# Patient Record
Sex: FEMALE | Race: WHITE | Employment: PART TIME | URBAN - METROPOLITAN AREA
[De-identification: names, ages, dates, MRNs, and addresses within clinical notes are randomized per-mention and may not be internally consistent; named-entity substitution may affect disease eponyms.]

---

## 2017-02-20 ENCOUNTER — APPOINTMENT (OUTPATIENT)
Dept: GENERAL RADIOLOGY | Age: 62
End: 2017-02-20
Payer: COMMERCIAL

## 2017-02-20 ENCOUNTER — HOSPITAL ENCOUNTER (EMERGENCY)
Age: 62
Discharge: HOME | End: 2017-02-20
Attending: EMERGENCY MEDICINE
Payer: COMMERCIAL

## 2017-02-20 VITALS
OXYGEN SATURATION: 99 % | DIASTOLIC BLOOD PRESSURE: 79 MMHG | HEART RATE: 59 BPM | TEMPERATURE: 97.8 F | BODY MASS INDEX: 29.03 KG/M2 | SYSTOLIC BLOOD PRESSURE: 136 MMHG | RESPIRATION RATE: 16 BRPM | WEIGHT: 185 LBS | HEIGHT: 67 IN

## 2017-02-20 DIAGNOSIS — M79.672 LEFT FOOT PAIN: Primary | ICD-10-CM

## 2017-02-20 PROCEDURE — 73630 X-RAY EXAM OF FOOT: CPT | Performed by: RADIOLOGY

## 2017-02-20 PROCEDURE — 73610 X-RAY EXAM OF ANKLE: CPT

## 2017-02-20 PROCEDURE — 73610 X-RAY EXAM OF ANKLE: CPT | Performed by: RADIOLOGY

## 2017-02-20 PROCEDURE — 99283 EMERGENCY DEPT VISIT LOW MDM: CPT

## 2017-02-20 PROCEDURE — 73630 X-RAY EXAM OF FOOT: CPT

## 2017-02-20 ASSESSMENT — PAIN DESCRIPTION - LOCATION: LOCATION: FOOT

## 2017-02-20 ASSESSMENT — PAIN DESCRIPTION - DESCRIPTORS: DESCRIPTORS: ACHING;SORE

## 2017-02-20 ASSESSMENT — PAIN DESCRIPTION - FREQUENCY: FREQUENCY: CONTINUOUS

## 2017-02-20 ASSESSMENT — PAIN SCALES - GENERAL: PAINLEVEL_OUTOF10: 5

## 2017-02-20 ASSESSMENT — PAIN DESCRIPTION - ORIENTATION: ORIENTATION: LEFT

## 2017-02-20 ASSESSMENT — PAIN DESCRIPTION - PAIN TYPE: TYPE: ACUTE PAIN

## 2017-06-30 ENCOUNTER — APPOINTMENT (OUTPATIENT)
Dept: GENERAL RADIOLOGY | Age: 62
DRG: 201 | End: 2017-06-30
Payer: COMMERCIAL

## 2017-06-30 ENCOUNTER — HOSPITAL ENCOUNTER (INPATIENT)
Age: 62
LOS: 2 days | Discharge: HOME | DRG: 201 | End: 2017-07-02
Attending: EMERGENCY MEDICINE | Admitting: FAMILY MEDICINE
Payer: COMMERCIAL

## 2017-06-30 DIAGNOSIS — J93.9 PNEUMOTHORAX, UNSPECIFIED TYPE: Primary | ICD-10-CM

## 2017-06-30 LAB
ABSOLUTE EOS #: 0.2 K/UL (ref 0–0.4)
ABSOLUTE LYMPH #: 2.2 K/UL (ref 1–4.8)
ABSOLUTE MONO #: 0.7 K/UL (ref 0.1–1.3)
ANION GAP SERPL CALCULATED.3IONS-SCNC: 17 MMOL/L (ref 9–17)
BASOPHILS # BLD: 1 %
BASOPHILS ABSOLUTE: 0.1 K/UL (ref 0–0.2)
BUN BLDV-MCNC: 16 MG/DL (ref 8–23)
BUN/CREAT BLD: NORMAL (ref 9–20)
CALCIUM SERPL-MCNC: 9.8 MG/DL (ref 8.6–10.4)
CHLORIDE BLD-SCNC: 98 MMOL/L (ref 98–107)
CO2: 24 MMOL/L (ref 20–31)
CREAT SERPL-MCNC: 0.75 MG/DL (ref 0.5–0.9)
DIFFERENTIAL TYPE: NORMAL
EOSINOPHILS RELATIVE PERCENT: 2 %
GFR AFRICAN AMERICAN: >60 ML/MIN
GFR NON-AFRICAN AMERICAN: >60 ML/MIN
GFR SERPL CREATININE-BSD FRML MDRD: NORMAL ML/MIN/{1.73_M2}
GFR SERPL CREATININE-BSD FRML MDRD: NORMAL ML/MIN/{1.73_M2}
GLUCOSE BLD-MCNC: 97 MG/DL (ref 70–99)
HCT VFR BLD CALC: 43.7 % (ref 36–46)
HEMOGLOBIN: 14.9 G/DL (ref 12–16)
LYMPHOCYTES # BLD: 27 %
MCH RBC QN AUTO: 31.9 PG (ref 26–34)
MCHC RBC AUTO-ENTMCNC: 34.1 G/DL (ref 31–37)
MCV RBC AUTO: 93.6 FL (ref 80–100)
MONOCYTES # BLD: 8 %
PDW BLD-RTO: 12.9 % (ref 11.5–14.9)
PLATELET # BLD: 264 K/UL (ref 150–450)
PLATELET ESTIMATE: NORMAL
PMV BLD AUTO: 9.1 FL (ref 6–12)
POTASSIUM SERPL-SCNC: 4.6 MMOL/L (ref 3.7–5.3)
RBC # BLD: 4.67 M/UL (ref 4–5.2)
RBC # BLD: NORMAL 10*6/UL
SEG NEUTROPHILS: 62 %
SEGMENTED NEUTROPHILS ABSOLUTE COUNT: 5.2 K/UL (ref 1.3–9.1)
SODIUM BLD-SCNC: 139 MMOL/L (ref 135–144)
TROPONIN INTERP: NORMAL
TROPONIN INTERP: NORMAL
TROPONIN T: <0.03 NG/ML
TROPONIN T: <0.03 NG/ML
WBC # BLD: 8.4 K/UL (ref 3.5–11)
WBC # BLD: NORMAL 10*3/UL

## 2017-06-30 PROCEDURE — 71020 XR CHEST STANDARD TWO VW: CPT

## 2017-06-30 PROCEDURE — 99285 EMERGENCY DEPT VISIT HI MDM: CPT

## 2017-06-30 PROCEDURE — 32551 INSERTION OF CHEST TUBE: CPT

## 2017-06-30 PROCEDURE — 0W9B30Z DRAINAGE OF LEFT PLEURAL CAVITY WITH DRAINAGE DEVICE, PERCUTANEOUS APPROACH: ICD-10-PCS | Performed by: EMERGENCY MEDICINE

## 2017-06-30 PROCEDURE — 6360000002 HC RX W HCPCS

## 2017-06-30 PROCEDURE — 85025 COMPLETE CBC W/AUTO DIFF WBC: CPT

## 2017-06-30 PROCEDURE — 2060000000 HC ICU INTERMEDIATE R&B

## 2017-06-30 PROCEDURE — 93005 ELECTROCARDIOGRAM TRACING: CPT

## 2017-06-30 PROCEDURE — 6360000002 HC RX W HCPCS: Performed by: EMERGENCY MEDICINE

## 2017-06-30 PROCEDURE — 36415 COLL VENOUS BLD VENIPUNCTURE: CPT

## 2017-06-30 PROCEDURE — 96375 TX/PRO/DX INJ NEW DRUG ADDON: CPT

## 2017-06-30 PROCEDURE — 71010 XR CHEST PORTABLE: CPT

## 2017-06-30 PROCEDURE — 2580000003 HC RX 258: Performed by: EMERGENCY MEDICINE

## 2017-06-30 PROCEDURE — 80048 BASIC METABOLIC PNL TOTAL CA: CPT

## 2017-06-30 PROCEDURE — 96374 THER/PROPH/DIAG INJ IV PUSH: CPT

## 2017-06-30 PROCEDURE — 84484 ASSAY OF TROPONIN QUANT: CPT

## 2017-06-30 RX ORDER — ACETAMINOPHEN 325 MG/1
650 TABLET ORAL EVERY 4 HOURS PRN
Status: DISCONTINUED | OUTPATIENT
Start: 2017-06-30 | End: 2017-07-02 | Stop reason: HOSPADM

## 2017-06-30 RX ORDER — LORAZEPAM 2 MG/ML
1 INJECTION INTRAMUSCULAR ONCE
Status: COMPLETED | OUTPATIENT
Start: 2017-06-30 | End: 2017-06-30

## 2017-06-30 RX ORDER — HYDROCHLOROTHIAZIDE 12.5 MG/1
12.5 CAPSULE, GELATIN COATED ORAL DAILY
Status: DISCONTINUED | OUTPATIENT
Start: 2017-07-01 | End: 2017-07-02 | Stop reason: HOSPADM

## 2017-06-30 RX ORDER — HYDROCODONE BITARTRATE AND ACETAMINOPHEN 5; 325 MG/1; MG/1
2 TABLET ORAL EVERY 6 HOURS PRN
Status: DISCONTINUED | OUTPATIENT
Start: 2017-06-30 | End: 2017-07-01

## 2017-06-30 RX ORDER — OMEPRAZOLE 20 MG/1
20 CAPSULE, DELAYED RELEASE ORAL DAILY
Status: DISCONTINUED | OUTPATIENT
Start: 2017-07-01 | End: 2017-07-02 | Stop reason: HOSPADM

## 2017-06-30 RX ORDER — MORPHINE SULFATE 4 MG/ML
4 INJECTION, SOLUTION INTRAMUSCULAR; INTRAVENOUS ONCE
Status: COMPLETED | OUTPATIENT
Start: 2017-06-30 | End: 2017-06-30

## 2017-06-30 RX ORDER — SODIUM CHLORIDE 0.9 % (FLUSH) 0.9 %
10 SYRINGE (ML) INJECTION EVERY 12 HOURS SCHEDULED
Status: DISCONTINUED | OUTPATIENT
Start: 2017-06-30 | End: 2017-07-02 | Stop reason: HOSPADM

## 2017-06-30 RX ORDER — FAMOTIDINE 20 MG/1
20 TABLET, FILM COATED ORAL NIGHTLY PRN
COMMUNITY
End: 2017-09-27 | Stop reason: ALTCHOICE

## 2017-06-30 RX ORDER — ATENOLOL 25 MG/1
25 TABLET ORAL 2 TIMES DAILY
Status: DISCONTINUED | OUTPATIENT
Start: 2017-06-30 | End: 2017-07-02 | Stop reason: HOSPADM

## 2017-06-30 RX ORDER — SODIUM CHLORIDE 0.9 % (FLUSH) 0.9 %
10 SYRINGE (ML) INJECTION PRN
Status: DISCONTINUED | OUTPATIENT
Start: 2017-06-30 | End: 2017-07-02 | Stop reason: HOSPADM

## 2017-06-30 RX ORDER — HYDROCODONE BITARTRATE AND ACETAMINOPHEN 5; 325 MG/1; MG/1
1 TABLET ORAL EVERY 6 HOURS PRN
Status: DISCONTINUED | OUTPATIENT
Start: 2017-06-30 | End: 2017-07-01

## 2017-06-30 RX ORDER — LISINOPRIL 20 MG/1
20 TABLET ORAL DAILY
Status: DISCONTINUED | OUTPATIENT
Start: 2017-07-01 | End: 2017-07-02 | Stop reason: HOSPADM

## 2017-06-30 RX ORDER — FAMOTIDINE 20 MG/1
20 TABLET, FILM COATED ORAL NIGHTLY PRN
Status: DISCONTINUED | OUTPATIENT
Start: 2017-07-01 | End: 2017-07-02 | Stop reason: HOSPADM

## 2017-06-30 RX ORDER — LORAZEPAM 0.5 MG/1
0.5 TABLET ORAL EVERY 12 HOURS PRN
Status: DISCONTINUED | OUTPATIENT
Start: 2017-06-30 | End: 2017-07-02 | Stop reason: HOSPADM

## 2017-06-30 RX ORDER — LEVALBUTEROL TARTRATE 45 UG/1
2 AEROSOL, METERED ORAL EVERY 4 HOURS PRN
Status: DISCONTINUED | OUTPATIENT
Start: 2017-06-30 | End: 2017-07-01 | Stop reason: RX

## 2017-06-30 RX ORDER — LISINOPRIL AND HYDROCHLOROTHIAZIDE 20; 12.5 MG/1; MG/1
1 TABLET ORAL DAILY
Status: DISCONTINUED | OUTPATIENT
Start: 2017-07-01 | End: 2017-06-30 | Stop reason: CLARIF

## 2017-06-30 RX ADMIN — CEFAZOLIN SODIUM 1 G: 1 INJECTION, POWDER, FOR SOLUTION INTRAMUSCULAR; INTRAVENOUS at 19:33

## 2017-06-30 RX ADMIN — HYDROMORPHONE HYDROCHLORIDE 1 MG: 1 INJECTION, SOLUTION INTRAMUSCULAR; INTRAVENOUS; SUBCUTANEOUS at 19:04

## 2017-06-30 RX ADMIN — MORPHINE SULFATE 4 MG: 4 INJECTION, SOLUTION INTRAMUSCULAR; INTRAVENOUS at 18:34

## 2017-06-30 RX ADMIN — LORAZEPAM 1 MG: 2 INJECTION INTRAMUSCULAR; INTRAVENOUS at 18:34

## 2017-06-30 RX ADMIN — Medication 1 MG: at 19:04

## 2017-06-30 RX ADMIN — MORPHINE SULFATE 4 MG: 4 INJECTION, SOLUTION INTRAMUSCULAR; INTRAVENOUS at 21:06

## 2017-06-30 ASSESSMENT — ENCOUNTER SYMPTOMS
BACK PAIN: 1
SHORTNESS OF BREATH: 1
WHEEZING: 0
STRIDOR: 0
COUGH: 0
VOMITING: 0
ABDOMINAL PAIN: 0
NAUSEA: 0

## 2017-06-30 ASSESSMENT — PAIN DESCRIPTION - FREQUENCY: FREQUENCY: CONTINUOUS

## 2017-06-30 ASSESSMENT — PAIN SCALES - GENERAL
PAINLEVEL_OUTOF10: 4
PAINLEVEL_OUTOF10: 10
PAINLEVEL_OUTOF10: 6

## 2017-06-30 ASSESSMENT — PAIN DESCRIPTION - LOCATION
LOCATION: CHEST
LOCATION: CHEST

## 2017-06-30 ASSESSMENT — PAIN DESCRIPTION - ORIENTATION: ORIENTATION: LEFT

## 2017-06-30 ASSESSMENT — PAIN SCALES - WONG BAKER: WONGBAKER_NUMERICALRESPONSE: 6

## 2017-06-30 ASSESSMENT — PAIN DESCRIPTION - DESCRIPTORS: DESCRIPTORS: ACHING

## 2017-06-30 ASSESSMENT — PAIN DESCRIPTION - PAIN TYPE: TYPE: ACUTE PAIN

## 2017-07-01 ENCOUNTER — APPOINTMENT (OUTPATIENT)
Dept: GENERAL RADIOLOGY | Age: 62
DRG: 201 | End: 2017-07-01
Payer: COMMERCIAL

## 2017-07-01 LAB
ABSOLUTE EOS #: 0.2 K/UL (ref 0–0.4)
ABSOLUTE LYMPH #: 2.8 K/UL (ref 1–4.8)
ABSOLUTE MONO #: 0.9 K/UL (ref 0.1–1.3)
ANION GAP SERPL CALCULATED.3IONS-SCNC: 15 MMOL/L (ref 9–17)
BASOPHILS # BLD: 1 %
BASOPHILS ABSOLUTE: 0.1 K/UL (ref 0–0.2)
BUN BLDV-MCNC: 15 MG/DL (ref 8–23)
BUN/CREAT BLD: ABNORMAL (ref 9–20)
CALCIUM SERPL-MCNC: 9.4 MG/DL (ref 8.6–10.4)
CHLORIDE BLD-SCNC: 97 MMOL/L (ref 98–107)
CO2: 26 MMOL/L (ref 20–31)
CREAT SERPL-MCNC: 0.75 MG/DL (ref 0.5–0.9)
DIFFERENTIAL TYPE: NORMAL
EOSINOPHILS RELATIVE PERCENT: 2 %
GFR AFRICAN AMERICAN: >60 ML/MIN
GFR NON-AFRICAN AMERICAN: >60 ML/MIN
GFR SERPL CREATININE-BSD FRML MDRD: ABNORMAL ML/MIN/{1.73_M2}
GFR SERPL CREATININE-BSD FRML MDRD: ABNORMAL ML/MIN/{1.73_M2}
GLUCOSE BLD-MCNC: 113 MG/DL (ref 70–99)
HCT VFR BLD CALC: 43.3 % (ref 36–46)
HEMOGLOBIN: 14.8 G/DL (ref 12–16)
LYMPHOCYTES # BLD: 28 %
MCH RBC QN AUTO: 32.2 PG (ref 26–34)
MCHC RBC AUTO-ENTMCNC: 34.2 G/DL (ref 31–37)
MCV RBC AUTO: 94.1 FL (ref 80–100)
MONOCYTES # BLD: 9 %
PDW BLD-RTO: 13 % (ref 11.5–14.9)
PLATELET # BLD: 229 K/UL (ref 150–450)
PLATELET ESTIMATE: NORMAL
PMV BLD AUTO: 9.1 FL (ref 6–12)
POTASSIUM SERPL-SCNC: 4 MMOL/L (ref 3.7–5.3)
RBC # BLD: 4.6 M/UL (ref 4–5.2)
RBC # BLD: NORMAL 10*6/UL
SEG NEUTROPHILS: 60 %
SEGMENTED NEUTROPHILS ABSOLUTE COUNT: 6.2 K/UL (ref 1.3–9.1)
SODIUM BLD-SCNC: 138 MMOL/L (ref 135–144)
WBC # BLD: 10.2 K/UL (ref 3.5–11)
WBC # BLD: NORMAL 10*3/UL

## 2017-07-01 PROCEDURE — 6370000000 HC RX 637 (ALT 250 FOR IP): Performed by: SURGERY

## 2017-07-01 PROCEDURE — 6370000000 HC RX 637 (ALT 250 FOR IP): Performed by: FAMILY MEDICINE

## 2017-07-01 PROCEDURE — 71010 XR CHEST PORTABLE: CPT

## 2017-07-01 PROCEDURE — 2580000003 HC RX 258: Performed by: FAMILY MEDICINE

## 2017-07-01 PROCEDURE — 6360000002 HC RX W HCPCS: Performed by: FAMILY MEDICINE

## 2017-07-01 PROCEDURE — 36415 COLL VENOUS BLD VENIPUNCTURE: CPT

## 2017-07-01 PROCEDURE — 80048 BASIC METABOLIC PNL TOTAL CA: CPT

## 2017-07-01 PROCEDURE — 85025 COMPLETE CBC W/AUTO DIFF WBC: CPT

## 2017-07-01 PROCEDURE — 2060000000 HC ICU INTERMEDIATE R&B

## 2017-07-01 RX ORDER — HYDROCODONE BITARTRATE AND ACETAMINOPHEN 5; 325 MG/1; MG/1
1 TABLET ORAL EVERY 4 HOURS PRN
Status: DISCONTINUED | OUTPATIENT
Start: 2017-07-01 | End: 2017-07-02 | Stop reason: HOSPADM

## 2017-07-01 RX ADMIN — HYDROCHLOROTHIAZIDE 12.5 MG: 12.5 CAPSULE ORAL at 08:42

## 2017-07-01 RX ADMIN — CEFAZOLIN 1 G: 1 INJECTION, POWDER, FOR SOLUTION INTRAMUSCULAR; INTRAVENOUS at 19:49

## 2017-07-01 RX ADMIN — HYDROCODONE BITARTRATE AND ACETAMINOPHEN 1 TABLET: 5; 325 TABLET ORAL at 08:41

## 2017-07-01 RX ADMIN — HYDROCODONE BITARTRATE AND ACETAMINOPHEN 1 TABLET: 5; 325 TABLET ORAL at 00:45

## 2017-07-01 RX ADMIN — OMEPRAZOLE 20 MG: 20 CAPSULE, DELAYED RELEASE ORAL at 08:40

## 2017-07-01 RX ADMIN — HYDROCODONE BITARTRATE AND ACETAMINOPHEN 1 TABLET: 5; 325 TABLET ORAL at 14:13

## 2017-07-01 RX ADMIN — LISINOPRIL 20 MG: 20 TABLET ORAL at 08:41

## 2017-07-01 RX ADMIN — Medication 10 ML: at 20:23

## 2017-07-01 RX ADMIN — ATENOLOL 25 MG: 25 TABLET ORAL at 08:40

## 2017-07-01 RX ADMIN — HYDROCODONE BITARTRATE AND ACETAMINOPHEN 1 TABLET: 5; 325 TABLET ORAL at 19:56

## 2017-07-01 RX ADMIN — CEFAZOLIN 1 G: 1 INJECTION, POWDER, FOR SOLUTION INTRAMUSCULAR; INTRAVENOUS at 08:43

## 2017-07-01 RX ADMIN — HYDROCODONE BITARTRATE AND ACETAMINOPHEN 1 TABLET: 5; 325 TABLET ORAL at 04:19

## 2017-07-01 ASSESSMENT — PAIN SCALES - GENERAL
PAINLEVEL_OUTOF10: 2
PAINLEVEL_OUTOF10: 5
PAINLEVEL_OUTOF10: 8
PAINLEVEL_OUTOF10: 9
PAINLEVEL_OUTOF10: 7
PAINLEVEL_OUTOF10: 0
PAINLEVEL_OUTOF10: 6
PAINLEVEL_OUTOF10: 3
PAINLEVEL_OUTOF10: 0
PAINLEVEL_OUTOF10: 1

## 2017-07-01 ASSESSMENT — PAIN DESCRIPTION - ORIENTATION
ORIENTATION: LEFT
ORIENTATION: LEFT
ORIENTATION: LEFT;OUTER
ORIENTATION: LEFT

## 2017-07-01 ASSESSMENT — PAIN DESCRIPTION - FREQUENCY: FREQUENCY: CONTINUOUS

## 2017-07-01 ASSESSMENT — PAIN DESCRIPTION - PAIN TYPE
TYPE: SURGICAL PAIN
TYPE: ACUTE PAIN

## 2017-07-01 ASSESSMENT — PAIN DESCRIPTION - LOCATION
LOCATION: CHEST

## 2017-07-01 ASSESSMENT — PAIN DESCRIPTION - DESCRIPTORS: DESCRIPTORS: ACHING;DISCOMFORT

## 2017-07-02 ENCOUNTER — APPOINTMENT (OUTPATIENT)
Dept: GENERAL RADIOLOGY | Age: 62
DRG: 201 | End: 2017-07-02
Payer: COMMERCIAL

## 2017-07-02 VITALS
TEMPERATURE: 98.2 F | HEIGHT: 67 IN | DIASTOLIC BLOOD PRESSURE: 65 MMHG | WEIGHT: 193.78 LBS | OXYGEN SATURATION: 94 % | RESPIRATION RATE: 17 BRPM | HEART RATE: 64 BPM | BODY MASS INDEX: 30.42 KG/M2 | SYSTOLIC BLOOD PRESSURE: 94 MMHG

## 2017-07-02 PROCEDURE — 6370000000 HC RX 637 (ALT 250 FOR IP): Performed by: FAMILY MEDICINE

## 2017-07-02 PROCEDURE — 2580000003 HC RX 258: Performed by: FAMILY MEDICINE

## 2017-07-02 PROCEDURE — 71010 XR CHEST PORTABLE: CPT

## 2017-07-02 PROCEDURE — 6370000000 HC RX 637 (ALT 250 FOR IP): Performed by: SURGERY

## 2017-07-02 PROCEDURE — 71010 XR CHEST LIMITED: CPT

## 2017-07-02 RX ORDER — HYDROCODONE BITARTRATE AND ACETAMINOPHEN 5; 325 MG/1; MG/1
1 TABLET ORAL EVERY 8 HOURS PRN
Qty: 60 TABLET | Refills: 0 | Status: SHIPPED | OUTPATIENT
Start: 2017-07-02 | End: 2017-08-01

## 2017-07-02 RX ADMIN — HYDROCODONE BITARTRATE AND ACETAMINOPHEN 1 TABLET: 5; 325 TABLET ORAL at 05:05

## 2017-07-02 RX ADMIN — HYDROCHLOROTHIAZIDE 12.5 MG: 12.5 CAPSULE ORAL at 08:24

## 2017-07-02 RX ADMIN — Medication 10 ML: at 08:24

## 2017-07-02 RX ADMIN — HYDROCODONE BITARTRATE AND ACETAMINOPHEN 1 TABLET: 5; 325 TABLET ORAL at 00:30

## 2017-07-02 RX ADMIN — OMEPRAZOLE 20 MG: 20 CAPSULE, DELAYED RELEASE ORAL at 08:23

## 2017-07-02 RX ADMIN — HYDROCODONE BITARTRATE AND ACETAMINOPHEN 1 TABLET: 5; 325 TABLET ORAL at 09:08

## 2017-07-02 RX ADMIN — HYDROCODONE BITARTRATE AND ACETAMINOPHEN 1 TABLET: 5; 325 TABLET ORAL at 13:08

## 2017-07-02 RX ADMIN — ATENOLOL 25 MG: 25 TABLET ORAL at 08:23

## 2017-07-02 ASSESSMENT — PAIN DESCRIPTION - PAIN TYPE
TYPE: SURGICAL PAIN

## 2017-07-02 ASSESSMENT — PAIN DESCRIPTION - ORIENTATION
ORIENTATION: LEFT
ORIENTATION: LEFT
ORIENTATION: LEFT;OUTER
ORIENTATION: LEFT

## 2017-07-02 ASSESSMENT — PAIN DESCRIPTION - LOCATION
LOCATION: CHEST

## 2017-07-02 ASSESSMENT — PAIN SCALES - GENERAL
PAINLEVEL_OUTOF10: 2
PAINLEVEL_OUTOF10: 4
PAINLEVEL_OUTOF10: 6
PAINLEVEL_OUTOF10: 1
PAINLEVEL_OUTOF10: 5
PAINLEVEL_OUTOF10: 5
PAINLEVEL_OUTOF10: 6
PAINLEVEL_OUTOF10: 7
PAINLEVEL_OUTOF10: 3
PAINLEVEL_OUTOF10: 6

## 2017-07-02 ASSESSMENT — PAIN DESCRIPTION - FREQUENCY
FREQUENCY: CONTINUOUS
FREQUENCY: CONTINUOUS

## 2017-07-02 ASSESSMENT — PAIN DESCRIPTION - DESCRIPTORS
DESCRIPTORS: ACHING;DISCOMFORT
DESCRIPTORS: ACHING;DISCOMFORT

## 2017-07-05 LAB
EKG ATRIAL RATE: 94 BPM
EKG P AXIS: 71 DEGREES
EKG P-R INTERVAL: 144 MS
EKG Q-T INTERVAL: 368 MS
EKG QRS DURATION: 80 MS
EKG QTC CALCULATION (BAZETT): 460 MS
EKG R AXIS: 66 DEGREES
EKG T AXIS: 85 DEGREES
EKG VENTRICULAR RATE: 94 BPM

## 2017-07-28 ENCOUNTER — HOSPITAL ENCOUNTER (OUTPATIENT)
Dept: GENERAL RADIOLOGY | Age: 62
Discharge: HOME | End: 2017-07-28
Payer: COMMERCIAL

## 2017-07-28 ENCOUNTER — HOSPITAL ENCOUNTER (OUTPATIENT)
Age: 62
Discharge: HOME | End: 2017-07-28
Payer: COMMERCIAL

## 2017-07-28 DIAGNOSIS — J93.9 PNEUMOTHORAX, UNSPECIFIED TYPE: ICD-10-CM

## 2017-07-28 PROCEDURE — 71020 XR CHEST STANDARD TWO VW: CPT

## 2017-09-27 ENCOUNTER — OFFICE VISIT (OUTPATIENT)
Dept: GASTROENTEROLOGY | Age: 62
End: 2017-09-27
Payer: COMMERCIAL

## 2017-09-27 VITALS
SYSTOLIC BLOOD PRESSURE: 129 MMHG | TEMPERATURE: 96.9 F | OXYGEN SATURATION: 95 % | HEIGHT: 67 IN | HEART RATE: 69 BPM | DIASTOLIC BLOOD PRESSURE: 75 MMHG | BODY MASS INDEX: 31.45 KG/M2 | WEIGHT: 200.4 LBS

## 2017-09-27 DIAGNOSIS — K62.5 RECTAL BLEEDING: Primary | ICD-10-CM

## 2017-09-27 DIAGNOSIS — R19.7 DIARRHEA, UNSPECIFIED TYPE: ICD-10-CM

## 2017-09-27 DIAGNOSIS — Z86.010 HISTORY OF COLON POLYPS: ICD-10-CM

## 2017-09-27 PROCEDURE — 99214 OFFICE O/P EST MOD 30 MIN: CPT | Performed by: INTERNAL MEDICINE

## 2017-09-27 RX ORDER — IBUPROFEN 200 MG
200 TABLET ORAL EVERY 6 HOURS PRN
COMMUNITY
End: 2020-04-29

## 2017-09-27 ASSESSMENT — ENCOUNTER SYMPTOMS
RHINORRHEA: 0
ANAL BLEEDING: 0
VOICE CHANGE: 0
SINUS PRESSURE: 1
NAUSEA: 0
BACK PAIN: 1
BLOOD IN STOOL: 1
FACIAL SWELLING: 0
RECTAL PAIN: 0
WHEEZING: 0
SORE THROAT: 0
SHORTNESS OF BREATH: 1
ABDOMINAL DISTENTION: 0
VOMITING: 0
CONSTIPATION: 0
COUGH: 0
ABDOMINAL PAIN: 0
DIARRHEA: 1
TROUBLE SWALLOWING: 0

## 2017-11-18 ENCOUNTER — HOSPITAL ENCOUNTER (OUTPATIENT)
Age: 62
Setting detail: OUTPATIENT SURGERY
Discharge: HOME | End: 2017-11-18
Attending: INTERNAL MEDICINE | Admitting: INTERNAL MEDICINE
Payer: COMMERCIAL

## 2017-11-18 VITALS
BODY MASS INDEX: 29.82 KG/M2 | DIASTOLIC BLOOD PRESSURE: 73 MMHG | HEIGHT: 67 IN | HEART RATE: 60 BPM | RESPIRATION RATE: 18 BRPM | TEMPERATURE: 97.7 F | OXYGEN SATURATION: 94 % | WEIGHT: 190 LBS | SYSTOLIC BLOOD PRESSURE: 117 MMHG

## 2017-11-18 PROCEDURE — 6360000002 HC RX W HCPCS: Performed by: INTERNAL MEDICINE

## 2017-11-18 PROCEDURE — 99152 MOD SED SAME PHYS/QHP 5/>YRS: CPT | Performed by: INTERNAL MEDICINE

## 2017-11-18 PROCEDURE — C1773 RET DEV, INSERTABLE: HCPCS | Performed by: INTERNAL MEDICINE

## 2017-11-18 PROCEDURE — 88305 TISSUE EXAM BY PATHOLOGIST: CPT

## 2017-11-18 PROCEDURE — 7100000010 HC PHASE II RECOVERY - FIRST 15 MIN: Performed by: INTERNAL MEDICINE

## 2017-11-18 PROCEDURE — 2580000003 HC RX 258: Performed by: INTERNAL MEDICINE

## 2017-11-18 PROCEDURE — 3609010400 HC COLONOSCOPY POLYPECTOMY HOT BIOPSY: Performed by: INTERNAL MEDICINE

## 2017-11-18 PROCEDURE — 7100000011 HC PHASE II RECOVERY - ADDTL 15 MIN: Performed by: INTERNAL MEDICINE

## 2017-11-18 PROCEDURE — 99153 MOD SED SAME PHYS/QHP EA: CPT | Performed by: INTERNAL MEDICINE

## 2017-11-18 RX ORDER — MIDAZOLAM HYDROCHLORIDE 1 MG/ML
INJECTION INTRAMUSCULAR; INTRAVENOUS PRN
Status: DISCONTINUED | OUTPATIENT
Start: 2017-11-18 | End: 2017-11-18 | Stop reason: HOSPADM

## 2017-11-18 RX ORDER — FENTANYL CITRATE 50 UG/ML
INJECTION, SOLUTION INTRAMUSCULAR; INTRAVENOUS PRN
Status: DISCONTINUED | OUTPATIENT
Start: 2017-11-18 | End: 2017-11-18 | Stop reason: HOSPADM

## 2017-11-18 RX ORDER — SODIUM CHLORIDE 9 MG/ML
INJECTION, SOLUTION INTRAVENOUS CONTINUOUS
Status: DISCONTINUED | OUTPATIENT
Start: 2017-11-18 | End: 2017-11-18 | Stop reason: HOSPADM

## 2017-11-18 RX ADMIN — SODIUM CHLORIDE: 9 INJECTION, SOLUTION INTRAVENOUS at 07:05

## 2017-11-18 ASSESSMENT — PAIN DESCRIPTION - DESCRIPTORS: DESCRIPTORS: CRAMPING

## 2017-11-18 ASSESSMENT — PAIN - FUNCTIONAL ASSESSMENT: PAIN_FUNCTIONAL_ASSESSMENT: 0-10

## 2017-11-18 ASSESSMENT — PAIN DESCRIPTION - PAIN TYPE: TYPE: ACUTE PAIN

## 2017-11-18 ASSESSMENT — PAIN SCALES - GENERAL: PAINLEVEL_OUTOF10: 2

## 2017-11-18 ASSESSMENT — PAIN DESCRIPTION - LOCATION: LOCATION: ABDOMEN

## 2017-11-18 NOTE — H&P
HISTORY and [address]       NAME:  Beatrice Lopez  MRN: [id number]   YOB: 1955   Date: 11/18/2017   Age: 59 y.o. Gender: female       COMPLAINT AND PRESENT HISTORY:   59 y o female with last colonoscopy about one year ago. Patient had \"flat polyp\" removed. Has IBS with lose BM 5-6 times/day. Tends to have rectal bleeding. Has had hemorrhoids in the past.  No bleeding following MD visit with rectal exam.    Bleeding is bright red and turns commode water red. Patient experiences nausea and cramping. PAST MEDICAL HISTORY     Past Medical History:   Diagnosis Date    Arthritis     COPD [facility]chronic obstructive pulmonary disease) ([facility] [address]     GERD (gastroesophageal reflux disease)     Heart palpitations     History of colon polyps 2002    Hyperlipidemia     Hyperplastic colon polyp 2016    Hypertension     Pneumothorax     spontaneous    Supraventricular tachycardia (HCC)        Pt denies any history of Diabetes mellitus type 2, hypertension, stroke, heart disease,  Asthma, Cancer, Seizures,Thyroid disease, Kidney Disease, Hepatitis, TB, Psychiatric Disorders or Substance abuse.     SURGICAL HISTORY       Past Surgical History:   Procedure Laterality Date    CHOLECYSTECTOMY, LAPAROSCOPIC  2010    COLONOSCOPY  9/2009    small hemorrhoids    COLONOSCOPY  7/2002    hemohhoids, hyperplastic polyp, right colon pathology-chronic inflammation in lumina propria with focal actue cryptitis consistent with active colitis    COLONOSCOPY  02/17/2016    flat sigmoid [id number]    DILATION AND CURETTAGE      AUB    GYNECOLOGIC CRYOSURGERY  30's    SIGMOIDOSCOPY  2003    hemorrhoids, no colitis seen    TONSILLECTOMY AND ADENOIDECTOMY      child        FAMILY HISTORY       Family History   Problem Relation Age of Onset    Cancer Father 28     bone sarcoma    Hypertension Mother    [facility] Other Mother      peripheral neuropathy    Uterine Cancer Maternal (PRINZIDE;ZESTORETIC) 20-12.5 MG per tablet Take 1 tablet by mouth daily 90 tablet 1    atenolol (TENORMIN) 25 MG tablet Take 1 tablet by mouth 2 times daily 180 tablet 1    LORazepam (ATIVAN) 0.5 MG tablet Take 1 tablet by mouth 2 times daily (Patient taking differently: Take 0.5 mg by mouth every 12 hours as needed for Anxiety ) 60 tablet 0    omeprazole (PRILOSEC) 20 MG capsule Take 20 mg by mouth daily.  ibuprofen (ADVIL) 200 MG tablet Take 200 mg by mouth every 6 hours as needed for Pain      aspirin 81 MG tablet Take 81 mg by mouth daily as needed           General health:  Fairly good. No fever or chills. Skin:  No itching, redness or rash. HEENT:  No headache, epistaxis or sore throat. Neck:  No pain, stiffness or masses. Cardiovascular/Respiratory system:  No chest pain, palpitation or shortness of breath. Hx of spontaneous pneumothorax. Last one month ago. Gastrointestinal tract: No abdominal pain, nausea, vomiting, diarrhea or constipation. IBS. Genitourinary:  No burning on micturition. No hesitancy, urgency, frequency or discoloration of urine. Locomotor:  No bone or joint pains. No swelling. Arthritis, back issues. Neuropsychiatric:  No referable complaints. See HPI. GENERAL PHYSICAL EXAM:     Vitals: /77   Pulse 55   Temp 97.3 °F (36.3 °C) (Oral)   Resp 18   Ht 5' 7\" (1.702 m)   Wt 190 lb (86.2 kg)   LMP 06/26/1997   SpO2 98%   BMI 29.76 kg/m²  Body mass index is 29.76 kg/m². GENERAL APPEARANCE:   Joe Solano is 59 y.o.,  female, not obese, nourished, conscious, alert. Does not appear to be distress or pain at this time. SKIN:  Warm, dry, no cyanosis or jaundice. HEAD:  Normocephalic, atraumatic, no swelling or tenderness.                  EYES:  Pupils equal, reactive to light.           EARS:  No discharge, no marked hearing loss. NOSE:  No rhinorrhea, epistaxis or septal deformity. THROAT:  Not congested. No ulceration bleeding or discharge. NECK:  No stiffness, trachea central.  No palpable masses or L.N.                 CHEST:  Symmetrical and equal on expansion. HEART:  RRR S1 > S2. No audible murmurs or gallops. LUNGS:  Equal on expansion, normal breath sounds. No adventitious sounds. ABDOMEN:    Soft on palpation. No localized tenderness. No guarding or rigidity. No palpable organomegaly. LYMPHATICS:  No palpable cervical lymphadenopathy. LOCOMOTOR, BACK AND SPINE:  No tenderness or deformities. EXTREMITIES:  Symmetrical, no pedal edema. Katinas sign negative. No discoloration or ulcerations. NEUROLOGIC:  The patient is conscious, alert, oriented, No apparent focal sensory or motor deficits.                                                                                      PROVISIONAL DIAGNOSES / SURGERY:      Colonoscopy    Patient Active Problem List    Diagnosis Date Noted    Diarrhea 09/27/2017    Hyperplastic colon polyp     Change in bowel function 02/10/2016    Rectal bleeding 02/10/2016    History of colon polyps     COPD (chronic obstructive pulmonary disease) ([facility] [address]     Hyperlipidemia     Heart palpitations            YVETTE BARKER NP on 11/18/2017 at 7:31 AM

## 2017-11-18 NOTE — OP NOTE
SNARE AND CAUTERY TECHNIQUE. aS THERE WAS SOME RESIDUAL TISSUE AT THE MARGINS OF THIS POLYPECTOMY SITE, I DID DESTROYED THIS RESIDUAL POLYP APC. Rectum/Anus: examined in normal and retroflexed positions and was normal    Withdrawal Time was (minutes): 20          The colon was decompressed and the scope was removed. The patient tolerated the procedure well without complications. Recommendations/Plan:   1. F/U Biopsies  2. F/U In Office as instructed  3. Discussed with the family  4. High fiber diet   5. Precautions to avoid constipation     Next colonoscopy: 1 years.   If Colonoscopy is less than 10 years the recommended reason is due:polyps    Electronically signed by Chad Torrez MD  on 11/18/2017 at 12:46 PM

## 2017-11-21 LAB — SURGICAL PATHOLOGY REPORT: NORMAL

## 2017-11-24 ENCOUNTER — HOSPITAL ENCOUNTER (OUTPATIENT)
Dept: MRI IMAGING | Age: 62
Discharge: HOME | End: 2017-11-24
Payer: COMMERCIAL

## 2017-11-24 DIAGNOSIS — G89.4 CHRONIC PAIN SYNDROME: ICD-10-CM

## 2017-11-24 PROCEDURE — 72141 MRI NECK SPINE W/O DYE: CPT

## 2017-12-19 PROBLEM — K63.5 COLON POLYPS: Status: ACTIVE | Noted: 2017-11-18

## 2018-08-31 ENCOUNTER — HOSPITAL ENCOUNTER (EMERGENCY)
Age: 63
Discharge: HOME | End: 2018-08-31
Attending: EMERGENCY MEDICINE
Payer: COMMERCIAL

## 2018-08-31 ENCOUNTER — APPOINTMENT (OUTPATIENT)
Dept: GENERAL RADIOLOGY | Age: 63
End: 2018-08-31
Payer: COMMERCIAL

## 2018-08-31 ENCOUNTER — APPOINTMENT (OUTPATIENT)
Dept: CT IMAGING | Age: 63
End: 2018-08-31
Payer: COMMERCIAL

## 2018-08-31 VITALS
HEIGHT: 67 IN | BODY MASS INDEX: 30.61 KG/M2 | SYSTOLIC BLOOD PRESSURE: 124 MMHG | OXYGEN SATURATION: 96 % | RESPIRATION RATE: 14 BRPM | WEIGHT: 195 LBS | TEMPERATURE: 97.9 F | DIASTOLIC BLOOD PRESSURE: 82 MMHG | HEART RATE: 58 BPM

## 2018-08-31 DIAGNOSIS — R07.9 CHEST PAIN, UNSPECIFIED TYPE: Primary | ICD-10-CM

## 2018-08-31 LAB
ABSOLUTE EOS #: 0.1 K/UL (ref 0–0.4)
ABSOLUTE IMMATURE GRANULOCYTE: ABNORMAL K/UL (ref 0–0.3)
ABSOLUTE LYMPH #: 1.5 K/UL (ref 1–4.8)
ABSOLUTE MONO #: 0.5 K/UL (ref 0.1–1.3)
ALBUMIN SERPL-MCNC: 4 G/DL (ref 3.5–5.2)
ALBUMIN/GLOBULIN RATIO: ABNORMAL (ref 1–2.5)
ALP BLD-CCNC: 69 U/L (ref 35–104)
ALT SERPL-CCNC: 18 U/L (ref 5–33)
ANION GAP SERPL CALCULATED.3IONS-SCNC: 12 MMOL/L (ref 9–17)
AST SERPL-CCNC: 16 U/L
BASOPHILS # BLD: 1 % (ref 0–2)
BASOPHILS ABSOLUTE: 0.1 K/UL (ref 0–0.2)
BILIRUB SERPL-MCNC: 0.29 MG/DL (ref 0.3–1.2)
BUN BLDV-MCNC: 16 MG/DL (ref 8–23)
BUN/CREAT BLD: ABNORMAL (ref 9–20)
CALCIUM SERPL-MCNC: 9 MG/DL (ref 8.6–10.4)
CHLORIDE BLD-SCNC: 103 MMOL/L (ref 98–107)
CO2: 24 MMOL/L (ref 20–31)
CREAT SERPL-MCNC: 0.74 MG/DL (ref 0.5–0.9)
DIFFERENTIAL TYPE: ABNORMAL
EKG ATRIAL RATE: 61 BPM
EKG P AXIS: 81 DEGREES
EKG P-R INTERVAL: 138 MS
EKG Q-T INTERVAL: 396 MS
EKG QRS DURATION: 84 MS
EKG QTC CALCULATION (BAZETT): 398 MS
EKG T AXIS: 49 DEGREES
EKG VENTRICULAR RATE: 61 BPM
EOSINOPHILS RELATIVE PERCENT: 3 % (ref 0–4)
GFR AFRICAN AMERICAN: >60 ML/MIN
GFR NON-AFRICAN AMERICAN: >60 ML/MIN
GFR SERPL CREATININE-BSD FRML MDRD: ABNORMAL ML/MIN/{1.73_M2}
GFR SERPL CREATININE-BSD FRML MDRD: ABNORMAL ML/MIN/{1.73_M2}
GLUCOSE BLD-MCNC: 108 MG/DL (ref 70–99)
HCT VFR BLD CALC: 41.8 % (ref 36–46)
HEMOGLOBIN: 13.7 G/DL (ref 12–16)
IMMATURE GRANULOCYTES: ABNORMAL %
INR BLD: 1
LYMPHOCYTES # BLD: 25 % (ref 24–44)
MCH RBC QN AUTO: 31 PG (ref 26–34)
MCHC RBC AUTO-ENTMCNC: 32.7 G/DL (ref 31–37)
MCV RBC AUTO: 94.8 FL (ref 80–100)
MONOCYTES # BLD: 8 % (ref 1–7)
NRBC AUTOMATED: ABNORMAL PER 100 WBC
PARTIAL THROMBOPLASTIN TIME: 25.7 SEC (ref 23–31)
PDW BLD-RTO: 13.2 % (ref 11.5–14.9)
PLATELET # BLD: 227 K/UL (ref 150–450)
PLATELET ESTIMATE: ABNORMAL
PMV BLD AUTO: 8.9 FL (ref 6–12)
POTASSIUM SERPL-SCNC: 4.6 MMOL/L (ref 3.7–5.3)
PROTHROMBIN TIME: 10 SEC (ref 9.7–12)
RBC # BLD: 4.4 M/UL (ref 4–5.2)
RBC # BLD: ABNORMAL 10*6/UL
SEG NEUTROPHILS: 63 % (ref 36–66)
SEGMENTED NEUTROPHILS ABSOLUTE COUNT: 3.8 K/UL (ref 1.3–9.1)
SODIUM BLD-SCNC: 139 MMOL/L (ref 135–144)
TOTAL PROTEIN: 6.9 G/DL (ref 6.4–8.3)
TROPONIN INTERP: NORMAL
TROPONIN INTERP: NORMAL
TROPONIN T: <0.03 NG/ML
TROPONIN T: <0.03 NG/ML
WBC # BLD: 6 K/UL (ref 3.5–11)
WBC # BLD: ABNORMAL 10*3/UL

## 2018-08-31 PROCEDURE — 99285 EMERGENCY DEPT VISIT HI MDM: CPT

## 2018-08-31 PROCEDURE — 6360000004 HC RX CONTRAST MEDICATION: Performed by: EMERGENCY MEDICINE

## 2018-08-31 PROCEDURE — 85025 COMPLETE CBC W/AUTO DIFF WBC: CPT

## 2018-08-31 PROCEDURE — 2580000003 HC RX 258: Performed by: EMERGENCY MEDICINE

## 2018-08-31 PROCEDURE — 84484 ASSAY OF TROPONIN QUANT: CPT

## 2018-08-31 PROCEDURE — 36415 COLL VENOUS BLD VENIPUNCTURE: CPT

## 2018-08-31 PROCEDURE — 93005 ELECTROCARDIOGRAM TRACING: CPT

## 2018-08-31 PROCEDURE — 85730 THROMBOPLASTIN TIME PARTIAL: CPT

## 2018-08-31 PROCEDURE — 85610 PROTHROMBIN TIME: CPT

## 2018-08-31 PROCEDURE — 80053 COMPREHEN METABOLIC PANEL: CPT

## 2018-08-31 PROCEDURE — 71260 CT THORAX DX C+: CPT

## 2018-08-31 PROCEDURE — 71046 X-RAY EXAM CHEST 2 VIEWS: CPT

## 2018-08-31 RX ORDER — FAMOTIDINE 20 MG/1
20 TABLET, FILM COATED ORAL NIGHTLY PRN
COMMUNITY
End: 2021-07-02

## 2018-08-31 RX ORDER — SODIUM CHLORIDE 0.9 % (FLUSH) 0.9 %
10 SYRINGE (ML) INJECTION PRN
Status: DISCONTINUED | OUTPATIENT
Start: 2018-08-31 | End: 2018-08-31 | Stop reason: HOSPADM

## 2018-08-31 RX ORDER — 0.9 % SODIUM CHLORIDE 0.9 %
80 INTRAVENOUS SOLUTION INTRAVENOUS ONCE
Status: COMPLETED | OUTPATIENT
Start: 2018-08-31 | End: 2018-08-31

## 2018-08-31 RX ADMIN — SODIUM CHLORIDE 80 ML: 9 INJECTION, SOLUTION INTRAVENOUS at 13:10

## 2018-08-31 RX ADMIN — IOPAMIDOL 75 ML: 755 INJECTION, SOLUTION INTRAVENOUS at 13:11

## 2018-08-31 RX ADMIN — Medication 10 ML: at 13:10

## 2018-08-31 ASSESSMENT — PAIN SCALES - GENERAL: PAINLEVEL_OUTOF10: 4

## 2018-08-31 ASSESSMENT — PAIN DESCRIPTION - PAIN TYPE: TYPE: ACUTE PAIN

## 2018-08-31 ASSESSMENT — ENCOUNTER SYMPTOMS
SHORTNESS OF BREATH: 0
COUGH: 0
BACK PAIN: 0
GASTROINTESTINAL NEGATIVE: 1
EYES NEGATIVE: 1
ABDOMINAL PAIN: 0
RESPIRATORY NEGATIVE: 1

## 2018-08-31 ASSESSMENT — PAIN DESCRIPTION - LOCATION: LOCATION: RIB CAGE

## 2018-08-31 ASSESSMENT — PAIN SCALES - WONG BAKER: WONGBAKER_NUMERICALRESPONSE: 0

## 2018-08-31 ASSESSMENT — PAIN DESCRIPTION - ORIENTATION: ORIENTATION: LEFT

## 2018-08-31 NOTE — ED NOTES
Pt arrives today with c/o L chest/rib pain and SOB with exertion. PT states she has had 2 previous spontaneous pneumo's and  States this is how it felt before it happened. Pt is lying cart, eupneic and in no distress.      Glendy Meza RN  08/31/18 5130

## 2018-08-31 NOTE — ED PROVIDER NOTES
 Supraventricular tachycardia ([facility] [address]      SURGICAL HISTORY       Past Surgical History:   Procedure Laterality Date    CHOLECYSTECTOMY, LAPAROSCOPIC  2010    COLONOSCOPY  9/2009    small hemorrhoids    COLONOSCOPY  7/2002    hemohhoids, hyperplastic polyp, right colon pathology-chronic inflammation in lumina propria with focal actue cryptitis consistent with active colitis    COLONOSCOPY  02/17/2016    flat sigmoid [id number]-[id number]-pcusufgzfexh    COLONOSCOPY  11/18/2017    NO RECURRENT POLYPS SEEN AT THE PREVIOUS POLYPECTOMY SITE.  hOWEVER IN THE SIGMOID COLON AT ABOUT 50 CM FROM THE ANUS FLAT POLYP SEEN. , PATHOLOGY-- LARGE INTESTINAL TYPE MUCOSA WITH MIXED FEATURES OF     DILATION AND CURETTAGE      AUB    GYNECOLOGIC CRYOSURGERY  30's     Wollard Trenton FLX W/REMOVAL LESION BY HOT BX FORCEPS N/A 11/18/2017    COLONOSCOPY POLYPECTOMY HOT BIOPSY, COLD BIOPSY, APC SIGMOID, SPOT MARKING SIGMOID performed by Sam Briones MD at [address] [place] [address]  2003    hemorrhoids, no colitis seen    TONSILLECTOMY AND ADENOIDECTOMY      child      CURRENT MEDICATIONS       Discharge Medication List as of 8/31/2018  2:51 PM      CONTINUE these medications which have NOT CHANGED    Details   famotidine (PEPCID) 20 MG tablet Take 20 mg by mouth nightly as neededHistorical Med      Cholecalciferol (VITAMIN D PO) Take 1 tablet by mouth dailyHistorical Med      HYDROcodone-acetaminophen (NORCO) 5-325 MG per tablet Take 1 tablet by mouth 2 times daily as needed for Pain ., Disp-60 tablet, R-0Print      ibuprofen (ADVIL) 200 MG tablet Take 200 mg by mouth every 6 hours as needed for PainHistorical Med      lisinopril-hydrochlorothiazide (PRINZIDE;ZESTORETIC) 20-12.5 MG per tablet Take 1 tablet by mouth daily, Disp-90 tablet, R-1Normal      atenolol (TENORMIN) 25 MG tablet Take 1 tablet by mouth 2 times daily, Disp-180 tablet, R-1Normal      LORazepam (ATIVAN) 0.5 MG tablet Take 1 tablet by mouth 2 times daily, Disp-60 or calf tenderness. Neurological: She is alert and oriented to person, place, and time. Skin: Skin is warm and dry. No rash noted. She is not diaphoretic. Nursing note and vitals reviewed. MEDICAL DECISION MAKING:   Chest pain. Reviewed results. CXR and cta negative. No evidence of pe or pneumothorax. EKG and trop negative x2. Otherwise labs nonacute. On reeval, pt appears well, no distress, nontoxic. Discussed results and disposition, obs vs discharge. Pt expressed appropriate understanding of risks and benefits. She would prefer discharge at this time. Discussed results and plan with the pt. They expressed appropriate understanding. Pt given close follow up, supportive care instructions and strict return instructions at the bedside. CRITICAL CARE:       PROCEDURES:    Procedures    DIAGNOSTIC RESULTS   EKG: All EKG's are interpreted by the Emergency Department Physician who either signs or Co-signs this chart in the absence of a cardiologist.  Ekg, rate of 61, nsr, no st seg changes, normal qrs, no acute ischemic changes. RADIOLOGY:All plain film, CT, MRI, and formal ultrasound images (except ED bedside ultrasound) are read by the radiologist, see reports below, unless otherwise noted in MDM or here. CT Chest Pulmonary Embolism W Contrast   Final Result   No central or segmental pulmonary embolus. No acute pulmonary process. XR CHEST STANDARD (2 VW)   Final Result   No convincing evidence for acute cardiopulmonary pathology. LABS: All lab results were reviewed by myself, and all abnormals are listed below.   Labs Reviewed   CBC WITH AUTO DIFFERENTIAL - Abnormal; Notable for the following:        Result Value    Monocytes 8 (*)     All other components within normal limits   COMPREHENSIVE METABOLIC PANEL - Abnormal; Notable for the following:     Glucose 108 (*)     Total Bilirubin 0.29 (*)     All other components within normal limits   APTT   PROTIME-INR TROPONIN   TROPONIN     EMERGENCY DEPARTMENT COURSE:   Vitals:    Vitals:    08/31/18 1136 08/31/18 1145 08/31/18 1400   BP: (!) 165/89 (!) 147/71 124/82   Pulse: 66 69 58   Resp:  14 14   Temp: 97.9 °F (36.6 °C)     TempSrc: Oral     SpO2: 98% 96% 96%   Weight: 195 lb (88.5 kg)     Height: 5' 7\" (1.702 m)         The patient was given the following medications while in the emergency department:  Orders Placed This Encounter   Medications    0.9 % sodium chloride bolus    DISCONTD: sodium chloride flush 0.9 % injection 10 mL    iopamidol (ISOVUE-370) 76 % injection 75 mL     CONSULTS:  None    FINAL IMPRESSION      1. Chest pain, unspecified type          DISPOSITION/PLAN   DISPOSITION Decision To Discharge 08/31/2018 02:50:21 PM      PATIENT REFERRED TO:  Tramaine Awan MD  [facility] 1999  [phone]    Call in 1 day      DISCHARGE MEDICATIONS:  Discharge Medication List as of 8/31/2018  2:51 PM        Madi Barrow MD  Attending Emergency Physician  [employer] voice recognition software used in portions of this document.                     Jennifer Aguilar MD  08/31/18 1787       Jennifer Aguilar MD  08/31/18 0450

## 2018-10-13 ENCOUNTER — HOSPITAL ENCOUNTER (OUTPATIENT)
Age: 63
Discharge: HOME | End: 2018-10-13
Payer: COMMERCIAL

## 2018-10-13 ENCOUNTER — HOSPITAL ENCOUNTER (OUTPATIENT)
Dept: WOMENS IMAGING | Age: 63
Discharge: HOME | End: 2018-10-15
Payer: COMMERCIAL

## 2018-10-13 DIAGNOSIS — Z12.39 SCREENING FOR BREAST CANCER: ICD-10-CM

## 2018-10-13 DIAGNOSIS — Z11.59 ENCOUNTER FOR HEPATITIS C SCREENING TEST FOR LOW RISK PATIENT: ICD-10-CM

## 2018-10-13 DIAGNOSIS — E78.00 PURE HYPERCHOLESTEROLEMIA: ICD-10-CM

## 2018-10-13 DIAGNOSIS — I10 HYPERTENSION, UNSPECIFIED TYPE: ICD-10-CM

## 2018-10-13 LAB
CHOLESTEROL/HDL RATIO: 4.2
CHOLESTEROL: 283 MG/DL
HDLC SERPL-MCNC: 67 MG/DL
HEPATITIS C ANTIBODY: NONREACTIVE
LDL CHOLESTEROL: 179 MG/DL (ref 0–130)
TRIGL SERPL-MCNC: 185 MG/DL
TSH SERPL DL<=0.05 MIU/L-ACNC: 0.9 MIU/L (ref 0.3–5)
VLDLC SERPL CALC-MCNC: ABNORMAL MG/DL (ref 1–30)

## 2018-10-13 PROCEDURE — 36415 COLL VENOUS BLD VENIPUNCTURE: CPT

## 2018-10-13 PROCEDURE — 80061 LIPID PANEL: CPT

## 2018-10-13 PROCEDURE — 77063 BREAST TOMOSYNTHESIS BI: CPT

## 2018-10-13 PROCEDURE — 84443 ASSAY THYROID STIM HORMONE: CPT

## 2018-10-13 PROCEDURE — 77067 SCR MAMMO BI INCL CAD: CPT

## 2018-10-13 PROCEDURE — 86803 HEPATITIS C AB TEST: CPT

## 2019-02-28 DIAGNOSIS — F43.21 GRIEF: ICD-10-CM

## 2019-02-28 DIAGNOSIS — F41.9 ANXIETY: Primary | ICD-10-CM

## 2019-02-28 RX ORDER — LORAZEPAM 0.5 MG/1
TABLET ORAL
Qty: 60 TABLET | Refills: 0 | Status: SHIPPED | OUTPATIENT
Start: 2019-02-28 | End: 2019-04-28

## 2019-11-04 ENCOUNTER — HOSPITAL ENCOUNTER (OUTPATIENT)
Dept: GENERAL RADIOLOGY | Age: 64
Discharge: HOME | End: 2019-11-06
Payer: MEDICARE

## 2019-11-04 ENCOUNTER — HOSPITAL ENCOUNTER (OUTPATIENT)
Age: 64
Discharge: HOME | End: 2019-11-06
Payer: MEDICARE

## 2019-11-04 ENCOUNTER — HOSPITAL ENCOUNTER (OUTPATIENT)
Dept: MRI IMAGING | Age: 64
Discharge: HOME | End: 2019-11-06
Payer: MEDICARE

## 2019-11-04 DIAGNOSIS — I10 ESSENTIAL HYPERTENSION: ICD-10-CM

## 2019-11-04 DIAGNOSIS — M48.02 CERVICAL STENOSIS OF SPINAL CANAL: ICD-10-CM

## 2019-11-04 PROCEDURE — 72141 MRI NECK SPINE W/O DYE: CPT

## 2019-11-04 PROCEDURE — 71046 X-RAY EXAM CHEST 2 VIEWS: CPT

## 2019-12-30 ENCOUNTER — HOSPITAL ENCOUNTER (OUTPATIENT)
Dept: WOMENS IMAGING | Age: 64
Discharge: HOME | End: 2020-01-01
Payer: MEDICARE

## 2019-12-30 PROCEDURE — 77080 DXA BONE DENSITY AXIAL: CPT

## 2019-12-30 PROCEDURE — 77063 BREAST TOMOSYNTHESIS BI: CPT

## 2020-08-24 ENCOUNTER — HOSPITAL ENCOUNTER (EMERGENCY)
Age: 65
Discharge: HOME | End: 2020-08-24
Attending: STUDENT IN AN ORGANIZED HEALTH CARE EDUCATION/TRAINING PROGRAM
Payer: MEDICARE

## 2020-08-24 ENCOUNTER — APPOINTMENT (OUTPATIENT)
Dept: CT IMAGING | Age: 65
End: 2020-08-24
Payer: MEDICARE

## 2020-08-24 VITALS
TEMPERATURE: 98.5 F | BODY MASS INDEX: 30.61 KG/M2 | OXYGEN SATURATION: 96 % | SYSTOLIC BLOOD PRESSURE: 120 MMHG | HEART RATE: 73 BPM | RESPIRATION RATE: 16 BRPM | HEIGHT: 67 IN | DIASTOLIC BLOOD PRESSURE: 76 MMHG | WEIGHT: 195 LBS

## 2020-08-24 PROCEDURE — 72131 CT LUMBAR SPINE W/O DYE: CPT

## 2020-08-24 PROCEDURE — 93971 EXTREMITY STUDY: CPT

## 2020-08-24 PROCEDURE — 99284 EMERGENCY DEPT VISIT MOD MDM: CPT

## 2020-08-24 RX ORDER — CYCLOBENZAPRINE HCL 10 MG
10 TABLET ORAL NIGHTLY PRN
Qty: 10 TABLET | Refills: 0 | Status: SHIPPED | OUTPATIENT
Start: 2020-08-24 | End: 2020-09-03

## 2020-08-24 ASSESSMENT — ENCOUNTER SYMPTOMS
BACK PAIN: 1
DIARRHEA: 0
NAUSEA: 0
EYE ITCHING: 0
COUGH: 0
ABDOMINAL PAIN: 0
VOMITING: 0
PHOTOPHOBIA: 0
RHINORRHEA: 0
COLOR CHANGE: 0
SHORTNESS OF BREATH: 0
FACIAL SWELLING: 0

## 2020-08-24 ASSESSMENT — PAIN SCALES - GENERAL: PAINLEVEL_OUTOF10: 4

## 2020-08-24 NOTE — ED PROVIDER NOTES
EMERGENCY DEPARTMENT ENCOUNTER    Pt Name: Mary Mahan  MRN: [id number]  [place] 1955  Date of evaluation: 8/24/20  CHIEF COMPLAINT       Chief Complaint   Patient presents with    Leg Pain     left     HISTORY OF PRESENT ILLNESS   HPI  66-year-old female history of arthritis, hyperlipidemia presents with chief complaint of atraumatic left leg pain. Symptoms have been present for about a day. Constant nature. Worse with lying flat and walking. Mild lower extremity swelling associated with this. No fever chills. Does have some lumbar back pain which radiates around her leg. No antecedent trauma. No saddle anesthesias. No IV drug use. No recent weight loss. REVIEW OF SYSTEMS     Review of Systems   Constitutional: Negative for chills and fatigue. HENT: Negative for facial swelling, postnasal drip and rhinorrhea. Eyes: Negative for photophobia and itching. Respiratory: Negative for cough and shortness of breath. Cardiovascular: Negative for chest pain and leg swelling. Gastrointestinal: Negative for abdominal pain, diarrhea, nausea and vomiting. Genitourinary: Negative for dysuria, flank pain and hematuria. Musculoskeletal: Positive for arthralgias and back pain. Negative for joint swelling. Skin: Negative for color change and rash. Neurological: Negative for dizziness, numbness and headaches.      PASTMEDICAL HISTORY     Past Medical History:   Diagnosis Date    Arthritis     Colon polyps 11/18/2017    COPD (chronic obstructive pulmonary disease) (HCC)     GERD (gastroesophageal reflux disease)     Heart palpitations     History of colon polyps 2002    Hyperlipidemia     Hyperplastic colon polyp 2016    Hypertension     Pneumothorax     spontaneous    Supraventricular tachycardia (HCC)      Past Problem List  Patient Active Problem List   Diagnosis Code    Hyperlipidemia E78.5    Heart palpitations R00.2    [facility] J44.9  History of colon polyps Z86.010    Change in bowel function R19.8    Rectal bleeding K62.5    Hyperplastic colon polyp K63.5    Diarrhea R19.7    Colon polyps K63.5     SURGICAL HISTORY       Past Surgical History:   Procedure Laterality Date    CHOLECYSTECTOMY, LAPAROSCOPIC  2010    COLONOSCOPY  9/2009    small hemorrhoids    COLONOSCOPY  7/2002    hemohhoids, hyperplastic polyp, right colon pathology-chronic inflammation in lumina propria with focal actue cryptitis consistent with active colitis    COLONOSCOPY  02/17/2016    flat sigmoid ezccp-uhdokqjci-qlwirpvfkfwz    COLONOSCOPY  11/18/2017    NO RECURRENT POLYPS SEEN AT THE PREVIOUS POLYPECTOMY SITE.  hOWEVER IN THE SIGMOID COLON AT ABOUT 50 CM FROM THE ANUS FLAT POLYP SEEN. , PATHOLOGY-- LARGE INTESTINAL TYPE MUCOSA WITH MIXED FEATURES OF     DILATION AND CURETTAGE      AUB    GYNECOLOGIC CRYOSURGERY  30's     Joseph Jha FLX W/REMOVAL LESION BY HOT BX FORCEPS N/A 11/18/2017    COLONOSCOPY POLYPECTOMY HOT BIOPSY, COLD BIOPSY, APC SIGMOID, SPOT MARKING SIGMOID performed by Lea Wolfe MD at [address]  2003    hemorrhoids, no colitis seen    TONSILLECTOMY AND ADENOIDECTOMY      child      CURRENT MEDICATIONS       Discharge Medication List as of 8/24/2020  5:27 PM      CONTINUE these medications which have NOT CHANGED    Details   atenolol (TENORMIN) 25 MG tablet Take bid, Disp-180 tablet,R-1Normal      ciprofloxacin-dexamethasone (CIPRODEX) 0.3-0.1 % otic suspension Instill 1-2 drops into each ear as directed, Disp-1 Bottle,R-2Normal      LORazepam (ATIVAN) 0.5 MG tablet Take 1 tablet by mouth daily for 30 days. , Disp-30 tablet,R-0Normal      lisinopril-hydroCHLOROthiazide (PRINZIDE;ZESTORETIC) 20-12.5 MG per tablet take 1 tablet by mouth once daily, Disp-90 tablet,R-0Normal      ibuprofen (ADVIL;MOTRIN) 800 MG tablet Take 1 tablet by mouth 2 times daily as needed for Pain, Disp-60 tablet, R-5Normal      famotidine (PEPCID) 20 MG tablet Take 20 mg by mouth nightly as neededHistorical Med      Ascorbic Acid (MAHNAZ-C PO) Take 1 tablet by mouth dailyHistorical Med      Cholecalciferol (VITAMIN D PO) Take 1 tablet by mouth dailyHistorical Med           ALLERGIES     is allergic to avelox [moxifloxacin]; biaxin [clarithromycin]; epinephrine; neosporin [neomycin-polymyxin-gramicidin]; sulfa antibiotics; adhesive tape; and phenylephrine. FAMILY HISTORY     She indicated that her mother is alive. She indicated that her father is . She indicated that her sister is alive. She indicated that her brother is alive. She indicated that the status of her maternal grandmother is unknown. She indicated that the status of her paternal grandfather is unknown. She indicated that both of her maternal aunts are . She indicated that her maternal uncle is . She indicated that her paternal aunt is alive. She indicated that both of her others are alive. She indicated that the status of her neg hx is unknown. SOCIAL HISTORY       Social History     Tobacco Use    Smoking status: Former Smoker     Packs/day: 1.00     Years: 20.00     Pack years: 20.00     Types: Cigarettes     Last attempt to quit: 9/15/1996     Years since quittin.9    Smokeless tobacco: Never Used   Substance Use Topics    Alcohol use: Yes     Alcohol/week: 0.0 standard drinks     Comment: occ    Drug use: No     PHYSICAL EXAM     INITIAL VITALS: /76   Pulse 73   Temp 98.5 °F (36.9 °C) (Oral)   Resp 16   Ht 5' 7\" (1.702 m)   Wt 195 lb (88.5 kg)   LMP 1997   SpO2 96%   BMI 30.54 kg/m²    Physical Exam  Constitutional:       Appearance: She is normal weight. HENT:      Head: Normocephalic and atraumatic. Eyes:      Extraocular Movements: Extraocular movements intact. Pupils: Pupils are equal, round, and reactive to light. Neck:      Musculoskeletal: Normal range of motion and neck supple.    Cardiovascular:      Rate and Rhythm: incompletely evaluated. Focused renal ultrasound may be performed. VL DUP LOWER EXTREMITY VENOUS LEFT    (Results Pending)     LABS: All lab results were reviewed by myself, and all abnormals are listed below. Labs Reviewed - No data to display    EMERGENCY DEPARTMENTCOURSE:         Vitals:    Vitals:    08/24/20 1225 08/24/20 1636   BP: (!) 151/87 120/76   Pulse: 74 73   Resp: 16 16   Temp: 98.5 °F (36.9 °C)    TempSrc: Oral    SpO2: 98% 96%   Weight: 195 lb (88.5 kg)    Height: 5' 7\" (1.702 m)        The patient was given the following medications while in the emergency department:  Orders Placed This Encounter   Medications    cyclobenzaprine (FLEXERIL) 10 MG tablet     Sig: Take 1 tablet by mouth nightly as needed for Muscle spasms     Dispense:  10 tablet     Refill:  0     CONSULTS:  None    FINAL IMPRESSION      1.  Radiculopathy, unspecified spinal region          DISPOSITION/PLAN   DISPOSITION Decision To Discharge 08/24/2020 05:26:44 PM      PATIENT REFERRED TO:  Lela Munoz MD  [facility] 1999  [phone]    Call in 1 day      DISCHARGE MEDICATIONS:  Discharge Medication List as of 8/24/2020  5:27 PM      START taking these medications    Details   cyclobenzaprine (FLEXERIL) 10 MG tablet Take 1 tablet by mouth nightly as needed for Muscle spasms, Disp-10 tablet,R-0Print           Lee Ann Bradshaw MD  Attending Emergency Physician                    Lee Ann Bradshaw MD  08/24/20 2011

## 2020-08-24 NOTE — ED TRIAGE NOTES
Mode of arrival (squad #, walk in, police, etc) : walk in        Chief complaint(s): leg pain        Arrival Note (brief scenario, treatment PTA, etc). : Pt with inner left leg pain since yesterday AM. Pt reports pain with ambulation. No swelling or redness noted. Pt denies any injury or trauma to area. Pt is A&Ox4, in no acute distress, respirations even and unlabored, ambulatory with steady gait. C= \"Have you ever felt that you should Cut down on your drinking? \"  No  A= \"Have people Annoyed you by criticizing your drinking? \"  No  G= \"Have you ever felt bad or Guilty about your drinking? \"  No  E= \"Have you ever had a drink as an Eye-opener first thing in the morning to steady your nerves or to help a hangover? \"  No      Deferred []      Reason for deferring: N/A    *If yes to two or more: probable alcohol abuse. *

## 2020-08-26 ENCOUNTER — HOSPITAL ENCOUNTER (OUTPATIENT)
Dept: MRI IMAGING | Age: 65
Discharge: HOME | End: 2020-08-28
Payer: MEDICARE

## 2020-08-26 PROCEDURE — 72148 MRI LUMBAR SPINE W/O DYE: CPT

## 2020-09-09 ENCOUNTER — HOSPITAL ENCOUNTER (OUTPATIENT)
Dept: MRI IMAGING | Facility: CLINIC | Age: 65
Discharge: HOME | End: 2020-09-11
Payer: MEDICARE

## 2020-09-09 PROCEDURE — 73721 MRI JNT OF LWR EXTRE W/O DYE: CPT

## 2020-09-21 ENCOUNTER — OFFICE VISIT (OUTPATIENT)
Dept: ORTHOPEDIC SURGERY | Age: 65
End: 2020-09-21
Payer: MEDICARE

## 2020-09-21 PROCEDURE — 99203 OFFICE O/P NEW LOW 30 MIN: CPT | Performed by: ORTHOPAEDIC SURGERY

## 2020-09-21 PROCEDURE — 3017F COLORECTAL CA SCREEN DOC REV: CPT | Performed by: ORTHOPAEDIC SURGERY

## 2020-09-21 PROCEDURE — G8417 CALC BMI ABV UP PARAM F/U: HCPCS | Performed by: ORTHOPAEDIC SURGERY

## 2020-09-21 PROCEDURE — 1090F PRES/ABSN URINE INCON ASSESS: CPT | Performed by: ORTHOPAEDIC SURGERY

## 2020-09-21 PROCEDURE — G8399 PT W/DXA RESULTS DOCUMENT: HCPCS | Performed by: ORTHOPAEDIC SURGERY

## 2020-09-21 PROCEDURE — 1036F TOBACCO NON-USER: CPT | Performed by: ORTHOPAEDIC SURGERY

## 2020-09-21 PROCEDURE — G8428 CUR MEDS NOT DOCUMENT: HCPCS | Performed by: ORTHOPAEDIC SURGERY

## 2020-09-21 PROCEDURE — 4040F PNEUMOC VAC/ADMIN/RCVD: CPT | Performed by: ORTHOPAEDIC SURGERY

## 2020-09-21 PROCEDURE — 1123F ACP DISCUSS/DSCN MKR DOCD: CPT | Performed by: ORTHOPAEDIC SURGERY

## 2020-09-21 NOTE — PROGRESS NOTES
Desi Rodriguez M.D.            [address] [place] [address]., [address] [facility],[address], [address] [facility]           Dept Phone: [phone]           Dept Fax:  [phone]           Rj Arredondo          Dept Phone: [phone]           Dept Fax:  [phone]      Chief Compliant:  Chief Complaint   Patient presents with    Pain     Lt knee        History of Present Illness: This is a 75 y.o. female who presents to the clinic today for evaluation / follow up of acute pain to the left knee. Patient states that several weeks ago she had difficulties just ambulating. She says she cannot woke up this way. She states that she did used to have some tightness and achiness in his knee before however it got to the point where now where she walks with a pretty significant antalgic gait and almost a pseudo-locked knee. .       Review of Systems   Constitutional: Negative for fever, chills, sweats. Eyes: Negative for changes in vision, or pain. HENT: Negative for ear ache, epistaxis, or sore throat. Respiratory/Cardio: Negative for Chest pain, palpitations, SOB, or cough. Gastrointestinal: Negative for abdominal pain, N/V/D. Genitourinary: Negative for dysuria, frequency, urgency, or hematuria. Neurological: Negative for headache, numbness, or weakness. Integumentary: Negative for rash, itching, laceration, or abrasion. Musculoskeletal: Positive for Pain (Lt knee)       Physical Exam:  Constitutional: Patient is oriented to person, place, and time. Patient appears well-developed and well nourished. HENT: Negative otherwise noted  Head: Normocephalic and Atraumatic  Nose: Normal  Eyes: Conjunctivae and EOM are normal  Neck: Normal range of motion Neck supple. Respiratory/Cardio: Effort normal. No respiratory distress.   Musculoskeletal: Lamination the patient left knee notes a very antalgic gait. Motion is 0 to 115 degrees. She has pain after that. Collaterals and cruciates appear to be good. She has a very positive reproducible Andrez's medially. Neurological: Patient is alert and oriented to person, place, and time. Normal strenght. No sensory deficit. Skin: Skin is warm and dry  Psychiatric: Behavior is normal. Thought content normal.  Nursing note and vitals reviewed. Labs and Imaging:     XR taken in early September show some moderate to early significant medial joint space narrowing what appears to be in a very mild osteochondral defect with a rather large effusion. Right knee notes some narrowing as well. Patient did have an MRI also of her left knee which shows a significant complex multidirectional tear of the medial meniscus with extrusion. Also significant arthrosis of the patellofemoral joint as well as the medial compartment     No orders of the defined types were placed in this encounter. Assessment and Plan:  1. Acute pain of left knee            This is a 75 y.o. female who presents to the clinic today for evaluation / follow up of medial meniscus tear left knee with pre-existing degenerative joint disease. Past History:    Current Outpatient Medications:     HYDROcodone-acetaminophen (NORCO) 5-325 MG per tablet, Take 1 tablet by mouth 2 times daily for 14 days. , Disp: 28 tablet, Rfl: 0    HYDROcodone-acetaminophen (NORCO) 5-325 MG per tablet, Take 1 tablet by mouth 2 times daily as needed for Pain for up to 30 days.  Intended supply: 30 days, Disp: 60 tablet, Rfl: 0    atenolol (TENORMIN) 25 MG tablet, Take bid, Disp: 180 tablet, Rfl: 1    ciprofloxacin-dexamethasone (CIPRODEX) 0.3-0.1 % otic suspension, Instill 1-2 drops into each ear as directed, Disp: 1 Bottle, Rfl: 2    lisinopril-hydroCHLOROthiazide (PRINZIDE;ZESTORETIC) 20-12.5 MG per tablet, take 1 tablet by mouth once daily, Disp: 90 tablet, Rfl: 0    ibuprofen (ADVIL;MOTRIN) 800 MG tablet, Take 1 tablet by mouth 2 times daily as needed for Pain, Disp: 60 tablet, Rfl: 5    famotidine (PEPCID) 20 MG tablet, Take 20 mg by mouth nightly as needed, Disp: , Rfl:     Ascorbic Acid (MAHNAZ-C PO), Take 1 tablet by mouth daily, Disp: , Rfl:     Cholecalciferol (VITAMIN D PO), Take 1 tablet by mouth daily, Disp: , Rfl:   Allergies   Allergen Reactions    Avelox [Moxifloxacin]     Biaxin [Clarithromycin] Hives    Epinephrine Other (See Comments)     Increased BP, Tachycardia    Neosporin [Neomycin-Polymyxin-Gramicidin]     Sulfa Antibiotics     Adhesive Tape Rash    Phenylephrine Rash     Social History     Socioeconomic History    Marital status:      Spouse name: Not on file    Number of children: Not on file    Years of education: Not on file    Highest education level: Not on file   Occupational History    Not on file   Social Needs    Financial resource strain: Not on file    Food insecurity     Worry: Not on file     Inability: Not on file    Transportation needs     Medical: Not on file     Non-medical: Not on file   Tobacco Use    Smoking status: Former Smoker     Packs/day: 1.00     Years: 20.00     Pack years: 20.00     Types: Cigarettes     Last attempt to quit: 9/15/1996     Years since quittin.0    Smokeless tobacco: Never Used   Substance and Sexual Activity    Alcohol use:  Yes     Alcohol/week: 0.0 standard drinks     Comment: occ    Drug use: No    Sexual activity: Yes     Partners: Male     Birth control/protection: Surgical     Comment:  vasectomy   Lifestyle    Physical activity     Days per week: Not on file     Minutes per session: Not on file    Stress: Not on file   Relationships    Social connections     Talks on phone: Not on file     Gets together: Not on file     Attends Yazidi service: Not on file     Active member of club or organization: Not on file     Attends meetings of clubs or organizations: Not on file Relationship status: Not on file    Intimate partner violence     Fear of current or ex partner: Not on file     Emotionally abused: Not on file     Physically abused: Not on file     Forced sexual activity: Not on file   Other Topics Concern    Not on file   Social History Narrative    Not on file     Past Medical History:   Diagnosis Date    Arthritis     Colon polyps 11/18/2017    COPD [facility]chronic obstructive [facility] disease) ([facility] [address]     GERD (gastroesophageal reflux disease)     Heart palpitations     History of colon polyps 2002    Hyperlipidemia     Hyperplastic colon polyp 2016    Hypertension     Pneumothorax     spontaneous    Supraventricular tachycardia ([facility] [address]      Past Surgical History:   Procedure Laterality Date    CHOLECYSTECTOMY, LAPAROSCOPIC  2010    COLONOSCOPY  9/2009    small hemorrhoids    COLONOSCOPY  7/2002    hemohhoids, hyperplastic polyp, right colon pathology-chronic inflammation in lumina propria with focal actue cryptitis consistent with active colitis    COLONOSCOPY  02/17/2016    flat sigmoid [id number]    COLONOSCOPY  11/18/2017    NO RECURRENT POLYPS SEEN AT THE PREVIOUS POLYPECTOMY SITE.  hOWEVER IN THE SIGMOID COLON AT ABOUT 50 CM FROM THE ANUS FLAT POLYP SEEN. , PATHOLOGY-- LARGE INTESTINAL TYPE MUCOSA WITH MIXED FEATURES OF     DILATION AND CURETTAGE      AUB    GYNECOLOGIC CRYOSURGERY  29's    [place] COLSC FLX W/REMOVAL LESION BY HOT BX FORCEPS N/A 11/18/2017    COLONOSCOPY POLYPECTOMY HOT BIOPSY, COLD BIOPSY, APC SIGMOID, SPOT MARKING SIGMOID performed by Cj Saunders MD at [address] [place] [address]  2003    hemorrhoids, no colitis seen    TONSILLECTOMY AND ADENOIDECTOMY      child      Family History   Problem Relation Age of Onset    Cancer Father 28        bone sarcoma    Hypertension Mother    [employer] Other Mother         peripheral neuropathy    Uterine Cancer Maternal Aunt     Breast Cancer Other         dx first before age 48 and again @ 79    Colon Cancer Maternal Aunt         all dx before age 48    Colon Cancer Maternal Uncle         1 uncle in 42's dx, other in 68's    Colon Cancer Other         multiple cousins dx before age 48   [place] Uterine Cancer Paternal Aunt         dx after    Ovarian Cancer Paternal Aunt         dx after age 48   [place] Lung Cancer Paternal Aunt         dx after 48    Colon Cancer Paternal Grandfather         dx after age 48    Colon Cancer Maternal Grandmother 55    Liver Cancer Maternal Grandmother     Diabetes Neg Hx     Eclampsia Neg Hx      Labor Neg Hx     Spont Abortions Neg Hx     Stroke Neg Hx     Depression Neg Hx    Plan  Patient was advised that she benefit from arthroscopic examination of her left knee. She is aware of all risk and benefits. She was advised that she is looking at arthroplasty but probably down the road. She is basically walking with a pseudolocking I think she is best suited for a knee scope. We will get her scheduled accordingly      Provider Attestation:  Henny Loyd, personally performed the services described in this documentation. All medical record entries made by the scribe were at my direction and in my presence. I have reviewed the chart and discharge instructions and agree that the records reflect my personal performance and is accurate and complete. Aliyah Cao MD. 20      Please note that this chart was generated using voice recognition Dragon dictation software. Although every effort was made to ensure the accuracy of this automated transcription, some errors in transcription may have occurred.

## 2020-09-25 ENCOUNTER — HOSPITAL ENCOUNTER (OUTPATIENT)
Dept: PREADMISSION TESTING | Age: 65
Discharge: HOME | End: 2020-09-29
Payer: MEDICARE

## 2020-09-25 VITALS
SYSTOLIC BLOOD PRESSURE: 127 MMHG | DIASTOLIC BLOOD PRESSURE: 80 MMHG | OXYGEN SATURATION: 99 % | RESPIRATION RATE: 20 BRPM | HEART RATE: 73 BPM | HEIGHT: 67 IN | BODY MASS INDEX: 30.61 KG/M2 | WEIGHT: 195 LBS | TEMPERATURE: 98 F

## 2020-09-25 LAB
ABSOLUTE EOS #: 0.3 K/UL (ref 0–0.4)
ABSOLUTE IMMATURE GRANULOCYTE: ABNORMAL K/UL (ref 0–0.3)
ABSOLUTE LYMPH #: 1.6 K/UL (ref 1–4.8)
ABSOLUTE MONO #: 0.4 K/UL (ref 0.1–1.3)
ANION GAP SERPL CALCULATED.3IONS-SCNC: 11 MMOL/L (ref 9–17)
BASOPHILS # BLD: 1 % (ref 0–2)
BASOPHILS ABSOLUTE: 0.1 K/UL (ref 0–0.2)
BUN BLDV-MCNC: 33 MG/DL (ref 8–23)
BUN/CREAT BLD: ABNORMAL (ref 9–20)
CALCIUM SERPL-MCNC: 9.5 MG/DL (ref 8.6–10.4)
CHLORIDE BLD-SCNC: 102 MMOL/L (ref 98–107)
CO2: 25 MMOL/L (ref 20–31)
CREAT SERPL-MCNC: 1.17 MG/DL (ref 0.5–0.9)
DIFFERENTIAL TYPE: ABNORMAL
EOSINOPHILS RELATIVE PERCENT: 5 % (ref 0–4)
GFR AFRICAN AMERICAN: 56 ML/MIN
GFR NON-AFRICAN AMERICAN: 46 ML/MIN
GFR SERPL CREATININE-BSD FRML MDRD: ABNORMAL ML/MIN/{1.73_M2}
GFR SERPL CREATININE-BSD FRML MDRD: ABNORMAL ML/MIN/{1.73_M2}
GLUCOSE BLD-MCNC: 104 MG/DL (ref 70–99)
HCT VFR BLD CALC: 39.6 % (ref 36–46)
HEMOGLOBIN: 13.4 G/DL (ref 12–16)
IMMATURE GRANULOCYTES: ABNORMAL %
LYMPHOCYTES # BLD: 29 % (ref 24–44)
MCH RBC QN AUTO: 32 PG (ref 26–34)
MCHC RBC AUTO-ENTMCNC: 33.7 G/DL (ref 31–37)
MCV RBC AUTO: 94.8 FL (ref 80–100)
MONOCYTES # BLD: 8 % (ref 1–7)
NRBC AUTOMATED: ABNORMAL PER 100 WBC
PDW BLD-RTO: 12.9 % (ref 11.5–14.9)
PLATELET # BLD: 274 K/UL (ref 150–450)
PLATELET ESTIMATE: ABNORMAL
PMV BLD AUTO: 8.8 FL (ref 6–12)
POTASSIUM SERPL-SCNC: 4.6 MMOL/L (ref 3.7–5.3)
RBC # BLD: 4.17 M/UL (ref 4–5.2)
RBC # BLD: ABNORMAL 10*6/UL
SEG NEUTROPHILS: 57 % (ref 36–66)
SEGMENTED NEUTROPHILS ABSOLUTE COUNT: 3.1 K/UL (ref 1.3–9.1)
SODIUM BLD-SCNC: 138 MMOL/L (ref 135–144)
WBC # BLD: 5.4 K/UL (ref 3.5–11)
WBC # BLD: ABNORMAL 10*3/UL

## 2020-09-25 PROCEDURE — 80048 BASIC METABOLIC PNL TOTAL CA: CPT

## 2020-09-25 PROCEDURE — 93005 ELECTROCARDIOGRAM TRACING: CPT | Performed by: ANESTHESIOLOGY

## 2020-09-25 PROCEDURE — 85025 COMPLETE CBC W/AUTO DIFF WBC: CPT

## 2020-09-25 PROCEDURE — 36415 COLL VENOUS BLD VENIPUNCTURE: CPT

## 2020-09-25 RX ORDER — VIT C/B6/B5/MAGNESIUM/HERB 173 50-5-6-5MG
CAPSULE ORAL DAILY
COMMUNITY
End: 2022-02-09

## 2020-09-25 RX ORDER — M-VIT,TX,IRON,MINS/CALC/FOLIC 27MG-0.4MG
1 TABLET ORAL DAILY
COMMUNITY

## 2020-09-25 NOTE — H&P
HISTORY and [id number]       NAME:  Gurdeep Henderson  MRN: [id number]   YOB: 1955   Date: 9/25/2020   Age: 75 y.o. Gender: female       COMPLAINT AND PRESENT HISTORY:     Gurdeep Henderson is 75 y.o.,  female, undergoing preadmission testing for left Knee ARTHROSCOPY PARTIAL MEDIAL MENISCECTOMY. Pt states she notice severe pain in the left leg, knee, and thigh started 3 weeks ago. She woke up and she had hard time to walk or stand on her left leg. Pt went to the ER and  has CT lumbar spine. Then she went to [facility] and has MRI on the left knee. She reports her left knee some time tightness and achiness,  buckle, give way under the her,and locking up. Pt denies any  recent falls or trauma. No redness, swelling or rashes. Pt denies fever/chills, chest pain. Pt feel some time SOB she has history of COPD. Pt denies problem with anesthesia , No Hx of MRSA infections in the past.    Tenderness on palpation of the Lt Knee joint space worse on the medial aspect. Lt Knee Arthroscopy  Lt Knee Meniscus Tear    MRI KNEE LEFT WO CONTRAST      Impression    1. Complex multidirectional tearing of the medial meniscus with outward    extrusion of the medial meniscus body. 2. 1.7 x 1.9 cm osteochondral lesion at the medial femoral condyle with    subjacent reactive marrow edema. 3. 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 4. Moderate to severe patellofemoral chondromalacia.  Mild lateral    compartment chondromalacia.  Tricompartmental osteoarthrosis. 5. Mild edema in the subcutaneous fat about the knee. 6. Moderate joint effusion. 7. Mild diffuse patellar tendinosis.             PAST MEDICAL HISTORY     Past Medical History:   Diagnosis Date    Arthritis     Colon polyps 11/18/2017    COPD (chronic obstructive pulmonary disease) (HCC)     GERD (gastroesophageal reflux disease)     Heart palpitations     History of colon polyps 2002    Hyperlipidemia     Hyperplastic colon polyp 2016    Hypertension     Kidney infection 2004    Pneumothorax     spontaneous x 2, chest tube    Supraventricular tachycardia ([address]     first episode about 20 years ago, well controlled now with Atenolol         SURGICAL HISTORY       Past Surgical History:   Procedure Laterality Date    CHOLECYSTECTOMY, LAPAROSCOPIC  2010    COLONOSCOPY  9/2009    small hemorrhoids    COLONOSCOPY  7/2002    hemohhoids, hyperplastic polyp, right colon pathology-chronic inflammation in lumina propria with focal actue cryptitis consistent with active colitis    COLONOSCOPY  02/17/2016    flat sigmoid [id number]    COLONOSCOPY  11/18/2017    NO RECURRENT POLYPS SEEN AT THE PREVIOUS POLYPECTOMY SITE.  hOWEVER IN THE SIGMOID COLON AT ABOUT 50 CM FROM THE ANUS FLAT POLYP SEEN. , PATHOLOGY-- LARGE INTESTINAL TYPE MUCOSA WITH MIXED FEATURES OF     DILATION AND CURETTAGE      AUB    GYNECOLOGIC CRYOSURGERY  29's    [place] COLSC FLX W/REMOVAL LESION BY HOT BX FORCEPS N/A 11/18/2017    COLONOSCOPY POLYPECTOMY HOT BIOPSY, COLD BIOPSY, APC SIGMOID, SPOT MARKING SIGMOID performed by Felipe Sandhu MD at [address]  2003    hemorrhoids, no colitis seen    TONSILLECTOMY AND ADENOIDECTOMY      child        FAMILY HISTORY       Family History   Problem Relation Age of Onset    Cancer Father 28        bone sarcoma    Hypertension Mother    [employer] [place] Other Mother         peripheral neuropathy    Uterine Cancer Maternal Aunt     Breast Cancer Other         dx first before age 48 and again @ 79    Colon Cancer Maternal Aunt         all dx before age 48    Colon Cancer Maternal Uncle         1 uncle in 42's dx, other in 66's    Colon Cancer Other         multiple cousins dx before age 48   [place] Uterine Cancer Paternal Aunt         dx after    Ovarian Cancer Paternal Aunt         dx after age 48   [place] Lung Cancer Paternal Aunt         dx after 48     Neosporin [Neomycin-Polymyxin-Gramicidin]     Sulfa Antibiotics     Adhesive Tape Rash    Phenylephrine Rash       Current Outpatient Medications on File Prior to Encounter   Medication Sig Dispense Refill    Multiple Vitamins-Minerals (THERAPEUTIC MULTIVITAMIN-MINERALS) tablet Take 1 tablet by mouth daily      Turmeric 500 MG CAPS Take by mouth daily      HYDROcodone-acetaminophen (NORCO) 5-325 MG per tablet Take 1 tablet by mouth 2 times daily as needed for Pain for up to 30 days. Intended supply: 30 days 60 tablet 0    atenolol (TENORMIN) 25 MG tablet Take bid 180 tablet 1    ciprofloxacin-dexamethasone (CIPRODEX) 0.3-0.1 % otic suspension Instill 1-2 drops into each ear as directed 1 Bottle 2    lisinopril-hydroCHLOROthiazide (PRINZIDE;ZESTORETIC) 20-12.5 MG per tablet take 1 tablet by mouth once daily 90 tablet 0    ibuprofen (ADVIL;MOTRIN) 800 MG tablet Take 1 tablet by mouth 2 times daily as needed for Pain 60 tablet 5    famotidine (PEPCID) 20 MG tablet Take 20 mg by mouth nightly as needed      Cholecalciferol (VITAMIN D PO) Take 1 tablet by mouth daily       No current facility-administered medications on file prior to encounter. General health:  Fairly good. No fever or chills. Skin:  No itching, redness or rash. HEENT:  No headache, epistaxis or sore throat. Neck:  No pain, stiffness or masses. Cardiovascular/Respiratory system:  No chest pain, palpitation or shortness of breath. Gastrointestinal tract: No abdominal pain, Dysphagia, nausea, vomiting, diarrhea or constipation. Genitourinary:  No burning on micturition. No hesitancy, urgency, frequency or discoloration of urine. Locomotor:  No bone or joint pains. No swelling. Neuropsychiatric:  No referable complaints. See HPI.     GENERAL PHYSICAL EXAM:     Vitals: /80   Pulse 73

## 2020-09-25 NOTE — H&P (VIEW-ONLY)
HISTORY and [id number]       NAME:  Manju Medina  MRN: [id number]   YOB: 1955   Date: 9/25/2020   Age: 75 y.o. Gender: female       COMPLAINT AND PRESENT HISTORY:     Manju Medina is 75 y.o.,  female, undergoing preadmission testing for left Knee ARTHROSCOPY PARTIAL MEDIAL MENISCECTOMY. Pt states she notice severe pain in the left leg, knee, and thigh started 3 weeks ago. She woke up and she had hard time to walk or stand on her left leg. Pt went to the ER and  has CT lumbar spine. Then she went to Dr [facility] and has MRI on the left knee. She reports her left knee some time tightness and achiness,  buckle, give way under the her,and locking up. Pt denies any  recent falls or trauma. No redness, swelling or rashes. Pt denies fever/chills, chest pain. Pt feel some time SOB she has history of COPD. Pt denies problem with anesthesia , No Hx of MRSA infections in the past.    Tenderness on palpation of the Lt Knee joint space worse on the medial aspect. Lt Knee Arthroscopy  Lt Knee Meniscus Tear    MRI KNEE LEFT WO CONTRAST      Impression    1. Complex multidirectional tearing of the medial meniscus with outward    extrusion of the medial meniscus body. 2. 1.7 x 1.9 cm osteochondral lesion at the medial femoral condyle with    subjacent reactive marrow edema. 3. 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 4. Moderate to severe patellofemoral chondromalacia.  Mild lateral    compartment chondromalacia.  Tricompartmental osteoarthrosis. 5. Mild edema in the subcutaneous fat about the knee. 6. Moderate joint effusion. 7. Mild diffuse patellar tendinosis.             PAST MEDICAL HISTORY     Past Medical History:   Diagnosis Date    Arthritis     Colon polyps 11/18/2017    COPD (chronic obstructive pulmonary disease) (HCC)     GERD (gastroesophageal reflux disease)     Heart palpitations     History of colon polyps 2002    Hyperlipidemia     Hyperplastic colon polyp 2016    Hypertension     Kidney infection 2004    Pneumothorax     spontaneous x 2, chest tube    Supraventricular tachycardia ([address]     first episode about 20 years ago, well controlled now with Atenolol         SURGICAL HISTORY       Past Surgical History:   Procedure Laterality Date    CHOLECYSTECTOMY, LAPAROSCOPIC  2010    COLONOSCOPY  9/2009    small hemorrhoids    COLONOSCOPY  7/2002    hemohhoids, hyperplastic polyp, right colon pathology-chronic inflammation in lumina propria with focal actue cryptitis consistent with active colitis    COLONOSCOPY  02/17/2016    flat sigmoid [id number]    COLONOSCOPY  11/18/2017    NO RECURRENT POLYPS SEEN AT THE PREVIOUS POLYPECTOMY SITE.  hOWEVER IN THE SIGMOID COLON AT ABOUT 50 CM FROM THE ANUS FLAT POLYP SEEN. , PATHOLOGY-- LARGE INTESTINAL TYPE MUCOSA WITH MIXED FEATURES OF     DILATION AND CURETTAGE      AUB    GYNECOLOGIC CRYOSURGERY  29's    MS COLSC FLX W/REMOVAL LESION BY HOT BX FORCEPS N/A 11/18/2017    COLONOSCOPY POLYPECTOMY HOT BIOPSY, COLD BIOPSY, APC SIGMOID, SPOT MARKING SIGMOID performed by Татьяна Heredia MD at [address] [place] [address]  2003    hemorrhoids, no colitis seen    TONSILLECTOMY AND ADENOIDECTOMY      child        FAMILY HISTORY       Family History   Problem Relation Age of Onset    Cancer Father 28        bone sarcoma    Hypertension Mother    Rhea Keane Other Mother         peripheral neuropathy    Uterine Cancer Maternal Aunt     Breast Cancer Other         dx first before age 48 and again @ 79    Colon Cancer Maternal Aunt         all dx before age 48    Colon Cancer Maternal Uncle         1 uncle in 42's dx, other in 66's    Colon Cancer Other         multiple cousins dx before age 48   Rhea Keane Uterine Cancer Paternal Aunt         dx after    Ovarian Cancer Paternal Aunt         dx after age 48   Rhea Keane Lung Cancer Paternal Aunt         dx after 48    Colon Cancer Paternal Grandfather         dx after age 48    Colon Cancer Maternal Grandmother 55    Liver Cancer Maternal Grandmother     Diabetes Neg Hx     Eclampsia Neg Hx      Labor Neg Hx     Spont Abortions Neg Hx     Stroke Neg Hx     Depression Neg Hx        SOCIAL HISTORY       Social History     Socioeconomic History    Marital status:      Spouse name: None    Number of children: None    Years of education: None    Highest education level: None   Occupational History    None   Social Needs    Financial resource strain: None    Food insecurity     Worry: None     Inability: None    Transportation needs     Medical: None     Non-medical: None   Tobacco Use    Smoking status: Former Smoker     Packs/day: 1.00     Years: 20.00     Pack years: 20.00     Types: Cigarettes     Last attempt to quit: 9/15/1996     Years since quittin.0    Smokeless tobacco: Never Used   Substance and Sexual Activity    Alcohol use:  Yes     Alcohol/week: 0.0 standard drinks     Comment: occasional    Drug use: No    Sexual activity: Yes     Partners: Male     Birth control/protection: Surgical     Comment:  vasectomy   Lifestyle    Physical activity     Days per week: None     Minutes per session: None    Stress: None   Relationships    Social connections     Talks on phone: None     Gets together: None     Attends Oriental orthodox service: None     Active member of club or organization: None     Attends meetings of clubs or organizations: None     Relationship status: None    Intimate partner violence     Fear of current or ex partner: None     Emotionally abused: None     Physically abused: None     Forced sexual activity: None   Other Topics Concern    None   Social History Narrative    None           REVIEW OF SYSTEMS      Allergies   Allergen Reactions    Avelox [Moxifloxacin]     Biaxin [Clarithromycin] Hives    Epinephrine Other (See Comments)     Increased BP, Tachycardia  Neosporin [Neomycin-Polymyxin-Gramicidin]     Sulfa Antibiotics     Adhesive Tape Rash    Phenylephrine Rash       Current Outpatient Medications on File Prior to Encounter   Medication Sig Dispense Refill    Multiple Vitamins-Minerals (THERAPEUTIC MULTIVITAMIN-MINERALS) tablet Take 1 tablet by mouth daily      Turmeric 500 MG CAPS Take by mouth daily      HYDROcodone-acetaminophen (NORCO) 5-325 MG per tablet Take 1 tablet by mouth 2 times daily as needed for Pain for up to 30 days. Intended supply: 30 days 60 tablet 0    atenolol (TENORMIN) 25 MG tablet Take bid 180 tablet 1    ciprofloxacin-dexamethasone (CIPRODEX) 0.3-0.1 % otic suspension Instill 1-2 drops into each ear as directed 1 Bottle 2    lisinopril-hydroCHLOROthiazide (PRINZIDE;ZESTORETIC) 20-12.5 MG per tablet take 1 tablet by mouth once daily 90 tablet 0    ibuprofen (ADVIL;MOTRIN) 800 MG tablet Take 1 tablet by mouth 2 times daily as needed for Pain 60 tablet 5    famotidine (PEPCID) 20 MG tablet Take 20 mg by mouth nightly as needed      Cholecalciferol (VITAMIN D PO) Take 1 tablet by mouth daily       No current facility-administered medications on file prior to encounter. General health:  Fairly good. No fever or chills. Skin:  No itching, redness or rash. HEENT:  No headache, epistaxis or sore throat. Neck:  No pain, stiffness or masses. Cardiovascular/Respiratory system:  No chest pain, palpitation or shortness of breath. Gastrointestinal tract: No abdominal pain, Dysphagia, nausea, vomiting, diarrhea or constipation. Genitourinary:  No burning on micturition. No hesitancy, urgency, frequency or discoloration of urine. Locomotor:  No bone or joint pains. No swelling. Neuropsychiatric:  No referable complaints. See HPI.     GENERAL PHYSICAL EXAM:     Vitals: /80   Pulse 73 Temp 98 °F (36.7 °C)   Resp 20   Ht 5' 6.5\" (1.689 m)   Wt 195 lb (88.5 kg)   LMP 06/26/1997   SpO2 99%   BMI 31.00 kg/m²  Body mass index is 31 kg/m². GENERAL APPEARANCE:   Jenny Smith is 75 y.o.,  female, mildly obese, nourished, conscious, alert. Does not appear to be distress or pain at this time. SKIN:  Warm, dry, no cyanosis or jaundice. HEAD:  Normocephalic, atraumatic, no swelling or tenderness. EYES:  Pupils equal, reactive to light. EARS:  No discharge, no marked hearing loss. NOSE:  No rhinorrhea, epistaxis or septal deformity. THROAT:  Not congested. No ulceration bleeding or discharge. NECK:  No stiffness, trachea central.                  CHEST:  Symmetrical and equal on expansion. HEART:  RRR S1 > S2. No audible murmurs or gallops. LUNGS:  Equal on expansion, normal breath sounds. No adventitious sounds. ABDOMEN:  Obese. Soft on palpation. No localized tenderness. No guarding or rigidity. No palpable hepatosplenomegaly. LYMPHATICS:  No palpable cervical lymphadenopathy. LOCOMOTOR, BACK AND SPINE:  No tenderness or deformities. EXTREMITIES:  Symmetrical, no pretibial edema. Katinas sign negative or no calf tenderness. No discoloration or ulcerations. Tenderness on palpation of the Lt Knee joint space worse on the medial aspect. Pt has antalgic gait and using crutches, limited ROM in the left knee    NEUROLOGIC:  The patient is conscious, alert, oriented,Cranial nerve II-XII intact, taste and smell were not examined. No apparent focal sensory or motor deficits.                                                                                      PROVISIONAL DIAGNOSES / SURGERY:      LEFT KNEE MEDIAL MENISCUS  KNEE ARTHROSCOPY PARTIAL MEDIAL MENISCECTOMY Left     Patient Active Problem List    Diagnosis Date Noted    Colon polyps 11/18/2017    Diarrhea 09/27/2017    Hyperplastic colon polyp     Change in bowel function 02/10/2016    Rectal bleeding 02/10/2016    History of colon polyps     COPD (chronic obstructive pulmonary disease) (HCC)     Hyperlipidemia     Heart palpitations            LORNA Koehler CNP on 9/25/2020 at 9:13 AM

## 2020-09-26 LAB
EKG ATRIAL RATE: 63 BPM
EKG P AXIS: 64 DEGREES
EKG P-R INTERVAL: 148 MS
EKG Q-T INTERVAL: 422 MS
EKG QRS DURATION: 88 MS
EKG QTC CALCULATION (BAZETT): 431 MS
EKG R AXIS: 11 DEGREES
EKG T AXIS: 51 DEGREES
EKG VENTRICULAR RATE: 63 BPM

## 2020-09-26 PROCEDURE — 93010 ELECTROCARDIOGRAM REPORT: CPT | Performed by: INTERNAL MEDICINE

## 2020-09-28 ENCOUNTER — ANESTHESIA EVENT (OUTPATIENT)
Dept: OPERATING ROOM | Age: 65
End: 2020-09-28
Payer: MEDICARE

## 2020-09-28 RX ORDER — SODIUM CHLORIDE 0.9 % (FLUSH) 0.9 %
10 SYRINGE (ML) INJECTION EVERY 12 HOURS SCHEDULED
Status: CANCELLED | OUTPATIENT
Start: 2020-09-28

## 2020-09-28 RX ORDER — LIDOCAINE HYDROCHLORIDE 10 MG/ML
1 INJECTION, SOLUTION EPIDURAL; INFILTRATION; INTRACAUDAL; PERINEURAL
Status: CANCELLED | OUTPATIENT
Start: 2020-09-28 | End: 2020-09-28

## 2020-09-28 RX ORDER — SODIUM CHLORIDE 0.9 % (FLUSH) 0.9 %
10 SYRINGE (ML) INJECTION PRN
Status: CANCELLED | OUTPATIENT
Start: 2020-09-28

## 2020-09-28 RX ORDER — SODIUM CHLORIDE, SODIUM LACTATE, POTASSIUM CHLORIDE, CALCIUM CHLORIDE 600; 310; 30; 20 MG/100ML; MG/100ML; MG/100ML; MG/100ML
INJECTION, SOLUTION INTRAVENOUS CONTINUOUS
Status: CANCELLED | OUTPATIENT
Start: 2020-09-28

## 2020-10-02 ENCOUNTER — HOSPITAL ENCOUNTER (OUTPATIENT)
Dept: PREADMISSION TESTING | Age: 65
Setting detail: SPECIMEN
Discharge: HOME | End: 2020-10-06
Payer: MEDICARE

## 2020-10-02 PROCEDURE — U0003 INFECTIOUS AGENT DETECTION BY NUCLEIC ACID (DNA OR RNA); SEVERE ACUTE RESPIRATORY SYNDROME CORONAVIRUS 2 (SARS-COV-2) (CORONAVIRUS DISEASE [COVID-19]), AMPLIFIED PROBE TECHNIQUE, MAKING USE OF HIGH THROUGHPUT TECHNOLOGIES AS DESCRIBED BY CMS-2020-01-R: HCPCS

## 2020-10-04 LAB — SARS-COV-2, NAA: NOT DETECTED

## 2020-10-06 ENCOUNTER — ANESTHESIA (OUTPATIENT)
Dept: OPERATING ROOM | Age: 65
End: 2020-10-06
Payer: MEDICARE

## 2020-10-06 ENCOUNTER — HOSPITAL ENCOUNTER (OUTPATIENT)
Age: 65
Setting detail: OUTPATIENT SURGERY
Discharge: HOME HEALTH | End: 2020-10-06
Attending: ORTHOPAEDIC SURGERY | Admitting: ORTHOPAEDIC SURGERY
Payer: MEDICARE

## 2020-10-06 VITALS — TEMPERATURE: 97 F | DIASTOLIC BLOOD PRESSURE: 59 MMHG | SYSTOLIC BLOOD PRESSURE: 103 MMHG | OXYGEN SATURATION: 98 %

## 2020-10-06 VITALS
RESPIRATION RATE: 16 BRPM | HEIGHT: 67 IN | TEMPERATURE: 96.8 F | DIASTOLIC BLOOD PRESSURE: 61 MMHG | HEART RATE: 78 BPM | SYSTOLIC BLOOD PRESSURE: 131 MMHG | BODY MASS INDEX: 30.61 KG/M2 | OXYGEN SATURATION: 97 % | WEIGHT: 195 LBS

## 2020-10-06 PROCEDURE — 6360000002 HC RX W HCPCS: Performed by: ORTHOPAEDIC SURGERY

## 2020-10-06 PROCEDURE — 7100000001 HC PACU RECOVERY - ADDTL 15 MIN: Performed by: ORTHOPAEDIC SURGERY

## 2020-10-06 PROCEDURE — 7100000030 HC ASPR PHASE II RECOVERY - FIRST 15 MIN: Performed by: ORTHOPAEDIC SURGERY

## 2020-10-06 PROCEDURE — 2500000003 HC RX 250 WO HCPCS: Performed by: NURSE ANESTHETIST, CERTIFIED REGISTERED

## 2020-10-06 PROCEDURE — 3700000001 HC ADD 15 MINUTES (ANESTHESIA): Performed by: ORTHOPAEDIC SURGERY

## 2020-10-06 PROCEDURE — 6360000002 HC RX W HCPCS: Performed by: NURSE ANESTHETIST, CERTIFIED REGISTERED

## 2020-10-06 PROCEDURE — 3700000000 HC ANESTHESIA ATTENDED CARE: Performed by: ORTHOPAEDIC SURGERY

## 2020-10-06 PROCEDURE — 7100000031 HC ASPR PHASE II RECOVERY - ADDTL 15 MIN: Performed by: ORTHOPAEDIC SURGERY

## 2020-10-06 PROCEDURE — 7100000011 HC PHASE II RECOVERY - ADDTL 15 MIN: Performed by: ORTHOPAEDIC SURGERY

## 2020-10-06 PROCEDURE — 2709999900 HC NON-CHARGEABLE SUPPLY: Performed by: ORTHOPAEDIC SURGERY

## 2020-10-06 PROCEDURE — 7100000000 HC PACU RECOVERY - FIRST 15 MIN: Performed by: ORTHOPAEDIC SURGERY

## 2020-10-06 PROCEDURE — 2580000003 HC RX 258: Performed by: ANESTHESIOLOGY

## 2020-10-06 PROCEDURE — 3600000013 HC SURGERY LEVEL 3 ADDTL 15MIN: Performed by: ORTHOPAEDIC SURGERY

## 2020-10-06 PROCEDURE — 3600000003 HC SURGERY LEVEL 3 BASE: Performed by: ORTHOPAEDIC SURGERY

## 2020-10-06 PROCEDURE — 29881 ARTHRS KNE SRG MNISECTMY M/L: CPT | Performed by: ORTHOPAEDIC SURGERY

## 2020-10-06 PROCEDURE — 7100000010 HC PHASE II RECOVERY - FIRST 15 MIN: Performed by: ORTHOPAEDIC SURGERY

## 2020-10-06 RX ORDER — ONDANSETRON 2 MG/ML
INJECTION INTRAMUSCULAR; INTRAVENOUS PRN
Status: DISCONTINUED | OUTPATIENT
Start: 2020-10-06 | End: 2020-10-06 | Stop reason: SDUPTHER

## 2020-10-06 RX ORDER — MORPHINE SULFATE 2 MG/ML
2 INJECTION, SOLUTION INTRAMUSCULAR; INTRAVENOUS EVERY 5 MIN PRN
Status: DISCONTINUED | OUTPATIENT
Start: 2020-10-06 | End: 2020-10-06 | Stop reason: HOSPADM

## 2020-10-06 RX ORDER — MIDAZOLAM HYDROCHLORIDE 1 MG/ML
INJECTION INTRAMUSCULAR; INTRAVENOUS PRN
Status: DISCONTINUED | OUTPATIENT
Start: 2020-10-06 | End: 2020-10-06 | Stop reason: SDUPTHER

## 2020-10-06 RX ORDER — HYDROCODONE BITARTRATE AND ACETAMINOPHEN 5; 325 MG/1; MG/1
1 TABLET ORAL EVERY 6 HOURS PRN
Qty: 30 TABLET | Refills: 0 | Status: SHIPPED | OUTPATIENT
Start: 2020-10-06 | End: 2020-10-14

## 2020-10-06 RX ORDER — SODIUM CHLORIDE, SODIUM LACTATE, POTASSIUM CHLORIDE, CALCIUM CHLORIDE 600; 310; 30; 20 MG/100ML; MG/100ML; MG/100ML; MG/100ML
INJECTION, SOLUTION INTRAVENOUS CONTINUOUS
Status: DISCONTINUED | OUTPATIENT
Start: 2020-10-06 | End: 2020-10-06 | Stop reason: HOSPADM

## 2020-10-06 RX ORDER — LIDOCAINE HYDROCHLORIDE 10 MG/ML
INJECTION, SOLUTION EPIDURAL; INFILTRATION; INTRACAUDAL; PERINEURAL PRN
Status: DISCONTINUED | OUTPATIENT
Start: 2020-10-06 | End: 2020-10-06 | Stop reason: SDUPTHER

## 2020-10-06 RX ORDER — HYDROCODONE BITARTRATE AND ACETAMINOPHEN 5; 325 MG/1; MG/1
2 TABLET ORAL PRN
Status: DISCONTINUED | OUTPATIENT
Start: 2020-10-06 | End: 2020-10-06 | Stop reason: HOSPADM

## 2020-10-06 RX ORDER — FENTANYL CITRATE 50 UG/ML
INJECTION, SOLUTION INTRAMUSCULAR; INTRAVENOUS PRN
Status: DISCONTINUED | OUTPATIENT
Start: 2020-10-06 | End: 2020-10-06 | Stop reason: SDUPTHER

## 2020-10-06 RX ORDER — ONDANSETRON 2 MG/ML
4 INJECTION INTRAMUSCULAR; INTRAVENOUS
Status: DISCONTINUED | OUTPATIENT
Start: 2020-10-06 | End: 2020-10-06 | Stop reason: HOSPADM

## 2020-10-06 RX ORDER — LABETALOL HYDROCHLORIDE 5 MG/ML
5 INJECTION, SOLUTION INTRAVENOUS EVERY 10 MIN PRN
Status: DISCONTINUED | OUTPATIENT
Start: 2020-10-06 | End: 2020-10-06 | Stop reason: HOSPADM

## 2020-10-06 RX ORDER — DIPHENHYDRAMINE HYDROCHLORIDE 50 MG/ML
12.5 INJECTION INTRAMUSCULAR; INTRAVENOUS
Status: DISCONTINUED | OUTPATIENT
Start: 2020-10-06 | End: 2020-10-06 | Stop reason: HOSPADM

## 2020-10-06 RX ORDER — FENTANYL CITRATE 50 UG/ML
50 INJECTION, SOLUTION INTRAMUSCULAR; INTRAVENOUS EVERY 5 MIN PRN
Status: DISCONTINUED | OUTPATIENT
Start: 2020-10-06 | End: 2020-10-06 | Stop reason: HOSPADM

## 2020-10-06 RX ORDER — PROPOFOL 10 MG/ML
INJECTION, EMULSION INTRAVENOUS PRN
Status: DISCONTINUED | OUTPATIENT
Start: 2020-10-06 | End: 2020-10-06 | Stop reason: SDUPTHER

## 2020-10-06 RX ORDER — SODIUM CHLORIDE 0.9 % (FLUSH) 0.9 %
10 SYRINGE (ML) INJECTION EVERY 12 HOURS SCHEDULED
Status: DISCONTINUED | OUTPATIENT
Start: 2020-10-06 | End: 2020-10-06 | Stop reason: HOSPADM

## 2020-10-06 RX ORDER — METOCLOPRAMIDE HYDROCHLORIDE 5 MG/ML
10 INJECTION INTRAMUSCULAR; INTRAVENOUS
Status: DISCONTINUED | OUTPATIENT
Start: 2020-10-06 | End: 2020-10-06 | Stop reason: HOSPADM

## 2020-10-06 RX ORDER — DEXAMETHASONE SODIUM PHOSPHATE 4 MG/ML
INJECTION, SOLUTION INTRA-ARTICULAR; INTRALESIONAL; INTRAMUSCULAR; INTRAVENOUS; SOFT TISSUE PRN
Status: DISCONTINUED | OUTPATIENT
Start: 2020-10-06 | End: 2020-10-06 | Stop reason: SDUPTHER

## 2020-10-06 RX ORDER — MEPERIDINE HYDROCHLORIDE 25 MG/ML
12.5 INJECTION INTRAMUSCULAR; INTRAVENOUS; SUBCUTANEOUS EVERY 5 MIN PRN
Status: DISCONTINUED | OUTPATIENT
Start: 2020-10-06 | End: 2020-10-06 | Stop reason: HOSPADM

## 2020-10-06 RX ORDER — LIDOCAINE HYDROCHLORIDE 10 MG/ML
1 INJECTION, SOLUTION EPIDURAL; INFILTRATION; INTRACAUDAL; PERINEURAL
Status: DISCONTINUED | OUTPATIENT
Start: 2020-10-06 | End: 2020-10-06 | Stop reason: HOSPADM

## 2020-10-06 RX ORDER — HYDROCODONE BITARTRATE AND ACETAMINOPHEN 5; 325 MG/1; MG/1
1 TABLET ORAL PRN
Status: DISCONTINUED | OUTPATIENT
Start: 2020-10-06 | End: 2020-10-06 | Stop reason: HOSPADM

## 2020-10-06 RX ORDER — HYDRALAZINE HYDROCHLORIDE 20 MG/ML
5 INJECTION INTRAMUSCULAR; INTRAVENOUS EVERY 10 MIN PRN
Status: DISCONTINUED | OUTPATIENT
Start: 2020-10-06 | End: 2020-10-06 | Stop reason: HOSPADM

## 2020-10-06 RX ORDER — SODIUM CHLORIDE 0.9 % (FLUSH) 0.9 %
10 SYRINGE (ML) INJECTION PRN
Status: DISCONTINUED | OUTPATIENT
Start: 2020-10-06 | End: 2020-10-06 | Stop reason: HOSPADM

## 2020-10-06 RX ORDER — ROPIVACAINE HYDROCHLORIDE 5 MG/ML
INJECTION, SOLUTION EPIDURAL; INFILTRATION; PERINEURAL PRN
Status: DISCONTINUED | OUTPATIENT
Start: 2020-10-06 | End: 2020-10-06 | Stop reason: ALTCHOICE

## 2020-10-06 RX ORDER — FENTANYL CITRATE 50 UG/ML
25 INJECTION, SOLUTION INTRAMUSCULAR; INTRAVENOUS EVERY 5 MIN PRN
Status: DISCONTINUED | OUTPATIENT
Start: 2020-10-06 | End: 2020-10-06 | Stop reason: HOSPADM

## 2020-10-06 RX ADMIN — ONDANSETRON 4 MG: 2 INJECTION INTRAMUSCULAR; INTRAVENOUS at 11:40

## 2020-10-06 RX ADMIN — FENTANYL CITRATE 50 MCG: 50 INJECTION, SOLUTION INTRAMUSCULAR; INTRAVENOUS at 11:33

## 2020-10-06 RX ADMIN — DEXAMETHASONE SODIUM PHOSPHATE 4 MG: 4 INJECTION, SOLUTION INTRA-ARTICULAR; INTRALESIONAL; INTRAMUSCULAR; INTRAVENOUS; SOFT TISSUE at 11:40

## 2020-10-06 RX ADMIN — FENTANYL CITRATE 50 MCG: 50 INJECTION, SOLUTION INTRAMUSCULAR; INTRAVENOUS at 11:39

## 2020-10-06 RX ADMIN — SODIUM CHLORIDE, POTASSIUM CHLORIDE, SODIUM LACTATE AND CALCIUM CHLORIDE: 600; 310; 30; 20 INJECTION, SOLUTION INTRAVENOUS at 09:30

## 2020-10-06 RX ADMIN — LIDOCAINE HYDROCHLORIDE 50 MG: 10 INJECTION, SOLUTION EPIDURAL; INFILTRATION; INTRACAUDAL; PERINEURAL at 11:29

## 2020-10-06 RX ADMIN — MIDAZOLAM 2 MG: 1 INJECTION INTRAMUSCULAR; INTRAVENOUS at 11:25

## 2020-10-06 RX ADMIN — PROPOFOL 200 MG: 10 INJECTION, EMULSION INTRAVENOUS at 11:29

## 2020-10-06 ASSESSMENT — PULMONARY FUNCTION TESTS
PIF_VALUE: 15
PIF_VALUE: 16
PIF_VALUE: 20
PIF_VALUE: 14
PIF_VALUE: 14
PIF_VALUE: 0
PIF_VALUE: 14
PIF_VALUE: 10
PIF_VALUE: 15
PIF_VALUE: 14
PIF_VALUE: 14
PIF_VALUE: 11
PIF_VALUE: 0
PIF_VALUE: 15
PIF_VALUE: 12
PIF_VALUE: 0
PIF_VALUE: 15
PIF_VALUE: 15
PIF_VALUE: 14
PIF_VALUE: 14
PIF_VALUE: 10
PIF_VALUE: 12
PIF_VALUE: 15
PIF_VALUE: 15
PIF_VALUE: 2
PIF_VALUE: 15
PIF_VALUE: 1
PIF_VALUE: 14
PIF_VALUE: 14
PIF_VALUE: 16
PIF_VALUE: 10
PIF_VALUE: 15
PIF_VALUE: 2
PIF_VALUE: 4
PIF_VALUE: 1
PIF_VALUE: 14
PIF_VALUE: 15
PIF_VALUE: 14
PIF_VALUE: 16

## 2020-10-06 ASSESSMENT — ENCOUNTER SYMPTOMS: STRIDOR: 0

## 2020-10-06 ASSESSMENT — PAIN - FUNCTIONAL ASSESSMENT
PAIN_FUNCTIONAL_ASSESSMENT: 0-10
PAIN_FUNCTIONAL_ASSESSMENT: PREVENTS OR INTERFERES SOME ACTIVE ACTIVITIES AND ADLS

## 2020-10-06 ASSESSMENT — PAIN DESCRIPTION - DESCRIPTORS
DESCRIPTORS: ACHING
DESCRIPTORS: ACHING;SHOOTING;SHARP;BURNING

## 2020-10-06 ASSESSMENT — PAIN DESCRIPTION - PAIN TYPE: TYPE: SURGICAL PAIN

## 2020-10-06 ASSESSMENT — PAIN SCALES - GENERAL
PAINLEVEL_OUTOF10: 0
PAINLEVEL_OUTOF10: 1

## 2020-10-06 ASSESSMENT — COPD QUESTIONNAIRES: CAT_SEVERITY: NO INTERVAL CHANGE

## 2020-10-06 ASSESSMENT — PAIN DESCRIPTION - LOCATION: LOCATION: KNEE

## 2020-10-06 ASSESSMENT — PAIN DESCRIPTION - ORIENTATION: ORIENTATION: LEFT

## 2020-10-06 NOTE — ANESTHESIA POSTPROCEDURE EVALUATION
POST- ANESTHESIA EVALUATION       Pt Name: Stephanie Francis  MRN: [id number]  YOB: 1955  Date of evaluation: 10/6/2020  Time:  12:29 PM      /78   Pulse 64   Temp 97.4 °F (36.3 °C) (Infrared)   Resp 12   Ht 5' 6.5\" (1.689 m)   Wt 195 lb (88.5 kg)   LMP 06/26/1997   SpO2 95%   BMI 31.00 kg/m²      Consciousness Level  Awake  Cardiopulmonary Status  Stable  Pain Adequately Treated YES  Nausea / Vomiting  NO  Adequate Hydration  YES  Anesthesia Related Complications NONE      Electronically signed by Julia Hickey MD on 10/6/2020 at 12:29 PM       Department of Anesthesiology  Postprocedure Note    Patient: Stephanie Francis  MRN: [id number]  YOB: 1955  Date of evaluation: 10/6/2020  Time:  12:29 PM     Procedure Summary     Date:  10/06/20 Room / Location:  [address] [place] [address] / [address] [place] [address]    Anesthesia Start:  1125 Anesthesia Stop:  1209    Procedure:  KNEE ARTHROSCOPY PARTIAL MEDIAL MENISCECTOMY (Left Knee) Diagnosis:  (LEFT KNEE MEDIAL MENISCUS)    Surgeon:  Philipp Han MD Responsible Provider:  Julia Hickey MD    Anesthesia Type:  general ASA Status:  2          Anesthesia Type: general    Nicholas Phase I: Nicholas Score: 8    Nicholas Phase II:      Last vitals: Reviewed and per EMR flowsheets.        Anesthesia Post Evaluation

## 2020-10-06 NOTE — ANESTHESIA PRE PROCEDURE
infusion   Intravenous Continuous [place] MD Rick 125 mL/hr at 10/06/20 0930      lidocaine PF 1 % injection 1 mL  1 mL Intradermal Once PRN Merced Collet, MD        sodium chloride flush 0.9 % injection 10 mL  10 mL Intravenous 2 times per day Merced Collet, MD        sodium chloride flush 0.9 % injection 10 mL  10 mL Intravenous PRN Merced Collet, MD           Allergies:     Allergies   Allergen Reactions    Avelox [Moxifloxacin]     Biaxin [Clarithromycin] Hives    Epinephrine Other (See Comments)     Increased BP, Tachycardia    Neosporin [Neomycin-Polymyxin-Gramicidin]     Sulfa Antibiotics     Adhesive Tape Rash    Phenylephrine Rash       Problem List:    Patient Active Problem List   Diagnosis Code    Hyperlipidemia E78.5    Heart palpitations R00.2    COPD (chronic obstructive pulmonary disease) ([address] J44.9    History of colon polyps Z86.010    Change in bowel function R19.8    Rectal bleeding K62.5    Hyperplastic colon polyp K63.5    Diarrhea R19.7    Colon polyps K63.5       Past Medical History:        Diagnosis Date    Arthritis     Colon polyps 11/18/2017    COPD (chronic obstructive pulmonary disease) (HCC)     GERD (gastroesophageal reflux disease)     Heart palpitations     History of colon polyps 2002    Hyperlipidemia     Hyperplastic colon polyp 2016    Hypertension     Kidney infection 2004    Pneumothorax     spontaneous x 2, chest tube    Supraventricular tachycardia ([address]     first episode about 20 years ago, well controlled now with Atenolol       Past Surgical History:        Procedure Laterality Date    CHOLECYSTECTOMY, LAPAROSCOPIC  2010    COLONOSCOPY  9/2009    small hemorrhoids    COLONOSCOPY  7/2002    hemohhoids, hyperplastic polyp, right colon pathology-chronic inflammation in lumina propria with focal actue cryptitis consistent with active colitis    COLONOSCOPY  02/17/2016    flat sigmoid aubam-xkzsvrqiv-ifuddkrqukpe    COLONOSCOPY 2017    NO RECURRENT POLYPS SEEN AT THE PREVIOUS POLYPECTOMY SITE.  hOWEVER IN THE SIGMOID COLON AT ABOUT 50 CM FROM THE ANUS FLAT POLYP SEEN. , PATHOLOGY-- LARGE INTESTINAL TYPE MUCOSA WITH MIXED FEATURES OF     DILATION AND CURETTAGE      AUB    GYNECOLOGIC CRYOSURGERY  29's     Joseph Jha FLX W/REMOVAL LESION BY HOT BX FORCEPS N/A 2017    COLONOSCOPY POLYPECTOMY HOT BIOPSY, COLD BIOPSY, APC SIGMOID, SPOT MARKING SIGMOID performed by Joseluis Zimmerman MD at [address] [place] [address]      hemorrhoids, no colitis seen    TONSILLECTOMY AND ADENOIDECTOMY      child        Social History:    Social History     Tobacco Use    Smoking status: Former Smoker     Packs/day: 1.00     Years: 20.00     Pack years: 20.00     Types: Cigarettes     Last attempt to quit: 9/15/1996     Years since quittin.0    Smokeless tobacco: Never Used   Substance Use Topics    Alcohol use: Yes     Alcohol/week: 0.0 standard drinks     Comment: occasional                                Counseling given: Not Answered      Vital Signs (Current):   Vitals:    10/06/20 0912   BP: 108/72   Pulse: 70   Resp: 18   Temp: 97.1 °F (36.2 °C)   TempSrc: Infrared   SpO2: 100%   Weight: 195 lb (88.5 kg)   Height: 5' 6.5\" (1.689 m)                                              BP Readings from Last 3 Encounters:   10/06/20 108/72   20 127/80   20 130/70       NPO Status: Time of last liquid consumption: 2300                        Time of last solid consumption: 2200                        Date of last liquid consumption: 10/05/20                        Date of last solid food consumption: 10/05/20    BMI:   Wt Readings from Last 3 Encounters:   10/06/20 195 lb (88.5 kg)   20 195 lb (88.5 kg)   20 195 lb (88.5 kg)     Body mass index is 31 kg/m².     CBC:   Lab Results   Component Value Date    WBC 5.4 2020    RBC 4.17 2020    RBC 4.25 2012    HGB 13.4 2020    HCT 39.6 2020    MCV Abdominal:           Vascular: negative vascular ROS. Anesthesia Plan      general     ASA 2       Induction: intravenous. MIPS: Postoperative opioids intended and Prophylactic antiemetics administered. Anesthetic plan and risks discussed with patient. Plan discussed with CRNA.                   Kalin Izaguirre MD   10/6/2020

## 2020-10-06 NOTE — INTERVAL H&P NOTE
Update History & Physical    The patient's History and Physical of September 25, 2020 was reviewed with the patient and I examined the patient. There was no change. I concur with findings. Here today for left knee arthroscopy partial medial meniscectomy. NPO. Took lisinopril and atenolol this am with sip of water. Stopped ibuprofen about one week ago. Denies chest pain/pressure, palpitations, dyspnea on exertion, headaches, dizziness, changes in vision, N/V/D or constipation, fever or chills. Review vitals per RN flowsheet.      Electronically signed by LORNA Salazar CNP on 10/6/2020 at 8:54 AM

## 2020-10-06 NOTE — OP NOTE
Operative Note      Patient: Pato Huston  YOB: 1955  MRN: [id number]    Date of Procedure: 10/6/2020    Pre-Op Diagnosis: LEFT KNEE MEDIAL MENISCUS    Post-Op Diagnosis: Same       Procedure(s):  KNEE ARTHROSCOPY PARTIAL MEDIAL MENISCECTOMY    Surgeon(s):  Ceci Marie MD    Assistant:   Resident: Colin Guzman DO    Anesthesia: General    Estimated Blood Loss (mL): Minimal    Complications: None    Specimens:   * No specimens in log *    Implants:  * No implants in log *      Drains: * No LDAs found *    Findings: Tear of the posterior horn medial meniscus near the root as well as grade IV chondromalacia medial tibial plateau of the left knee    Detailed Description of Procedure:       Patient is a 75y.o. year old female who presents with a history of pain, locking, and giving away sensations of their left knee. Physical examination was positive for Andrez's examination. MRI was consistent with tear of the posterior horn of the medial meniscus as well as significant chondromalacia of the medial compartment. Having failed conservative treatment, it was advised that arthroscopic examination and treatment of their knee would be beneficial and consent was obtained with risk and benefits explained to the patient. The patient was taken tot he operative room and general anesthesia was administered. A tourniquet was placed to the operatives lower extremity and then placed in the leg ely. The leg was exsanguinated and the tourniquet inflated to the 300mmHg. The leg was the prepped and draped in the usual sterile fashion. Time-out was called to verify laterality. Medial and lateral portals were made and the scope placed in the lateral portal. The patella femoral joint was examined and noted to to grade III chondromalacia but nothing requiring intervention. The scope was then passes down the medial gutter into the medial compartment.  A probe was used to assess the medial meniscus and a tear was identified in the posterior horn near the root of the medial meniscus. A partial medial menisectomy was carried ou with basket forceps and then smoothed out with a shaver. Repeat probing of the meniscus found it to be stable. There was an area of exposed bone on the medial aspect and medial tibial plateau indicating grade IV chondromalacia. The overlying femoral lesion was noted with grade III chondromalacia. The arthroscope was then passed into the notch of the knee. The ACL was found not to have any significant damage or laxity. The scope was then passed in the lateral compartment. Thorough probing of the lateral meniscus and the articular cartilage did not demonstrate any significant finding that requires surgical intervention    The scope was then passed into the suprapatellar area and the shaver was used to remove any further soft tissue debris. The scope was removed and the knee evacuated of fluid and injected with 20cc of 0.5% ropivacaine. The sterile bulky dressing was applied and the leg then wrapped with a large 6-inch ace bandage from toes to the mid-thigh. The tourniquet was then deflated at less than 30 minutes. The patient was awaken and taken to the PACU in good condition.      Electronically signed by Ender Rangel MD on 10/6/2020 at 12:03 PM

## 2020-10-19 ENCOUNTER — OFFICE VISIT (OUTPATIENT)
Dept: ORTHOPEDIC SURGERY | Age: 65
End: 2020-10-19

## 2020-10-19 PROCEDURE — 99024 POSTOP FOLLOW-UP VISIT: CPT | Performed by: ORTHOPAEDIC SURGERY

## 2020-10-19 RX ORDER — METHYLPREDNISOLONE 4 MG/1
4 TABLET ORAL SEE ADMIN INSTRUCTIONS
Qty: 21 TABLET | Refills: 0 | Status: SHIPPED | OUTPATIENT
Start: 2020-10-19 | End: 2020-10-25

## 2020-10-19 NOTE — PROGRESS NOTES
Patient returns today status post left knee arthroscopy with partial (medial/lateral) meniscectomy. Patient has no major complaints other than expected tightness/swelling with ROM. Sharp/stabbing pain has improved. On exam, portal sites are without redness or drainage. No calf tenderness; negative Katina's sign. Motion is 0-100 degrees. She does have a mild effusion    Assessment  Status post left knee arthroscopy    Plan  Patient given exercises to perform. Patient given activities/ motions to complete. Continue activities at home. We will send a prescription for a Medrol Dosepak for her to help get her swelling. Continue the icing.   We will see her back in a as needed basis

## 2020-12-18 ENCOUNTER — OFFICE VISIT (OUTPATIENT)
Dept: ORTHOPEDIC SURGERY | Age: 65
End: 2020-12-18
Payer: MEDICARE

## 2020-12-18 PROCEDURE — 20610 DRAIN/INJ JOINT/BURSA W/O US: CPT | Performed by: ORTHOPAEDIC SURGERY

## 2020-12-18 PROCEDURE — 99024 POSTOP FOLLOW-UP VISIT: CPT | Performed by: ORTHOPAEDIC SURGERY

## 2020-12-18 RX ORDER — BUPIVACAINE HYDROCHLORIDE 5 MG/ML
2 INJECTION, SOLUTION PERINEURAL ONCE
Status: COMPLETED | OUTPATIENT
Start: 2020-12-18 | End: 2020-12-18

## 2020-12-18 RX ORDER — LIDOCAINE HYDROCHLORIDE 10 MG/ML
2 INJECTION, SOLUTION INFILTRATION; PERINEURAL ONCE
Status: COMPLETED | OUTPATIENT
Start: 2020-12-18 | End: 2020-12-18

## 2020-12-18 RX ORDER — BETAMETHASONE SODIUM PHOSPHATE AND BETAMETHASONE ACETATE 3; 3 MG/ML; MG/ML
12 INJECTION, SUSPENSION INTRA-ARTICULAR; INTRALESIONAL; INTRAMUSCULAR; SOFT TISSUE ONCE
Status: COMPLETED | OUTPATIENT
Start: 2020-12-18 | End: 2020-12-18

## 2020-12-18 RX ADMIN — BETAMETHASONE SODIUM PHOSPHATE AND BETAMETHASONE ACETATE 12 MG: 3; 3 INJECTION, SUSPENSION INTRA-ARTICULAR; INTRALESIONAL; INTRAMUSCULAR; SOFT TISSUE at 13:30

## 2020-12-18 RX ADMIN — BUPIVACAINE HYDROCHLORIDE 10 MG: 5 INJECTION, SOLUTION PERINEURAL at 13:30

## 2020-12-18 RX ADMIN — LIDOCAINE HYDROCHLORIDE 2 ML: 10 INJECTION, SOLUTION INFILTRATION; PERINEURAL at 13:31

## 2020-12-18 NOTE — PROGRESS NOTES
Karla Lazcano M.D.            [address] [facility] [address] [facility], [address] [facility]           Dept Phone: [phone]           Dept Fax:  [phone]           [place]          Dept Phone: [phone]           Dept Fax:  [phone]      Chief Compliant:  Chief Complaint   Patient presents with    Follow-up     left kne scope         History of Present Illness: This is a 77 y.o. female who presents to the clinic today for evaluation / follow up of left knee pain. Harinder Gama is a 47-year-old patient who had an arthroscopy of her left knee on October 6, 2020. She states that she still having increasing pain to her knee and she states her complaints her knee of being tight. She denies any recurrent sharp stabbing catching pain however. .       Review of Systems   Constitutional: Negative for fever, chills, sweats. Eyes: Negative for changes in vision, or pain. HENT: Negative for ear ache, epistaxis, or sore throat. Respiratory/Cardio: Negative for Chest pain, palpitations, SOB, or cough. Gastrointestinal: Negative for abdominal pain, N/V/D. Genitourinary: Negative for dysuria, frequency, urgency, or hematuria. Neurological: Negative for headache, numbness, or weakness. Integumentary: Negative for rash, itching, laceration, or abrasion. Musculoskeletal: Positive for Follow-up (left kne scope )       Physical Exam:  Constitutional: Patient is oriented to person, place, and time. Patient appears well-developed and well nourished. HENT: Negative otherwise noted  Head: Normocephalic and Atraumatic  Nose: Normal  Eyes: Conjunctivae and EOM are normal  Neck: Normal range of motion Neck supple. Respiratory/Cardio: Effort normal. No respiratory distress. Musculoskeletal: Examination patient's left knee notes an obvious effusion. Her motion is decreased with full extension only at about 100 degrees she has pain after this nothing on Andrez's no calf tenderness negative Homans  Neurological: Patient is alert and oriented to person, place, and time. Normal strenght. No sensory deficit. Skin: Skin is warm and dry  Psychiatric: Behavior is normal. Thought content normal.  Nursing note and vitals reviewed. Labs and Imaging:     XR taken today:  No results found. Orders Placed This Encounter   Procedures    IL ARTHROCENTESIS ASPIR&/INJ MAJOR JT/BURSA W/O US       Assessment and Plan:  1. Acute pain of left knee            This is a 77 y.o. female who presents to the clinic today for evaluation / follow up of postoperative effusion left knee status post arthroscopy.      Past History:    Current Outpatient Medications:     atenolol (TENORMIN) 25 MG tablet, TAKE 1 TABLET BY MOUTH TWICE DAILY, Disp: 180 tablet, Rfl: 0    Multiple Vitamins-Minerals (THERAPEUTIC MULTIVITAMIN-MINERALS) tablet, Take 1 tablet by mouth daily, Disp: , Rfl:     Turmeric 500 MG CAPS, Take by mouth daily, Disp: , Rfl:     ciprofloxacin-dexamethasone (CIPRODEX) 0.3-0.1 % otic suspension, Instill 1-2 drops into each ear as directed, Disp: 1 Bottle, Rfl: 2    lisinopril-hydroCHLOROthiazide (PRINZIDE;ZESTORETIC) 20-12.5 MG per tablet, take 1 tablet by mouth once daily, Disp: 90 tablet, Rfl: 0    ibuprofen (ADVIL;MOTRIN) 800 MG tablet, Take 1 tablet by mouth 2 times daily as needed for Pain, Disp: 60 tablet, Rfl: 5    famotidine (PEPCID) 20 MG tablet, Take 20 mg by mouth nightly as needed, Disp: , Rfl:     Cholecalciferol (VITAMIN D PO), Take 1 tablet by mouth daily, Disp: , Rfl:   Allergies   Allergen Reactions    Avelox [Moxifloxacin]     Biaxin [Clarithromycin] Hives    Epinephrine Other (See Comments)     Increased BP, Tachycardia  Neosporin [Neomycin-Polymyxin-Gramicidin]     Sulfa Antibiotics     Adhesive Tape Rash    Phenylephrine Rash     Social History     Socioeconomic History    Marital status:      Spouse name: Not on file    Number of children: Not on file    Years of education: Not on file    Highest education level: Not on file   Occupational History    Not on file   Social Needs    Financial resource strain: Not on file    Food insecurity     Worry: Not on file     Inability: Not on file    Transportation needs     Medical: Not on file     Non-medical: Not on file   Tobacco Use    Smoking status: Former Smoker     Packs/day: 1.00     Years: 20.00     Pack years: 20.00     Types: Cigarettes     Quit date: 9/15/1996     Years since quittin.2    Smokeless tobacco: Never Used   Substance and Sexual Activity    Alcohol use:  Yes     Alcohol/week: 0.0 standard drinks     Comment: occasional    Drug use: No    Sexual activity: Yes     Partners: Male     Birth control/protection: Surgical     Comment:  vasectomy   Lifestyle    Physical activity     Days per week: Not on file     Minutes per session: Not on file    Stress: Not on file   Relationships    Social connections     Talks on phone: Not on file     Gets together: Not on file     Attends Advent service: Not on file     Active member of club or organization: Not on file     Attends meetings of clubs or organizations: Not on file     Relationship status: Not on file    Intimate partner violence     Fear of current or ex partner: Not on file     Emotionally abused: Not on file     Physically abused: Not on file     Forced sexual activity: Not on file   Other Topics Concern    Not on file   Social History Narrative    Not on file     Past Medical History:   Diagnosis Date    Arthritis     Colon polyps 2017    [facility]     GERD (gastroesophageal reflux disease)     Heart palpitations  History of colon polyps 2002    Hyperlipidemia     Hyperplastic colon polyp 2016    Hypertension     Kidney infection 2004    Pneumothorax     spontaneous x 2, chest tube    Supraventricular tachycardia ([address]     first episode about 20 years ago, well controlled now with Atenolol     Past Surgical History:   Procedure Laterality Date    CHOLECYSTECTOMY, LAPAROSCOPIC  2010    COLONOSCOPY  9/2009    small hemorrhoids    COLONOSCOPY  7/2002    hemohhoids, hyperplastic polyp, right colon pathology-chronic inflammation in lumina propria with focal actue cryptitis consistent with active colitis    COLONOSCOPY  02/17/2016    flat sigmoid [id number]    COLONOSCOPY  11/18/2017    NO RECURRENT POLYPS SEEN AT THE PREVIOUS POLYPECTOMY SITE.  hOWEVER IN THE SIGMOID COLON AT ABOUT 50 CM FROM THE ANUS FLAT POLYP SEEN. , PATHOLOGY-- LARGE INTESTINAL TYPE MUCOSA WITH MIXED FEATURES OF     DILATION AND CURETTAGE      AUB    GYNECOLOGIC CRYOSURGERY  29's    KNEE ARTHROSCOPY Left 10/6/2020    KNEE ARTHROSCOPY PARTIAL MEDIAL MENISCECTOMY performed by Bindu Estrada MD at [facility] W/REMOVAL LESION BY  [facility] N/A 11/18/2017    COLONOSCOPY POLYPECTOMY HOT BIOPSY, COLD BIOPSY, APC SIGMOID, SPOT MARKING SIGMOID performed by Viral Paul MD at [address] [facility]  2003    hemorrhoids, no colitis seen    TONSILLECTOMY AND ADENOIDECTOMY      child      Family History   Problem Relation Age of Onset    Cancer Father 28        bone sarcoma    Hypertension Mother     Other Mother         peripheral neuropathy    Uterine Cancer Maternal Aunt     Breast Cancer Other         dx first before age 48 and again @ 79    Colon Cancer Maternal Aunt         all dx before age 48    Colon Cancer Maternal Uncle         1 uncle in 42's dx, other in 66's    Colon Cancer Other         multiple cousins dx before age 48   [facility] Uterine Cancer Paternal Aunt         dx after  Ovarian Cancer Paternal Aunt         dx after age 48    Lung Cancer Paternal Aunt         dx after 48    Colon Cancer Paternal Grandfather         dx after age 48    Colon Cancer Maternal Grandmother 55    Liver Cancer Maternal Grandmother     Diabetes Neg Hx     Eclampsia Neg Hx      Labor Neg Hx     Spont Abortions Neg Hx     Stroke Neg Hx     Depression Neg Hx    Plan  An informed verbal consent for the procedure was obtained and risks including, but not limited to: allergy to medications, injection, bleeding, stiffness of joint, recurrence of symptoms, loss of function, swelling, drainage, irrigation, need for surgery and pseudo-septic inflammation, were explained to the patient. Also, discussed was the potential for further injections, irrigation and debridement and surgery. Alternate means of treatment have also been discussed with the patient. Under sterile conditions the patient's left knee was injected with lidocaine 2 cc and bupivacaine 2 cc. She is then aspirated 22 cc of blood-tinged synovial fluid. She she was then injected with 2 cc Celestone. She tolerated procedure well. Patient was given instructions and precautions about her knee. Test.  Doing well with this back here in a as needed basis                      Provider Attestation:  Sara Bergman, personally performed the services described in this documentation. All medical record entries made by the scribe were at my direction and in my presence. I have reviewed the chart and discharge instructions and agree that the records reflect my personal performance and is accurate and complete. Kem Glass MD. 20      Please note that this chart was generated using voice recognition Dragon dictation software. Although every effort was made to ensure the accuracy of this automated transcription, some errors in transcription may have occurred.

## 2021-01-27 ENCOUNTER — OFFICE VISIT (OUTPATIENT)
Dept: ORTHOPEDIC SURGERY | Age: 66
End: 2021-01-27
Payer: MEDICARE

## 2021-01-27 VITALS — TEMPERATURE: 97.4 F

## 2021-01-27 DIAGNOSIS — Z98.890 S/P LEFT KNEE ARTHROSCOPY: Primary | ICD-10-CM

## 2021-01-27 DIAGNOSIS — M17.12 PRIMARY OSTEOARTHRITIS OF LEFT KNEE: ICD-10-CM

## 2021-01-27 PROCEDURE — 1036F TOBACCO NON-USER: CPT | Performed by: PHYSICIAN ASSISTANT

## 2021-01-27 PROCEDURE — 1123F ACP DISCUSS/DSCN MKR DOCD: CPT | Performed by: PHYSICIAN ASSISTANT

## 2021-01-27 PROCEDURE — 99213 OFFICE O/P EST LOW 20 MIN: CPT | Performed by: PHYSICIAN ASSISTANT

## 2021-01-27 PROCEDURE — 3017F COLORECTAL CA SCREEN DOC REV: CPT | Performed by: PHYSICIAN ASSISTANT

## 2021-01-27 PROCEDURE — G8427 DOCREV CUR MEDS BY ELIG CLIN: HCPCS | Performed by: PHYSICIAN ASSISTANT

## 2021-01-27 PROCEDURE — G8417 CALC BMI ABV UP PARAM F/U: HCPCS | Performed by: PHYSICIAN ASSISTANT

## 2021-01-27 PROCEDURE — 4040F PNEUMOC VAC/ADMIN/RCVD: CPT | Performed by: PHYSICIAN ASSISTANT

## 2021-01-27 PROCEDURE — 1090F PRES/ABSN URINE INCON ASSESS: CPT | Performed by: PHYSICIAN ASSISTANT

## 2021-01-27 PROCEDURE — 20610 DRAIN/INJ JOINT/BURSA W/O US: CPT | Performed by: PHYSICIAN ASSISTANT

## 2021-01-27 PROCEDURE — G8399 PT W/DXA RESULTS DOCUMENT: HCPCS | Performed by: PHYSICIAN ASSISTANT

## 2021-01-27 PROCEDURE — G8484 FLU IMMUNIZE NO ADMIN: HCPCS | Performed by: PHYSICIAN ASSISTANT

## 2021-01-27 RX ORDER — MELOXICAM 15 MG/1
15 TABLET ORAL DAILY
Qty: 30 TABLET | Refills: 3 | Status: SHIPPED | OUTPATIENT
Start: 2021-01-27 | End: 2021-07-02

## 2021-01-27 RX ORDER — LIDOCAINE HYDROCHLORIDE 10 MG/ML
4 INJECTION, SOLUTION INFILTRATION; PERINEURAL ONCE
Status: COMPLETED | OUTPATIENT
Start: 2021-01-27 | End: 2021-01-27

## 2021-01-27 RX ADMIN — LIDOCAINE HYDROCHLORIDE 4 ML: 10 INJECTION, SOLUTION INFILTRATION; PERINEURAL at 12:07

## 2021-01-27 NOTE — PROGRESS NOTES
[address] [facility], [address] [place], [address] [place] [address], [address] [facility]           Dept Phone: [phone]           Dept Fax:  [phone] [address] [facility] [place]          Dept Phone: [phone]           Dept Fax:  [phone]      Chief Compliant:  Chief Complaint   Patient presents with    Follow-up     left knee        History of Present Illness:  Stephanie Simmons returns today. This is a 77 y.o. female who presents to the clinic today for follow up of left knee pain. Patient with history of left knee arthroscopy partial medial meniscectomy in October 6th 2021. Patient continued to have pain and swelling and was evaluated by Dr. Molly Ordonez on 12/18/2020 underwent an aspiration as well as cortisone injection. Patient states she had significantly of pain for about a week however pain gradually returned after that has been quite bothersome over the last few weeks. Patient notes she has had significant swelling over the last 2 to 3 weeks as well associated with tightness of the left knee. She denies any new injury or trauma just this continued pain. She also notes quite a bit of weakness in this left leg due to the pain. Patient is currently in physical therapy for her back and was hoping to get in order to add her left knee strengthening to this as well. Patient is currently taking ibuprofen daily and does note mild temporary relief with this. She is not taking any other medication. Review of Systems   Constitutional: Negative for fever, chills, sweats, recent illness, or recent injury. Neurological: Negative for headaches, numbness, or weakness. Integumentary: Negative for rash, itching, ecchymosis, abrasions, or laceration.    Musculoskeletal: Positive for Follow-up (left knee)       Physical Exam:  Constitutional: Patient is oriented to person, place, and time. Patient appears well-developed and well nourished. Musculoskeletal:    Left Knee:     Skin: warm and dry, no rash or erythema  Vasculature: 2+ pedal pulses bilaterally  Neuro: Sensation grossly intact to light touch diffusely  Alignment: Genu varum  Tenderness: Tenderness to the medial joint line. Mild tenderness to the quadriceps tendon. Effusion: Moderate effusion present    ROM: (Degrees)       A P       Extension  5 5       Flexion   110 120       Crepitation  Yes       Muscle strength:         Flexion   5      Extension  5      SLR   5        Extensor lag   y          Special testing:  y    Pain with deep knee flexion     n    Patellar grind       n    Patellar apprehension      n    Patellar glide         nn    Lachman       n    Anterior drawer      n    Pivot shift       n    Posterior drawer      n    Dial test       n    Posterolateral drawer      n    Posterior Sag       n    MCL        n    LCL          y    Medial joint line tenderness     n    Lateral joint line tenderness     n    Appley's         Neurological: Patient is alert and oriented to person, place, and time. Normal strenght. No sensory deficit. Skin: Skin is warm and dry  Psychiatric: Behavior is normal. Thought content normal.  Nursing note and vitals reviewed. Labs and Imaging:     XR taken today:  No results found. X-rays taken in clinic and preliminarily reviewed by me:  AP bilateral knees and lateral view of the left knee taken in clinic today and compared with those taken on 9/23/2020 Left knee with severe medial compartmental narrowing that appears to have mild progression compared with those taken on 9/21/2020. She does appear to be approaching bone-on-bone apposition in the medial compartment. Moderate patellofemoral degenerative changes present. Right knee with mild to moderate medial compartmental narrowing seen on AP view. No evidence of fracture or dislocation present.     Assessment and Plan:  1. S/P left knee arthroscopy              PLAN:  This is a 77 y.o. female who presents to the clinic today for follow up continued left knee pain. Patient is status post 10/6/2020 left knee arthroscopy partial medial meniscectomy. There does appear to be mild progression of degenerative changes compared to preoperatively albeit nothing extraordinary. 1.  At this time I believe patient would benefit from initiating a prescription strength NSAID i.e. meloxicam which is electronically sent to her pharmacy. 2.  Referral to physical therapy is made for strengthening of this left knee. 3.  Due to the discomfort and associated fusion patient elected to pursue an aspiration today which was done without and Celestone injection as this was given in December. This is done as outlined below. 4.  Recommend follow-up in 2 months for reevaluation however patient may call or return sooner for any questions or concerns    Procedure Note: Left knee aspiration without Injection   An informed verbal consent for the procedure was obtained and risks including, but not limited to: allergy to medications, injection, bleeding, stiffness of joint, recurrence of symptoms, loss of function, swelling, drainage, irrigation, need for surgery and pseudo-septic inflammation, were explained to the patient. Also, discussed was the potential for further injections, irrigation and debridement and surgery. Alternate means of treatment have also been discussed with the patient. Administrations This Visit     lidocaine 1 % injection 4 mL     Admin Date  01/27/2021  12:07 Action  Given Dose  4 mL Route  Intra-articular Site  Knee Left Administered By  Tariq Gonzalez LPN    Ordering Provider: Karron Crigler, PA    [address]: [id number]    Lot#: [id number]    : [id number] [facility]    Patient Supplied?: No                Following an appropriate discussion with the patient regarding the risks and benefits of the procedure she consented to proceed. her left knee was prepped using betadine solution and alcohol swab. Using aseptic technique and through a superolateral approach, her left knee was injected superficially with 4 cc of 1% lidocaine without epinephrine and subsequently with an 18-gauge needle approximately 12 cc of blood-tinged, nonpurulent synovial fluid is aspirated from the knee joint. .A band aid was applied to the injection site. she tolerated the injection with no immediate adverse reactions. Please note that this chart was generated using voice recognition Dragon dictation software. Although every effort was made to ensure the accuracy of this automated transcription, some errors in transcription may have occurred.

## 2021-03-10 ENCOUNTER — OFFICE VISIT (OUTPATIENT)
Dept: ORTHOPEDIC SURGERY | Age: 66
End: 2021-03-10
Payer: MEDICARE

## 2021-03-10 DIAGNOSIS — G89.29 CHRONIC PAIN OF LEFT KNEE: Primary | ICD-10-CM

## 2021-03-10 DIAGNOSIS — M25.562 CHRONIC PAIN OF LEFT KNEE: Primary | ICD-10-CM

## 2021-03-10 PROCEDURE — 99213 OFFICE O/P EST LOW 20 MIN: CPT | Performed by: ORTHOPAEDIC SURGERY

## 2021-03-10 PROCEDURE — 4040F PNEUMOC VAC/ADMIN/RCVD: CPT | Performed by: ORTHOPAEDIC SURGERY

## 2021-03-10 PROCEDURE — G8484 FLU IMMUNIZE NO ADMIN: HCPCS | Performed by: ORTHOPAEDIC SURGERY

## 2021-03-10 PROCEDURE — 1123F ACP DISCUSS/DSCN MKR DOCD: CPT | Performed by: ORTHOPAEDIC SURGERY

## 2021-03-10 PROCEDURE — G8417 CALC BMI ABV UP PARAM F/U: HCPCS | Performed by: ORTHOPAEDIC SURGERY

## 2021-03-10 PROCEDURE — 20610 DRAIN/INJ JOINT/BURSA W/O US: CPT | Performed by: ORTHOPAEDIC SURGERY

## 2021-03-10 PROCEDURE — 1090F PRES/ABSN URINE INCON ASSESS: CPT | Performed by: ORTHOPAEDIC SURGERY

## 2021-03-10 PROCEDURE — G8399 PT W/DXA RESULTS DOCUMENT: HCPCS | Performed by: ORTHOPAEDIC SURGERY

## 2021-03-10 PROCEDURE — 1036F TOBACCO NON-USER: CPT | Performed by: ORTHOPAEDIC SURGERY

## 2021-03-10 PROCEDURE — 3017F COLORECTAL CA SCREEN DOC REV: CPT | Performed by: ORTHOPAEDIC SURGERY

## 2021-03-10 PROCEDURE — G8428 CUR MEDS NOT DOCUMENT: HCPCS | Performed by: ORTHOPAEDIC SURGERY

## 2021-03-10 RX ORDER — BUPIVACAINE HYDROCHLORIDE 5 MG/ML
2 INJECTION, SOLUTION PERINEURAL ONCE
Status: COMPLETED | OUTPATIENT
Start: 2021-03-10 | End: 2021-03-10

## 2021-03-10 RX ORDER — LIDOCAINE HYDROCHLORIDE 10 MG/ML
2 INJECTION, SOLUTION INFILTRATION; PERINEURAL ONCE
Status: COMPLETED | OUTPATIENT
Start: 2021-03-10 | End: 2021-03-10

## 2021-03-10 RX ORDER — BETAMETHASONE SODIUM PHOSPHATE AND BETAMETHASONE ACETATE 3; 3 MG/ML; MG/ML
12 INJECTION, SUSPENSION INTRA-ARTICULAR; INTRALESIONAL; INTRAMUSCULAR; SOFT TISSUE ONCE
Status: COMPLETED | OUTPATIENT
Start: 2021-03-10 | End: 2021-03-10

## 2021-03-10 RX ADMIN — LIDOCAINE HYDROCHLORIDE 2 ML: 10 INJECTION, SOLUTION INFILTRATION; PERINEURAL at 15:28

## 2021-03-10 RX ADMIN — BETAMETHASONE SODIUM PHOSPHATE AND BETAMETHASONE ACETATE 12 MG: 3; 3 INJECTION, SUSPENSION INTRA-ARTICULAR; INTRALESIONAL; INTRAMUSCULAR; SOFT TISSUE at 15:27

## 2021-03-10 RX ADMIN — BUPIVACAINE HYDROCHLORIDE 10 MG: 5 INJECTION, SOLUTION PERINEURAL at 15:27

## 2021-03-10 NOTE — PROGRESS NOTES
nourished. HENT: Negative otherwise noted  Head: Normocephalic and Atraumatic  Nose: Normal  Eyes: Conjunctivae and EOM are normal  Neck: Normal range of motion Neck supple. Respiratory/Cardio: Effort normal. No respiratory distress. Musculoskeletal: Lamination of the left knee notes a modest effusion. Her knee motion is 5 to but 125 degrees. She has pretty significant pain medially and but nothing on Andrez's ligamentously is grossly intact some moderate patellofemoral pain as well no calf tenderness negative Homans. Neurological: Patient is alert and oriented to person, place, and time. Normal strenght. No sensory deficit. Skin: Skin is warm and dry  Psychiatric: Behavior is normal. Thought content normal.  Nursing note and vitals reviewed. Labs and Imaging:     XR taken today:  No results found. No orders of the defined types were placed in this encounter. Assessment and Plan:  1. Chronic pain of left knee    2. Post arthroscopic partial medial vasectomy left knee  3. 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 female who presents to the clinic today for evaluation / follow up of DJD left knee.      Past History:    Current Outpatient Medications:     atenolol (TENORMIN) 25 MG tablet, TAKE 1 TABLET BY MOUTH TWICE DAILY, Disp: 180 tablet, Rfl: 1    lisinopril-hydroCHLOROthiazide (PRINZIDE;ZESTORETIC) 20-12.5 MG per tablet, take 1 tablet by mouth once daily, Disp: 90 tablet, Rfl: 0    ibuprofen (ADVIL;MOTRIN) 800 MG tablet, Take 1 tablet by mouth 2 times daily as needed for Pain, Disp: 60 tablet, Rfl: 5    meloxicam (MOBIC) 15 MG tablet, Take 1 tablet by mouth daily (Patient not taking: Reported on 2/15/2021), Disp: 30 tablet, Rfl: 3    Multiple Vitamins-Minerals (THERAPEUTIC MULTIVITAMIN-MINERALS) tablet, Take 1 tablet by mouth daily, Disp: , Rfl:     Turmeric 500 MG CAPS, Take by mouth daily, Disp: , Rfl:   ciprofloxacin-dexamethasone (CIPRODEX) 0.3-0.1 % otic suspension, Instill 1-2 drops into each ear as directed, Disp: 1 Bottle, Rfl: 2    famotidine (PEPCID) 20 MG tablet, Take 20 mg by mouth nightly as needed, Disp: , Rfl:     Cholecalciferol (VITAMIN D PO), Take 1 tablet by mouth daily, Disp: , Rfl:   Allergies   Allergen Reactions    Avelox [Moxifloxacin]     Biaxin [Clarithromycin] Hives    Epinephrine Other (See Comments)     Increased BP, Tachycardia    Neosporin [Neomycin-Polymyxin-Gramicidin]     Sulfa Antibiotics     Adhesive Tape Rash    Phenylephrine Rash     Social History     Socioeconomic History    Marital status:      Spouse name: Patrici Schirmer Number of children: 2    Years of education: Not on file    Highest education level: Associate degree: occupational, technical, or vocational program   Occupational History    Not on file   Social Needs    Financial resource strain: Not hard at all   [place] insecurity     Worry: Never true     Inability: Never true   [place] needs     Medical: No     Non-medical: No   Tobacco Use    Smoking status: Former Smoker     Packs/day: 1.00     Years: 20.00     Pack years: 20.00     Types: Cigarettes     Quit date: 9/15/1996     Years since quittin.4    Smokeless tobacco: Never Used   Substance and Sexual Activity    Alcohol use: Yes     Alcohol/week: 0.0 standard drinks     Comment: occasional    Drug use: No    Sexual activity: Yes     Partners: Male     Birth control/protection: Surgical     Comment:  vasectomy   Lifestyle    Physical activity     Days per week: 5 days     Minutes per session: 50 min    Stress: Only a little   Relationships    Social connections     Talks on phone: More than three times a week     Gets together:  Three times a week     Attends Hindu service: Never     Active member of club or organization: No     Attends meetings of clubs or organizations: Not on file     Relationship status: bone sarcoma    Hypertension Mother    [facility] Other Mother         peripheral neuropathy    Uterine Cancer Maternal Aunt     Breast Cancer Other         dx first before age 48 and again @ 79    Colon Cancer Maternal Aunt         all dx before age 48    Colon Cancer Maternal Uncle         1 uncle in 42's dx, other in 68's    Colon Cancer Other         multiple cousins dx before age 48   [facility] Uterine Cancer Paternal Aunt         dx after    Ovarian Cancer Paternal Aunt         dx after age 48   [facility] Lung Cancer Paternal Aunt         dx after 48    Colon Cancer Paternal Grandfather         dx after age 48    Colon Cancer Maternal Grandmother 55    Liver Cancer Maternal Grandmother     Diabetes Neg Hx     Eclampsia Neg Hx      Labor Neg Hx     Spont Abortions Neg Hx     Stroke Neg Hx     Depression Neg Hx    Plan  An informed verbal consent for the procedure was obtained and risks including, but not limited to: allergy to medications, injection, bleeding, stiffness of joint, recurrence of symptoms, loss of function, swelling, drainage, irrigation, need for surgery and pseudo-septic inflammation, were explained to the patient. Also, discussed was the potential for further injections, irrigation and debridement and surgery. Alternate means of treatment have also been discussed with the patient.       Administrations This Visit     betamethasone acetate-betamethasone sodium phosphate (CELESTONE) injection 12 mg     Admin Date  03/10/2021  15:27 Action  Given Dose  12 mg Route  Intra-articular Site  Knee Left Administered By  Antoinette Oliveros LPN    Ordering Provider: Angeles Hill MD    NDC: 8345-7104-37    Lot#: [id number]    : [id number] [facility]    Patient Supplied?: No          bupivacaine (MARCAINE) 0.5 % injection 10 mg     Admin Date  03/10/2021  15:27 Action  Given Dose  10 mg Route  Intra-articular Site  Knee Left Administered By  Antoinette Oliveros LPN    Ordering Provider: Angeles Hill MD [address]: [id number]    Lot#: [id number]    : [facility]    Patient Supplied?: No          lidocaine 1 % injection 2 mL     Admin Date  03/10/2021  15:28 Action  Given Dose  2 mL Route  Intra-articular Site  Knee Left Administered By  Karen Daley LPN    Ordering Provider: Corina Martinez MD    NDC: 7367-0886-31    Lot#: [id number]    : Blenda Albe    Patient Supplied?: No            Under sterile conditions the patient's left knee was injected with lidocaine 2 cc bupivacaine 2 cc and Celestone 2 cc. She tolerated procedure well    I did discuss with the patient that she is likely looking at a total knee arthroplasty and she does agree with this. She would like to wait until the summertime but she does take care of her grandkids during the school year. We will see her back here in mid May for reevaluation and treatment                    Provider Attestation:  Suki All, personally performed the services described in this documentation. All medical record entries made by the scribe were at my direction and in my presence. I have reviewed the chart and discharge instructions and agree that the records reflect my personal performance and is accurate and complete. Corina Martinez MD. 03/10/21      Please note that this chart was generated using voice recognition Dragon dictation software. Although every effort was made to ensure the accuracy of this automated transcription, some errors in transcription may have occurred.

## 2021-04-10 ENCOUNTER — HOSPITAL ENCOUNTER (OUTPATIENT)
Dept: WOMENS IMAGING | Age: 66
Discharge: HOME | End: 2021-04-12
Payer: MEDICARE

## 2021-04-10 DIAGNOSIS — Z12.31 SCREENING MAMMOGRAM, ENCOUNTER FOR: ICD-10-CM

## 2021-04-10 PROCEDURE — 77063 BREAST TOMOSYNTHESIS BI: CPT

## 2021-06-21 ENCOUNTER — OFFICE VISIT (OUTPATIENT)
Dept: ORTHOPEDIC SURGERY | Age: 66
End: 2021-06-21
Payer: MEDICARE

## 2021-06-21 DIAGNOSIS — M25.562 CHRONIC PAIN OF LEFT KNEE: Primary | ICD-10-CM

## 2021-06-21 DIAGNOSIS — G89.29 CHRONIC PAIN OF LEFT KNEE: Primary | ICD-10-CM

## 2021-06-21 PROCEDURE — 1090F PRES/ABSN URINE INCON ASSESS: CPT | Performed by: ORTHOPAEDIC SURGERY

## 2021-06-21 PROCEDURE — G8399 PT W/DXA RESULTS DOCUMENT: HCPCS | Performed by: ORTHOPAEDIC SURGERY

## 2021-06-21 PROCEDURE — G8417 CALC BMI ABV UP PARAM F/U: HCPCS | Performed by: ORTHOPAEDIC SURGERY

## 2021-06-21 PROCEDURE — 99213 OFFICE O/P EST LOW 20 MIN: CPT | Performed by: ORTHOPAEDIC SURGERY

## 2021-06-21 PROCEDURE — 1123F ACP DISCUSS/DSCN MKR DOCD: CPT | Performed by: ORTHOPAEDIC SURGERY

## 2021-06-21 PROCEDURE — 4040F PNEUMOC VAC/ADMIN/RCVD: CPT | Performed by: ORTHOPAEDIC SURGERY

## 2021-06-21 PROCEDURE — 1036F TOBACCO NON-USER: CPT | Performed by: ORTHOPAEDIC SURGERY

## 2021-06-21 PROCEDURE — 3017F COLORECTAL CA SCREEN DOC REV: CPT | Performed by: ORTHOPAEDIC SURGERY

## 2021-06-21 PROCEDURE — G8428 CUR MEDS NOT DOCUMENT: HCPCS | Performed by: ORTHOPAEDIC SURGERY

## 2021-06-21 NOTE — PROGRESS NOTES
Tawanda Valenzuela M.D.            [facility] [address], [address] [facility]           Dept Phone: [phone]           Dept Fax:  [phone]           Rj Arredondo          Dept Phone: [phone]           Dept Fax:  [phone]      Chief Compliant:  Chief Complaint   Patient presents with    Pain     Lt knee pain ( APMM 10/6/20 )        History of Present Illness: This is a 77 y.o. female who presents to the clinic today for evaluation / follow up of knee pain. Please see all prior dictations. Matt Jeff is a 47-year-old female who was had a previous arthroscopy of her left knee. 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 She has had several injections in the most recently during my absence in April none of which is really given her much relief. She is at the point where her pain is progressive she has lots of crepitus and grinding. She can no longer do her walks. Daily activities become significantly restricted. She is now wishing to proceed with total knee arthroplasty. Review of Systems   Constitutional: Negative for fever, chills, sweats. Eyes: Negative for changes in vision, or pain. HENT: Negative for ear ache, epistaxis, or sore throat. Respiratory/Cardio: Negative for Chest pain, palpitations, SOB, or cough. Gastrointestinal: Negative for abdominal pain, N/V/D. Genitourinary: Negative for dysuria, frequency, urgency, or hematuria. Neurological: Negative for headache, numbness, or weakness. Integumentary: Negative for rash, itching, laceration, or abrasion. Musculoskeletal: Positive for Pain (Lt knee pain ( APMM 10/6/20 ))       Physical Exam:  Constitutional: Patient is oriented to person, place, and time.  Patient appears well-developed and well nourished. HENT: Negative otherwise noted  Head: Normocephalic and Atraumatic  Nose: Normal  Eyes: Conjunctivae and EOM are normal  Neck: Normal range of motion Neck supple. Respiratory/Cardio: Effort normal. No respiratory distress. Musculoskeletal: Physical examination notes she is developed about a four 5 degree flexion traction. She does have significant grinding over the medial compartment and with significant pain she flexes approximate 130 degrees. She does have some patellofemoral pain with grind as well she is grossly ligamentously intact both cruciates as well as collaterals. She has no hip rotational pain no calf tenderness negative Homans no trochanteric findings  Neurological: Patient is alert and oriented to person, place, and time. Normal strenght. No sensory deficit. Skin: Skin is warm and dry  Psychiatric: Behavior is normal. Thought content normal.  Nursing note and vitals reviewed. Labs and Imaging:     XR taken taken most recently January 27, 2021 show basically severe medial compartment joint space narrowing with pretty significant progression compared to those taken on 9/21/2020. She has essentially bone-on-bone apposition she also has some moderate patellofemoral degenerative changes as well. No acute changes noted. No orders of the defined types were placed in this encounter. Assessment and Plan:  1. Chronic pain of left knee            This is a 77 y.o. 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      Past History:    Current Outpatient Medications:     atenolol (TENORMIN) 25 MG tablet, TAKE 1 TABLET BY MOUTH TWICE DAILY, Disp: 60 tablet, Rfl: 0    lisinopril-hydroCHLOROthiazide (PRINZIDE;ZESTORETIC) 20-12.5 MG per tablet, take 1 tablet by mouth once daily, Disp: 30 tablet, Rfl: 0    ibuprofen (ADVIL;MOTRIN) 800 MG tablet, Take 1 tablet by mouth 2 times daily as needed for Pain, Disp: 60 tablet, Rfl: 5    meloxicam (MOBIC) 15 MG tablet, Take 1 tablet by mouth daily (Patient not taking: Reported on 2/15/2021), Disp: 30 tablet, Rfl: 3    Multiple Vitamins-Minerals (THERAPEUTIC MULTIVITAMIN-MINERALS) tablet, Take 1 tablet by mouth daily, Disp: , Rfl:     Turmeric 500 MG CAPS, Take by mouth daily, Disp: , Rfl:     ciprofloxacin-dexamethasone (CIPRODEX) 0.3-0.1 % otic suspension, Instill 1-2 drops into each ear as directed, Disp: 1 Bottle, Rfl: 2    famotidine (PEPCID) 20 MG tablet, Take 20 mg by mouth nightly as needed, Disp: , Rfl:     Cholecalciferol (VITAMIN D PO), Take 1 tablet by mouth daily, Disp: , Rfl:   Allergies   Allergen Reactions    Avelox [Moxifloxacin]     Biaxin [Clarithromycin] Hives    Epinephrine Other (See Comments)     Increased BP, Tachycardia    Neosporin [Neomycin-Polymyxin-Gramicidin]     Sulfa Antibiotics     Adhesive Tape Rash    Phenylephrine Rash     Social History     Socioeconomic History    Marital status:      Spouse name: Marian Melchor Number of children: 2    Years of education: Not on file    Highest education level: Associate degree: occupational, technical, or vocational program   Occupational History    Not on file   Tobacco Use    Smoking status: Former Smoker     Packs/day: 1.00     Years: 20.00     Pack years: 20.00     Types: Cigarettes     Quit date: 9/15/1996     Years since quittin.7    Smokeless tobacco: Never Used   Vaping Use    Vaping Use: Never used   Substance and Sexual Activity    Alcohol use:  Yes     Alcohol/week: 0.0 standard drinks     Comment: occasional    Drug use: No    Sexual activity: Yes     Partners: Male     Birth control/protection: Surgical     Comment:  vasectomy   Other Topics Concern    Not on file   Social History Narrative    Not on file     Social Determinants of Health     Financial Resource Strain: Low Risk     Difficulty of Paying Living Expenses: Not hard at all   Food Insecurity: No Food Insecurity    Worried About Running Out of Food in the Last Year: Never true    Ran Out of Food in the Last Year: Never true   Transportation Needs: No Transportation Needs    Lack of Transportation (Medical): No    Lack of Transportation (Non-Medical): No   Physical Activity: Sufficiently Active    Days of Exercise per Week: 5 days    Minutes of Exercise per Session: 50 min   Stress: No Stress Concern Present    Feeling of Stress : Only a little   Social Connections: Moderately Isolated    Frequency of Communication with Friends and Family: More than three times a week    Frequency of Social Gatherings with Friends and Family:  Three times a week    Attends Episcopalian Services: Never    Active Member of Clubs or Organizations: No    Attends Club or Organization Meetings: Not asked    Marital Status:    Intimate Partner Violence: Not At Risk    Fear of Current or Ex-Partner: No    Emotionally Abused: No    Physically Abused: No    Sexually Abused: No     Past Medical History:   Diagnosis Date    Arthritis     Colon polyps 11/18/2017    COPD (chronic obstructive pulmonary disease) (HCC)     GERD (gastroesophageal reflux disease)     Heart palpitations     History of colon polyps 2002    Hyperlipidemia     Hyperplastic colon polyp 2016    Hypertension     Kidney infection 2004    Pneumothorax     spontaneous x 2, chest tube    Supraventricular tachycardia ([address]     first episode about 20 years ago, well controlled now with Atenolol     Past Surgical History:   Procedure Laterality Date    CHOLECYSTECTOMY, LAPAROSCOPIC  2010    COLONOSCOPY  9/2009    small hemorrhoids    COLONOSCOPY  7/2002    hemohhoids, hyperplastic polyp, right colon pathology-chronic inflammation in lumina propria with focal actue cryptitis consistent with active colitis    COLONOSCOPY  02/17/2016    flat sigmoid [id number]    COLONOSCOPY  2017    NO RECURRENT POLYPS SEEN AT THE PREVIOUS POLYPECTOMY SITE.  hOWEVER IN THE SIGMOID COLON AT ABOUT 50 CM FROM THE ANUS FLAT POLYP SEEN. , PATHOLOGY-- LARGE INTESTINAL TYPE MUCOSA WITH MIXED FEATURES OF     DILATION AND CURETTAGE      AUB    GYNECOLOGIC CRYOSURGERY  29's    KNEE ARTHROSCOPY Left 10/6/2020    KNEE ARTHROSCOPY PARTIAL MEDIAL MENISCECTOMY performed by Tio Chan MD at [address] [facility] FLX W/REMOVAL LESION BY  [address] N/A 2017    COLONOSCOPY POLYPECTOMY HOT BIOPSY, COLD BIOPSY, APC SIGMOID, SPOT MARKING SIGMOID performed by Kathya Keller MD at [facility]      hemorrhoids, no colitis seen    TONSILLECTOMY AND ADENOIDECTOMY      child      Family History   Problem Relation Age of Onset    Cancer Father 28        bone sarcoma    Hypertension Mother    Emilia Rivera Other Mother         peripheral neuropathy    Uterine Cancer Maternal Aunt     Breast Cancer Other         dx first before age 48 and again @ 79    Colon Cancer Maternal Aunt         all dx before age 48    Colon Cancer Maternal Uncle         1 uncle in 42's dx, other in 68's    Colon Cancer Other         multiple cousins dx before age 48   Emilia Miguel Uterine Cancer Paternal Aunt         dx after    Ovarian Cancer Paternal Aunt         dx after age 48   Emiliaerik Rivera Lung Cancer Paternal Aunt         dx after 48    Colon Cancer Paternal Grandfather         dx after age 48    Colon Cancer Maternal Grandmother 55    Liver Cancer Maternal Grandmother     Diabetes Neg Hx     Eclampsia Neg Hx      Labor Neg Hx     Spont Abortions Neg Hx     Stroke Neg Hx     Depression Neg Hx    Plan  Patient is a suffering significantly and she wishes to proceed with total knee arthroplasty. She was made aware of all risk and benefits. She has no major medical comorbidities.   Dr. Janene Ross for medical clearance      Provider Attestation:  Cj Remy, personally performed the services described in this documentation. All medical record entries made by the scribe were at my direction and in my presence. I have reviewed the chart and discharge instructions and agree that the records reflect my personal performance and is accurate and complete. Randi Sierra MD. 06/21/21      Please note that this chart was generated using voice recognition Dragon dictation software. Although every effort was made to ensure the accuracy of this automated transcription, some errors in transcription may have occurred.

## 2021-07-02 ENCOUNTER — HOSPITAL ENCOUNTER (OUTPATIENT)
Dept: PREADMISSION TESTING | Age: 66
Discharge: HOME | End: 2021-07-06
Payer: MEDICARE

## 2021-07-02 VITALS
OXYGEN SATURATION: 99 % | RESPIRATION RATE: 16 BRPM | BODY MASS INDEX: 30.61 KG/M2 | HEART RATE: 71 BPM | HEIGHT: 67 IN | TEMPERATURE: 97.8 F | DIASTOLIC BLOOD PRESSURE: 97 MMHG | WEIGHT: 195 LBS | SYSTOLIC BLOOD PRESSURE: 146 MMHG

## 2021-07-02 LAB
-: ABNORMAL
ABSOLUTE EOS #: 0.2 K/UL (ref 0–0.4)
ABSOLUTE IMMATURE GRANULOCYTE: ABNORMAL K/UL (ref 0–0.3)
ABSOLUTE LYMPH #: 1.5 K/UL (ref 1–4.8)
ABSOLUTE MONO #: 0.5 K/UL (ref 0.1–1.3)
AMORPHOUS: ABNORMAL
ANION GAP SERPL CALCULATED.3IONS-SCNC: 12 MMOL/L (ref 9–17)
BACTERIA: ABNORMAL
BASOPHILS # BLD: 1 % (ref 0–2)
BASOPHILS ABSOLUTE: 0.1 K/UL (ref 0–0.2)
BILIRUBIN URINE: NEGATIVE
BUN BLDV-MCNC: 24 MG/DL (ref 8–23)
BUN/CREAT BLD: ABNORMAL (ref 9–20)
CALCIUM SERPL-MCNC: 8.9 MG/DL (ref 8.6–10.4)
CASTS UA: ABNORMAL /LPF
CHLORIDE BLD-SCNC: 100 MMOL/L (ref 98–107)
CO2: 27 MMOL/L (ref 20–31)
COLOR: YELLOW
COMMENT UA: ABNORMAL
CREAT SERPL-MCNC: 0.81 MG/DL (ref 0.5–0.9)
CRYSTALS, UA: ABNORMAL /HPF
DIFFERENTIAL TYPE: ABNORMAL
EOSINOPHILS RELATIVE PERCENT: 3 % (ref 0–4)
EPITHELIAL CELLS UA: ABNORMAL /HPF
GFR AFRICAN AMERICAN: >60 ML/MIN
GFR NON-AFRICAN AMERICAN: >60 ML/MIN
GFR SERPL CREATININE-BSD FRML MDRD: ABNORMAL ML/MIN/{1.73_M2}
GFR SERPL CREATININE-BSD FRML MDRD: ABNORMAL ML/MIN/{1.73_M2}
GLUCOSE BLD-MCNC: 94 MG/DL (ref 70–99)
GLUCOSE URINE: NEGATIVE
HCT VFR BLD CALC: 41.9 % (ref 36–46)
HEMOGLOBIN: 13.6 G/DL (ref 12–16)
IMMATURE GRANULOCYTES: ABNORMAL %
KETONES, URINE: NEGATIVE
LEUKOCYTE ESTERASE, URINE: NEGATIVE
LYMPHOCYTES # BLD: 22 % (ref 24–44)
MCH RBC QN AUTO: 31 PG (ref 26–34)
MCHC RBC AUTO-ENTMCNC: 32.6 G/DL (ref 31–37)
MCV RBC AUTO: 95.2 FL (ref 80–100)
MONOCYTES # BLD: 7 % (ref 1–7)
MUCUS: ABNORMAL
NITRITE, URINE: NEGATIVE
NRBC AUTOMATED: ABNORMAL PER 100 WBC
OTHER OBSERVATIONS UA: ABNORMAL
PDW BLD-RTO: 12.7 % (ref 11.5–14.9)
PH UA: 5 (ref 5–8)
PLATELET # BLD: 250 K/UL (ref 150–450)
PLATELET ESTIMATE: ABNORMAL
PMV BLD AUTO: 9.7 FL (ref 6–12)
POTASSIUM SERPL-SCNC: 4.7 MMOL/L (ref 3.7–5.3)
PROTEIN UA: NEGATIVE
RBC # BLD: 4.4 M/UL (ref 4–5.2)
RBC # BLD: ABNORMAL 10*6/UL
RBC UA: ABNORMAL /HPF
RENAL EPITHELIAL, UA: ABNORMAL /HPF
SEG NEUTROPHILS: 67 % (ref 36–66)
SEGMENTED NEUTROPHILS ABSOLUTE COUNT: 4.5 K/UL (ref 1.3–9.1)
SODIUM BLD-SCNC: 139 MMOL/L (ref 135–144)
SPECIFIC GRAVITY UA: 1.02 (ref 1–1.03)
TRICHOMONAS: ABNORMAL
TURBIDITY: ABNORMAL
URINE HGB: NEGATIVE
UROBILINOGEN, URINE: NORMAL
WBC # BLD: 6.8 K/UL (ref 3.5–11)
WBC # BLD: ABNORMAL 10*3/UL
WBC UA: ABNORMAL /HPF
YEAST: ABNORMAL

## 2021-07-02 PROCEDURE — 87641 MR-STAPH DNA AMP PROBE: CPT

## 2021-07-02 PROCEDURE — 36415 COLL VENOUS BLD VENIPUNCTURE: CPT

## 2021-07-02 PROCEDURE — 81001 URINALYSIS AUTO W/SCOPE: CPT

## 2021-07-02 PROCEDURE — 80048 BASIC METABOLIC PNL TOTAL CA: CPT

## 2021-07-02 PROCEDURE — 85025 COMPLETE CBC W/AUTO DIFF WBC: CPT

## 2021-07-02 PROCEDURE — 93005 ELECTROCARDIOGRAM TRACING: CPT | Performed by: ANESTHESIOLOGY

## 2021-07-02 RX ORDER — OMEPRAZOLE 20 MG/1
20 CAPSULE, DELAYED RELEASE ORAL DAILY
COMMUNITY
End: 2022-02-09

## 2021-07-02 ASSESSMENT — PAIN DESCRIPTION - ORIENTATION: ORIENTATION: LEFT

## 2021-07-02 ASSESSMENT — PAIN DESCRIPTION - DESCRIPTORS: DESCRIPTORS: ACHING

## 2021-07-02 ASSESSMENT — PAIN DESCRIPTION - PAIN TYPE: TYPE: ACUTE PAIN

## 2021-07-02 ASSESSMENT — PAIN DESCRIPTION - LOCATION: LOCATION: KNEE

## 2021-07-02 ASSESSMENT — PAIN SCALES - GENERAL: PAINLEVEL_OUTOF10: 7

## 2021-07-02 NOTE — H&P
HISTORY and [id number]       NAME:  Shon Hodge  MRN: [id number]   YOB: 1955   Date: 7/2/2021   Age: 77 y.o. Gender: female       COMPLAINT AND PRESENT HISTORY:   Shon Hodge is 77 y.o.,  female, undergoing preadmission testing for Chronic pain of left knee,     Patient will be having:   KNEE TOTAL ARTHROPLASTY- LEFT. .  Pt is s/p Arthroscopy of left knee 10/6/2020. Patient was seen in office with Dr. 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 She has had several injections in the most recently during my absence in April none of which is really given her much relief. She is at the point where her pain is progressive she has lots of crepitus and grinding. She can no longer do her walks. Daily activities become significantly restricted. She is now wishing to proceed with total knee arthroplasty. XR taken taken most recently January 27, 2021 show basically severe medial compartment joint space narrowing with pretty significant progression compared to those taken on 9/21/2020. She has essentially bone-on-bone apposition she also has some moderate patellofemoral degenerative changes as well. No acute changes noted. Above notes reviewed with patient and she concurs; Patient denies any injury to the left knee,  She has a hx of Arthritis. Patient C/o  pain , swelling, stiffness, popping and cracking in the left Knee. Pt describes the pain as 7/10 in intensity with movement. Onset of symptoms started 1 year ago ( August 2020)  Patient reports that she has been using Vicodin , Motrin, Mobic off and on, she states now she is primarily using  Advil  to help in pain relief.       walking, steps, bending,aggravates sxs. Resting  and using cream with lidocaine helps alleviate.   Patient states that she uses the cane at home and uses cart in grocer store. The knee does have hx of buckle or give way under the patient,  She states not now. No locking up of the knee. No recent falls or trauma. Patient reports swelling more in the morning. Significant medical history:  HTN-Zesterotic,  HLD- diet, HEART PALPITATIONS,- off and on,  SVT-tenorimin, COPD-stable, SOB- upon exertion    Anticoagulants or blood thinners: motrin and vitamins  No chest pain,  diaphoresis. No recent URI,  fever or chills. Patient denies any issues with Anesthesia    Patient had her Labs and EKG done ( NSR with some ST changes) and requiring surgical clearance from her PCP.      X-rays taken in clinic and preliminarily reviewed by me: Dr. Lucy Zamarripa  AP bilateral knees and lateral view of the left knee taken in clinic today   and compared with those taken on 9/23/2020 Left knee with severe medial   compartmental narrowing that appears to have mild progression compared   with those taken on 9/21/2020.  She does appear to be approaching   bone-on-bone apposition in the medial compartment.  Moderate   patellofemoral degenerative changes present.  Right knee with mild to   moderate medial compartmental narrowing seen on AP view.  No evidence of   fracture or dislocation present    PAST MEDICAL HISTORY     Past Medical History:   Diagnosis Date    Arthritis     Bleb, lung ([address]     causes  pneumothorax     Colon polyps 11/18/2017    COPD (chronic obstructive pulmonary disease) ([address]     GERD (gastroesophageal reflux disease)     Heart palpitations     History of colon polyps 2002    Hyperlipidemia     Hyperplastic colon polyp 2016    Hypertension     Kidney infection 2004    Pneumothorax     spontaneous x 2, chest tube    Supraventricular tachycardia ([address]     first episode about 20 years ago, well controlled now with Atenolol     SURGICAL HISTORY       Past Surgical History:   Procedure Laterality Date    CHOLECYSTECTOMY, LAPAROSCOPIC  2010    COLONOSCOPY  2009    small hemorrhoids    COLONOSCOPY  2002    hemohhoids, hyperplastic polyp, right colon pathology-chronic inflammation in lumina propria with focal actue cryptitis consistent with active colitis    COLONOSCOPY  2016    flat sigmoid [id number]    COLONOSCOPY  2017    NO RECURRENT POLYPS SEEN AT THE PREVIOUS POLYPECTOMY SITE.  hOWEVER IN THE SIGMOID COLON AT ABOUT 50 CM FROM THE ANUS FLAT POLYP SEEN. , PATHOLOGY-- LARGE INTESTINAL TYPE MUCOSA WITH MIXED FEATURES OF     DILATION AND CURETTAGE      AUB    GYNECOLOGIC CRYOSURGERY  29's    KNEE ARTHROSCOPY Left 10/6/2020    KNEE ARTHROSCOPY PARTIAL MEDIAL MENISCECTOMY performed by Alka Mcleod MD at [facility] FL W/REMOVAL LESION BY  [facility] N/A 2017    COLONOSCOPY POLYPECTOMY HOT BIOPSY, COLD BIOPSY, APC SIGMOID, SPOT MARKING SIGMOID performed by Rosanna Sanches MD at [address] [facility]      hemorrhoids, no colitis seen    TONSILLECTOMY AND ADENOIDECTOMY      child        FAMILY HISTORY       Family History   Problem Relation Age of Onset    Cancer Father 28        bone sarcoma    Hypertension Mother    [facility] Other Mother         peripheral neuropathy    Uterine Cancer Maternal Aunt     Breast Cancer Other         dx first before age 48 and again @ 79    Colon Cancer Maternal Aunt         all dx before age 48    Colon Cancer Maternal Uncle         1 uncle in 42's dx, other in 68's    Colon Cancer Other         multiple cousins dx before age 48   [facility] Uterine Cancer Paternal Aunt         dx after    Ovarian Cancer Paternal Aunt         dx after age 48   [facility] Lung Cancer Paternal Aunt         dx after 48    Colon Cancer Paternal Grandfather         dx after age 48    Colon Cancer Maternal Grandmother 55    Liver Cancer Maternal Grandmother     Diabetes Neg Hx     Eclampsia Neg Hx      Labor Neg Hx     Spont Abortions Neg Hx     Stroke Neg Hx     Depression Neg Hx        SOCIAL HISTORY       Social History     Socioeconomic History    Marital status:      Spouse name: Luis Bolanos Number of children: 2    Years of education: None    Highest education level: Associate degree: occupational, technical, or vocational program   Occupational History    None   Tobacco Use    Smoking status: Former Smoker     Packs/day: 1.00     Years: 20.00     Pack years: 20.00     Types: Cigarettes     Quit date: 9/15/1996     Years since quittin.8    Smokeless tobacco: Never Used   Vaping Use    Vaping Use: Never used   Substance and Sexual Activity    Alcohol use: Yes     Alcohol/week: 0.0 standard drinks     Comment: occasional    Drug use: No    Sexual activity: Yes     Partners: Male     Birth control/protection: Surgical     Comment:  vasectomy   Other Topics Concern    None   Social History Narrative    None     Social Determinants of Health     Financial Resource Strain: Low Risk     Difficulty of Paying Living Expenses: Not hard at all   Food Insecurity: No Food Insecurity    Worried About Running Out of Food in the Last Year: Never true    Ernesto of Food in the Last Year: Never true   Transportation Needs: No Transportation Needs    Lack of Transportation (Medical): No    Lack of Transportation (Non-Medical): No   Physical Activity: Sufficiently Active    Days of Exercise per Week: 5 days    Minutes of Exercise per Session: 50 min   Stress: No Stress Concern Present    Feeling of Stress : Only a little   Social Connections: Moderately Isolated    Frequency of Communication with Friends and Family: More than three times a week    Frequency of Social Gatherings with Friends and Family:  Three times a week    Attends Rastafarian Services: Never    Active Member of Clubs or Organizations: No    Attends Club or Organization Meetings: Not asked    Marital Status:    Intimate Partner Violence: Not At Risk    Fear of Current or Ex-Partner: No    Emotionally Abused: No    Physically Abused: No    Sexually Abused: No     REVIEW OF SYSTEMS      Allergies   Allergen Reactions    Avelox [Moxifloxacin]     Biaxin [Clarithromycin] Hives    Epinephrine Other (See Comments)     Increased BP, Tachycardia    Neosporin [Neomycin-Polymyxin-Gramicidin]     Nickel     Sulfa Antibiotics     Adhesive Tape Rash    Phenylephrine Rash       Current Outpatient Medications on File Prior to Encounter   Medication Sig Dispense Refill    omeprazole (PRILOSEC) 20 MG delayed release capsule Take 20 mg by mouth Daily      Probiotic Product (PROBIOTIC DAILY PO) Take by mouth daily      lisinopril-hydroCHLOROthiazide (PRINZIDE;ZESTORETIC) 20-12.5 MG per tablet TAKE 1 TABLET BY MOUTH ONCE DAILY 30 tablet 0    atenolol (TENORMIN) 25 MG tablet TAKE 1 TABLET BY MOUTH TWICE DAILY 60 tablet 0    ibuprofen (ADVIL;MOTRIN) 800 MG tablet Take 1 tablet by mouth 2 times daily as needed for Pain 60 tablet 5    Multiple Vitamins-Minerals (THERAPEUTIC MULTIVITAMIN-MINERALS) tablet Take 1 tablet by mouth daily      Turmeric 500 MG CAPS Take by mouth daily      Cholecalciferol (VITAMIN D PO) Take 1 tablet by mouth daily      ciprofloxacin-dexamethasone (CIPRODEX) 0.3-0.1 % otic suspension Instill 1-2 drops into each ear as directed 1 Bottle 2     No current facility-administered medications on file prior to encounter. General health:  Fairly good. No fever or chills. Skin:  No itching, redness or rash. HEENT:  No headache, epistaxis or sore throat. Neck:  No pain, stiffness or masses. Cardiovascular/Respiratory system:  No chest pain, palpitation or shortness of breath. Gastrointestinal tract: No abdominal pain, Dysphagia, nausea, vomiting, diarrhea or constipation. Genitourinary:  No burning on micturition. No hesitancy, urgency, frequency or discoloration of urine. Locomotor:  See HPI. Neuropsychiatric:  No referable complaints. GENERAL PHYSICAL EXAM:     Vitals: BP (!) 146/97   Pulse 71   Temp 97.8 °F (36.6 °C) (Infrared)   Resp 16   Ht 5' 7\" (1.702 m)   Wt 195 lb (88.5 kg)   LMP 06/26/1997   SpO2 99%   BMI 30.54 kg/m²  Body mass index is 30.54 kg/m². GENERAL APPEARANCE:   Senait Henderson is 77 y.o.,  female, moderately obese, nourished, conscious, alert. Does not appear to be distress or pain at this time. SKIN:  Warm, dry, no cyanosis or jaundice. HEAD:  Normocephalic, atraumatic, no swelling or tenderness. EYES:  Pupils equal, reactive to light. EARS:  No discharge, no marked hearing loss. NOSE:  No rhinorrhea, epistaxis or septal deformity. THROAT:  Not congested. No ulceration bleeding or discharge. NECK:  No stiffness, trachea central.                  CHEST:  Symmetrical and equal on expansion. HEART:  RRR S1 > S2. No audible murmurs or gallops. LUNGS:  Equal on expansion, normal breath sounds. No adventitious sounds. ABDOMEN:  Obese. Soft on palpation. No localized tenderness. No guarding or rigidity. LYMPHATICS:  No palpable cervical lymphadenopathy. LOCOMOTOR, BACK AND SPINE:  No tenderness or deformities. Patient ambulates with stiffness to left leg, favoring it from bending                 EXTREMITIES:  Symmetrical, no pretibial edema. No calf tenderness. No discoloration or ulcerations. Tenderness on palpation of the left Knee joint space worse on the medial aspect. NEUROLOGIC:  The patient is conscious, alert, oriented,Cranial nerve II-XII intact, taste and smell were not examined. No apparent focal sensory or motor deficits.                                                                                      PROVISIONAL DIAGNOSES / SURGERY:       KNEE TOTAL ARTHROPLASTY    CHRONIC PAIN OF LEFT KNEE    Patient Active Problem List    Diagnosis Date Noted    Colon polyps 11/18/2017    Diarrhea 09/27/2017    Hyperplastic colon polyp     Change in bowel function 02/10/2016    Rectal bleeding 02/10/2016    History of colon polyps     COPD (chronic obstructive pulmonary disease) ([facility]     Hyperlipidemia     Heart palpitations            ANA CRISTINA LYNCH, APRN - CNP on 7/2/2021 at 11:36 AM

## 2021-07-02 NOTE — PROGRESS NOTES
Dr. Radha Marroquin, anesthesia, was contacted and informed of the patient's planned surgery, history, and unconfirmed abnormal EKG results from EKG done today in PAT. Medical clearance required. Surgery scheduling will notify Dr. Fran Urias office who will be responsible for making sure the clearance is obtained and is in the chart for surgery. Voice mail left for Leslie Warner at Dr Fran Urias office notified him of nickel allergy.

## 2021-07-03 LAB
EKG ATRIAL RATE: 65 BPM
EKG P AXIS: 53 DEGREES
EKG P-R INTERVAL: 132 MS
EKG Q-T INTERVAL: 414 MS
EKG QRS DURATION: 82 MS
EKG QTC CALCULATION (BAZETT): 430 MS
EKG R AXIS: -7 DEGREES
EKG T AXIS: 28 DEGREES
EKG VENTRICULAR RATE: 65 BPM
MRSA, DNA, NASAL: NORMAL
SPECIMEN DESCRIPTION: NORMAL

## 2021-07-03 PROCEDURE — 93010 ELECTROCARDIOGRAM REPORT: CPT | Performed by: INTERNAL MEDICINE

## 2021-07-16 ENCOUNTER — HOSPITAL ENCOUNTER (OUTPATIENT)
Dept: NUCLEAR MEDICINE | Age: 66
Discharge: HOME | End: 2021-07-18
Payer: MEDICARE

## 2021-07-16 ENCOUNTER — HOSPITAL ENCOUNTER (OUTPATIENT)
Dept: GENERAL RADIOLOGY | Age: 66
Discharge: HOME | End: 2021-07-18
Payer: MEDICARE

## 2021-07-16 ENCOUNTER — HOSPITAL ENCOUNTER (OUTPATIENT)
Age: 66
Discharge: HOME | End: 2021-07-18
Payer: MEDICARE

## 2021-07-16 ENCOUNTER — HOSPITAL ENCOUNTER (OUTPATIENT)
Dept: NON INVASIVE DIAGNOSTICS | Age: 66
Discharge: HOME | End: 2021-07-16
Payer: MEDICARE

## 2021-07-16 VITALS — BODY MASS INDEX: 31.34 KG/M2 | HEIGHT: 66 IN | WEIGHT: 195 LBS

## 2021-07-16 DIAGNOSIS — J41.0 SIMPLE CHRONIC BRONCHITIS (HCC): ICD-10-CM

## 2021-07-16 DIAGNOSIS — R94.31 ABNORMAL EKG: ICD-10-CM

## 2021-07-16 LAB
LV EF: 63 %
LVEF MODALITY: NORMAL

## 2021-07-16 PROCEDURE — A9500 TC99M SESTAMIBI: HCPCS | Performed by: FAMILY MEDICINE

## 2021-07-16 PROCEDURE — 3430000000 HC RX DIAGNOSTIC RADIOPHARMACEUTICAL: Performed by: FAMILY MEDICINE

## 2021-07-16 PROCEDURE — 2580000003 HC RX 258: Performed by: FAMILY MEDICINE

## 2021-07-16 PROCEDURE — 71046 X-RAY EXAM CHEST 2 VIEWS: CPT

## 2021-07-16 PROCEDURE — 78452 HT MUSCLE IMAGE SPECT MULT: CPT

## 2021-07-16 PROCEDURE — 6360000002 HC RX W HCPCS: Performed by: FAMILY MEDICINE

## 2021-07-16 PROCEDURE — 93017 CV STRESS TEST TRACING ONLY: CPT

## 2021-07-16 RX ORDER — ALBUTEROL SULFATE 90 UG/1
2 AEROSOL, METERED RESPIRATORY (INHALATION) PRN
Status: DISCONTINUED | OUTPATIENT
Start: 2021-07-16 | End: 2021-07-16 | Stop reason: HOSPADM

## 2021-07-16 RX ORDER — ATROPINE SULFATE 0.1 MG/ML
0.5 INJECTION INTRAVENOUS EVERY 5 MIN PRN
Status: DISCONTINUED | OUTPATIENT
Start: 2021-07-16 | End: 2021-07-16 | Stop reason: HOSPADM

## 2021-07-16 RX ORDER — NITROGLYCERIN 0.4 MG/1
0.4 TABLET SUBLINGUAL EVERY 5 MIN PRN
Status: DISCONTINUED | OUTPATIENT
Start: 2021-07-16 | End: 2021-07-16 | Stop reason: HOSPADM

## 2021-07-16 RX ORDER — SODIUM CHLORIDE 9 MG/ML
500 INJECTION, SOLUTION INTRAVENOUS CONTINUOUS PRN
Status: DISCONTINUED | OUTPATIENT
Start: 2021-07-16 | End: 2021-07-16 | Stop reason: HOSPADM

## 2021-07-16 RX ORDER — SODIUM CHLORIDE 0.9 % (FLUSH) 0.9 %
5-40 SYRINGE (ML) INJECTION PRN
Status: DISCONTINUED | OUTPATIENT
Start: 2021-07-16 | End: 2021-07-16 | Stop reason: HOSPADM

## 2021-07-16 RX ORDER — SODIUM CHLORIDE 0.9 % (FLUSH) 0.9 %
10 SYRINGE (ML) INJECTION PRN
Status: DISCONTINUED | OUTPATIENT
Start: 2021-07-16 | End: 2021-07-19 | Stop reason: HOSPADM

## 2021-07-16 RX ORDER — METOPROLOL TARTRATE 5 MG/5ML
5 INJECTION INTRAVENOUS EVERY 5 MIN PRN
Status: DISCONTINUED | OUTPATIENT
Start: 2021-07-16 | End: 2021-07-16 | Stop reason: HOSPADM

## 2021-07-16 RX ORDER — AMINOPHYLLINE DIHYDRATE 25 MG/ML
50 INJECTION, SOLUTION INTRAVENOUS PRN
Status: DISCONTINUED | OUTPATIENT
Start: 2021-07-16 | End: 2021-07-16 | Stop reason: HOSPADM

## 2021-07-16 RX ADMIN — TETRAKIS(2-METHOXYISOBUTYLISOCYANIDE)COPPER(I) TETRAFLUOROBORATE 11.1 MILLICURIE: 1 INJECTION, POWDER, LYOPHILIZED, FOR SOLUTION INTRAVENOUS at 07:45

## 2021-07-16 RX ADMIN — Medication 10 ML: at 07:45

## 2021-07-16 RX ADMIN — REGADENOSON 0.4 MG: 0.08 INJECTION, SOLUTION INTRAVENOUS at 09:50

## 2021-07-16 RX ADMIN — TETRAKIS(2-METHOXYISOBUTYLISOCYANIDE)COPPER(I) TETRAFLUOROBORATE 32.9 MILLICURIE: 1 INJECTION, POWDER, LYOPHILIZED, FOR SOLUTION INTRAVENOUS at 09:51

## 2021-07-16 RX ADMIN — Medication 10 ML: at 09:35

## 2021-07-16 NOTE — PROCEDURES
[address] [facility]                    [address] [facility]. [address]                              CARDIAC STRESS TEST    PATIENT NAME: Armen Albarran                 :        1955  MED REC NO:   [id number]                              ROOM:  ACCOUNT NO:   [de-identified]                           ADMIT DATE: 2021  PROVIDER:     Sorin Bhandari DO    DATE OF STUDY:  2021    TEST TYPE: LEXISCAN CARDIOLYTE STRESS TEST  INDICATION: SHORTNESS OF BREATH, EKG ABNORMALITIES, PRE-OP  REFERRING PHYSICIAN: DR. Brain Coronado    RESTING HEART RATE: 55 BEATS PER MINUTE  RESTING BLOOD PRESSURE: 157/89    MEDICATION(S) GIVEN: 0.4MG IV LEXISCAN  REASON FOR TERMINATION: MEDICATION INFUSION COMPLETE    RESTING EKG: ABNORMAL  COMMENTS: SINUS BRADYCARDIA, NON-SPECIFIC ST/T CHANGE  STRESS HEART RESPONSE: NORMAL RESPONSE  BLOOD PRESSURE RESPONSE: APPROPRIATE  CHEST DISCOMFORT: NO PAIN  ISCHEMIC EKG CHANGES: NONE    EKG IMPRESSION: ELECTROCARDIOGRAPHICALLY NEGATIVE LEXISCAN STRESS TEST. RADIOISOTOPE RESULTS TO FOLLOW FROM THE DEPARTMENT OF NUCLEAR MEDICINE.             [id number]    D: 2021 11:19:11       T: 2021 11:20:38     /[id number]  Job#: [id number]     Doc#: Unknown    CC:    (Retain this field even if not dictated or not decipherable)

## 2021-07-16 NOTE — PROGRESS NOTES
CST Lexiscan. Stress Tech performs patient preparation of physical comfort, review test procedures, pre-stress EKG. Lung Sounds clear t/o. Consent verified by pt. .  Educated patient on test procedure and possible side effects of Lexiscan as well as s/s to report. Cardiologist reviewed pre-test EKG and is present for test.  Patient tolerated test well with SOB, warmth and light-headedness, all of which resolved to baseline after test with caffeine. Start /89 HR 55  Stop /92 HR 86  EKG portion of testing is completed and negative, nuc. med. portion is still pending.

## 2021-08-11 ENCOUNTER — ANESTHESIA EVENT (OUTPATIENT)
Dept: OPERATING ROOM | Age: 66
End: 2021-08-11
Payer: MEDICARE

## 2021-08-12 ENCOUNTER — APPOINTMENT (OUTPATIENT)
Dept: GENERAL RADIOLOGY | Age: 66
End: 2021-08-12
Attending: ORTHOPAEDIC SURGERY
Payer: MEDICARE

## 2021-08-12 ENCOUNTER — HOSPITAL ENCOUNTER (OUTPATIENT)
Age: 66
Discharge: HOME HEALTH | End: 2021-08-12
Attending: ORTHOPAEDIC SURGERY | Admitting: ORTHOPAEDIC SURGERY
Payer: MEDICARE

## 2021-08-12 ENCOUNTER — ANESTHESIA (OUTPATIENT)
Dept: OPERATING ROOM | Age: 66
End: 2021-08-12
Payer: MEDICARE

## 2021-08-12 VITALS
OXYGEN SATURATION: 92 % | DIASTOLIC BLOOD PRESSURE: 79 MMHG | HEART RATE: 88 BPM | RESPIRATION RATE: 16 BRPM | BODY MASS INDEX: 31.34 KG/M2 | HEIGHT: 66 IN | WEIGHT: 195 LBS | TEMPERATURE: 99 F | SYSTOLIC BLOOD PRESSURE: 123 MMHG

## 2021-08-12 VITALS — TEMPERATURE: 96.1 F | SYSTOLIC BLOOD PRESSURE: 143 MMHG | OXYGEN SATURATION: 100 % | DIASTOLIC BLOOD PRESSURE: 101 MMHG

## 2021-08-12 DIAGNOSIS — M25.562 CHRONIC PAIN OF LEFT KNEE: Primary | ICD-10-CM

## 2021-08-12 DIAGNOSIS — G89.29 CHRONIC PAIN OF LEFT KNEE: Primary | ICD-10-CM

## 2021-08-12 DIAGNOSIS — M17.12 PRIMARY OSTEOARTHRITIS OF LEFT KNEE: Primary | ICD-10-CM

## 2021-08-12 DIAGNOSIS — Z98.890 S/P LEFT KNEE ARTHROSCOPY: ICD-10-CM

## 2021-08-12 PROCEDURE — 3600000003 HC SURGERY LEVEL 3 BASE: Performed by: ORTHOPAEDIC SURGERY

## 2021-08-12 PROCEDURE — 6360000002 HC RX W HCPCS: Performed by: ORTHOPAEDIC SURGERY

## 2021-08-12 PROCEDURE — 97110 THERAPEUTIC EXERCISES: CPT

## 2021-08-12 PROCEDURE — C1776 JOINT DEVICE (IMPLANTABLE): HCPCS | Performed by: ORTHOPAEDIC SURGERY

## 2021-08-12 PROCEDURE — 27447 TOTAL KNEE ARTHROPLASTY: CPT | Performed by: ORTHOPAEDIC SURGERY

## 2021-08-12 PROCEDURE — 97530 THERAPEUTIC ACTIVITIES: CPT

## 2021-08-12 PROCEDURE — 2580000003 HC RX 258: Performed by: ANESTHESIOLOGY

## 2021-08-12 PROCEDURE — 73560 X-RAY EXAM OF KNEE 1 OR 2: CPT

## 2021-08-12 PROCEDURE — 6370000000 HC RX 637 (ALT 250 FOR IP): Performed by: ORTHOPAEDIC SURGERY

## 2021-08-12 PROCEDURE — 2500000003 HC RX 250 WO HCPCS: Performed by: ORTHOPAEDIC SURGERY

## 2021-08-12 PROCEDURE — 97166 OT EVAL MOD COMPLEX 45 MIN: CPT

## 2021-08-12 PROCEDURE — 97535 SELF CARE MNGMENT TRAINING: CPT

## 2021-08-12 PROCEDURE — 2500000003 HC RX 250 WO HCPCS

## 2021-08-12 PROCEDURE — 7100000001 HC PACU RECOVERY - ADDTL 15 MIN: Performed by: ORTHOPAEDIC SURGERY

## 2021-08-12 PROCEDURE — 2720000010 HC SURG SUPPLY STERILE: Performed by: ORTHOPAEDIC SURGERY

## 2021-08-12 PROCEDURE — 3600000013 HC SURGERY LEVEL 3 ADDTL 15MIN: Performed by: ORTHOPAEDIC SURGERY

## 2021-08-12 PROCEDURE — 7100000000 HC PACU RECOVERY - FIRST 15 MIN: Performed by: ORTHOPAEDIC SURGERY

## 2021-08-12 PROCEDURE — 3700000000 HC ANESTHESIA ATTENDED CARE: Performed by: ORTHOPAEDIC SURGERY

## 2021-08-12 PROCEDURE — C1713 ANCHOR/SCREW BN/BN,TIS/BN: HCPCS | Performed by: ORTHOPAEDIC SURGERY

## 2021-08-12 PROCEDURE — 97162 PT EVAL MOD COMPLEX 30 MIN: CPT

## 2021-08-12 PROCEDURE — 3700000001 HC ADD 15 MINUTES (ANESTHESIA): Performed by: ORTHOPAEDIC SURGERY

## 2021-08-12 PROCEDURE — 6360000002 HC RX W HCPCS

## 2021-08-12 PROCEDURE — 2709999900 HC NON-CHARGEABLE SUPPLY: Performed by: ORTHOPAEDIC SURGERY

## 2021-08-12 PROCEDURE — 2580000003 HC RX 258: Performed by: ORTHOPAEDIC SURGERY

## 2021-08-12 DEVICE — TRAY TIB L71MM KNEE COPOLYESTER SIL INTLOK PRI CEM VANGUARD: Type: IMPLANTABLE DEVICE | Site: KNEE | Status: FUNCTIONAL

## 2021-08-12 DEVICE — CEMENT BNE 40GM HI VISC RADPQ FOR REV SURG: Type: IMPLANTABLE DEVICE | Site: KNEE | Status: FUNCTIONAL

## 2021-08-12 DEVICE — BEARING TIB L71MM THK16MM KNEE ARCM ANT STBL MOD COMPLT: Type: IMPLANTABLE DEVICE | Site: KNEE | Status: FUNCTIONAL

## 2021-08-12 DEVICE — IMPLANTABLE DEVICE: Type: IMPLANTABLE DEVICE | Site: KNEE | Status: FUNCTIONAL

## 2021-08-12 DEVICE — STEM TIB L80MM DIA10MM KNEE PRI FIN VANGUARD COMPLT SYS: Type: IMPLANTABLE DEVICE | Site: KNEE | Status: FUNCTIONAL

## 2021-08-12 DEVICE — COMPONENT PAT DIA34MM THK8.5MM STD KNEE TI ALLY S STL: Type: IMPLANTABLE DEVICE | Site: KNEE | Status: FUNCTIONAL

## 2021-08-12 RX ORDER — ACETAMINOPHEN 325 MG/1
650 TABLET ORAL EVERY 6 HOURS
Status: DISCONTINUED | OUTPATIENT
Start: 2021-08-12 | End: 2021-08-12 | Stop reason: HOSPADM

## 2021-08-12 RX ORDER — OXYCODONE HYDROCHLORIDE 10 MG/1
10 TABLET ORAL EVERY 4 HOURS PRN
Status: DISCONTINUED | OUTPATIENT
Start: 2021-08-12 | End: 2021-08-12 | Stop reason: HOSPADM

## 2021-08-12 RX ORDER — TRANEXAMIC ACID 100 MG/ML
INJECTION, SOLUTION INTRAVENOUS PRN
Status: DISCONTINUED | OUTPATIENT
Start: 2021-08-12 | End: 2021-08-12 | Stop reason: SDUPTHER

## 2021-08-12 RX ORDER — PROPOFOL 10 MG/ML
INJECTION, EMULSION INTRAVENOUS PRN
Status: DISCONTINUED | OUTPATIENT
Start: 2021-08-12 | End: 2021-08-12 | Stop reason: SDUPTHER

## 2021-08-12 RX ORDER — SODIUM CHLORIDE 9 MG/ML
25 INJECTION, SOLUTION INTRAVENOUS PRN
Status: DISCONTINUED | OUTPATIENT
Start: 2021-08-12 | End: 2021-08-12 | Stop reason: HOSPADM

## 2021-08-12 RX ORDER — CALCIUM CHLORIDE 100 MG/ML
INJECTION INTRAVENOUS; INTRAVENTRICULAR PRN
Status: DISCONTINUED | OUTPATIENT
Start: 2021-08-12 | End: 2021-08-12 | Stop reason: ALTCHOICE

## 2021-08-12 RX ORDER — DEXAMETHASONE SODIUM PHOSPHATE 4 MG/ML
INJECTION, SOLUTION INTRA-ARTICULAR; INTRALESIONAL; INTRAMUSCULAR; INTRAVENOUS; SOFT TISSUE PRN
Status: DISCONTINUED | OUTPATIENT
Start: 2021-08-12 | End: 2021-08-12 | Stop reason: SDUPTHER

## 2021-08-12 RX ORDER — SENNA AND DOCUSATE SODIUM 50; 8.6 MG/1; MG/1
1 TABLET, FILM COATED ORAL 2 TIMES DAILY
Status: DISCONTINUED | OUTPATIENT
Start: 2021-08-12 | End: 2021-08-12 | Stop reason: HOSPADM

## 2021-08-12 RX ORDER — DEXAMETHASONE SODIUM PHOSPHATE 10 MG/ML
10 INJECTION, SOLUTION INTRAMUSCULAR; INTRAVENOUS ONCE
Status: COMPLETED | OUTPATIENT
Start: 2021-08-12 | End: 2021-08-12

## 2021-08-12 RX ORDER — NEOSTIGMINE METHYLSULFATE 5 MG/5 ML
SYRINGE (ML) INTRAVENOUS PRN
Status: DISCONTINUED | OUTPATIENT
Start: 2021-08-12 | End: 2021-08-12 | Stop reason: SDUPTHER

## 2021-08-12 RX ORDER — HYDROCODONE BITARTRATE AND ACETAMINOPHEN 5; 325 MG/1; MG/1
1 TABLET ORAL EVERY 4 HOURS PRN
Qty: 42 TABLET | Refills: 0 | Status: SHIPPED | OUTPATIENT
Start: 2021-08-12 | End: 2021-09-02 | Stop reason: SDUPTHER

## 2021-08-12 RX ORDER — ONDANSETRON 2 MG/ML
INJECTION INTRAMUSCULAR; INTRAVENOUS PRN
Status: DISCONTINUED | OUTPATIENT
Start: 2021-08-12 | End: 2021-08-12 | Stop reason: SDUPTHER

## 2021-08-12 RX ORDER — OXYCODONE HYDROCHLORIDE 5 MG/1
5 TABLET ORAL EVERY 4 HOURS PRN
Status: DISCONTINUED | OUTPATIENT
Start: 2021-08-12 | End: 2021-08-12 | Stop reason: HOSPADM

## 2021-08-12 RX ORDER — LISINOPRIL 20 MG/1
20 TABLET ORAL PRN
COMMUNITY
End: 2021-10-12 | Stop reason: SDUPTHER

## 2021-08-12 RX ORDER — GABAPENTIN 600 MG/1
600 TABLET ORAL ONCE
Status: COMPLETED | OUTPATIENT
Start: 2021-08-12 | End: 2021-08-12

## 2021-08-12 RX ORDER — PHENYLEPHRINE HYDROCHLORIDE 10 MG/ML
INJECTION INTRAVENOUS PRN
Status: DISCONTINUED | OUTPATIENT
Start: 2021-08-12 | End: 2021-08-12 | Stop reason: SDUPTHER

## 2021-08-12 RX ORDER — SODIUM CHLORIDE 0.9 % (FLUSH) 0.9 %
10 SYRINGE (ML) INJECTION PRN
Status: DISCONTINUED | OUTPATIENT
Start: 2021-08-12 | End: 2021-08-12 | Stop reason: HOSPADM

## 2021-08-12 RX ORDER — SODIUM CHLORIDE, SODIUM LACTATE, POTASSIUM CHLORIDE, CALCIUM CHLORIDE 600; 310; 30; 20 MG/100ML; MG/100ML; MG/100ML; MG/100ML
INJECTION, SOLUTION INTRAVENOUS CONTINUOUS
Status: DISCONTINUED | OUTPATIENT
Start: 2021-08-12 | End: 2021-08-12

## 2021-08-12 RX ORDER — SCOLOPAMINE TRANSDERMAL SYSTEM 1 MG/1
1 PATCH, EXTENDED RELEASE TRANSDERMAL ONCE
Status: DISCONTINUED | OUTPATIENT
Start: 2021-08-12 | End: 2021-08-12

## 2021-08-12 RX ORDER — ONDANSETRON 2 MG/ML
4 INJECTION INTRAMUSCULAR; INTRAVENOUS EVERY 6 HOURS PRN
Status: DISCONTINUED | OUTPATIENT
Start: 2021-08-12 | End: 2021-08-12 | Stop reason: HOSPADM

## 2021-08-12 RX ORDER — GLYCOPYRROLATE 1 MG/5 ML
SYRINGE (ML) INTRAVENOUS PRN
Status: DISCONTINUED | OUTPATIENT
Start: 2021-08-12 | End: 2021-08-12 | Stop reason: SDUPTHER

## 2021-08-12 RX ORDER — ACETAMINOPHEN 500 MG
1000 TABLET ORAL ONCE
Status: COMPLETED | OUTPATIENT
Start: 2021-08-12 | End: 2021-08-12

## 2021-08-12 RX ORDER — SODIUM CHLORIDE, SODIUM LACTATE, POTASSIUM CHLORIDE, CALCIUM CHLORIDE 600; 310; 30; 20 MG/100ML; MG/100ML; MG/100ML; MG/100ML
INJECTION, SOLUTION INTRAVENOUS CONTINUOUS
Status: DISCONTINUED | OUTPATIENT
Start: 2021-08-12 | End: 2021-08-12 | Stop reason: HOSPADM

## 2021-08-12 RX ORDER — OXYCODONE HYDROCHLORIDE AND ACETAMINOPHEN 5; 325 MG/1; MG/1
1-2 TABLET ORAL EVERY 4 HOURS PRN
Qty: 42 TABLET | Refills: 0 | Status: SHIPPED | OUTPATIENT
Start: 2021-08-12 | End: 2021-08-19

## 2021-08-12 RX ORDER — SODIUM CHLORIDE 0.9 % (FLUSH) 0.9 %
10 SYRINGE (ML) INJECTION EVERY 12 HOURS SCHEDULED
Status: DISCONTINUED | OUTPATIENT
Start: 2021-08-12 | End: 2021-08-12 | Stop reason: HOSPADM

## 2021-08-12 RX ORDER — FENTANYL CITRATE 50 UG/ML
INJECTION, SOLUTION INTRAMUSCULAR; INTRAVENOUS PRN
Status: DISCONTINUED | OUTPATIENT
Start: 2021-08-12 | End: 2021-08-12 | Stop reason: SDUPTHER

## 2021-08-12 RX ORDER — LIDOCAINE HYDROCHLORIDE 10 MG/ML
INJECTION, SOLUTION EPIDURAL; INFILTRATION; INTRACAUDAL; PERINEURAL PRN
Status: DISCONTINUED | OUTPATIENT
Start: 2021-08-12 | End: 2021-08-12 | Stop reason: SDUPTHER

## 2021-08-12 RX ORDER — ASPIRIN 81 MG/1
81 TABLET ORAL 2 TIMES DAILY
Qty: 90 TABLET | Refills: 1 | Status: SHIPPED | OUTPATIENT
Start: 2021-08-12 | End: 2021-12-17

## 2021-08-12 RX ORDER — ONDANSETRON 4 MG/1
4 TABLET, ORALLY DISINTEGRATING ORAL EVERY 8 HOURS PRN
Status: DISCONTINUED | OUTPATIENT
Start: 2021-08-12 | End: 2021-08-12 | Stop reason: HOSPADM

## 2021-08-12 RX ORDER — ASPIRIN 81 MG/1
81 TABLET ORAL 2 TIMES DAILY
Status: DISCONTINUED | OUTPATIENT
Start: 2021-08-12 | End: 2021-08-12 | Stop reason: HOSPADM

## 2021-08-12 RX ORDER — ROCURONIUM BROMIDE 10 MG/ML
INJECTION, SOLUTION INTRAVENOUS PRN
Status: DISCONTINUED | OUTPATIENT
Start: 2021-08-12 | End: 2021-08-12 | Stop reason: SDUPTHER

## 2021-08-12 RX ADMIN — Medication 1 MG: at 09:02

## 2021-08-12 RX ADMIN — Medication 2.5 MG: at 08:57

## 2021-08-12 RX ADMIN — FENTANYL CITRATE 50 MCG: 50 INJECTION, SOLUTION INTRAMUSCULAR; INTRAVENOUS at 07:36

## 2021-08-12 RX ADMIN — PHENYLEPHRINE HYDROCHLORIDE 50 MCG: 10 INJECTION INTRAVENOUS at 07:54

## 2021-08-12 RX ADMIN — CEFAZOLIN SODIUM 2000 MG: 10 INJECTION, POWDER, FOR SOLUTION INTRAVENOUS at 07:45

## 2021-08-12 RX ADMIN — TRANEXAMIC ACID 1000 MG: 100 INJECTION, SOLUTION INTRAVENOUS at 08:42

## 2021-08-12 RX ADMIN — ACETAMINOPHEN 650 MG: 325 TABLET ORAL at 13:45

## 2021-08-12 RX ADMIN — ROCURONIUM BROMIDE 10 MG: 10 INJECTION, SOLUTION INTRAVENOUS at 08:02

## 2021-08-12 RX ADMIN — DEXTROSE MONOHYDRATE 2000 MG: 50 INJECTION, SOLUTION INTRAVENOUS at 15:36

## 2021-08-12 RX ADMIN — ONDANSETRON 4 MG: 2 INJECTION, SOLUTION INTRAMUSCULAR; INTRAVENOUS at 08:56

## 2021-08-12 RX ADMIN — ROCURONIUM BROMIDE 40 MG: 10 INJECTION, SOLUTION INTRAVENOUS at 07:37

## 2021-08-12 RX ADMIN — SODIUM CHLORIDE, POTASSIUM CHLORIDE, SODIUM LACTATE AND CALCIUM CHLORIDE: 600; 310; 30; 20 INJECTION, SOLUTION INTRAVENOUS at 13:49

## 2021-08-12 RX ADMIN — ACETAMINOPHEN 1000 MG: 500 TABLET ORAL at 06:16

## 2021-08-12 RX ADMIN — DEXAMETHASONE SODIUM PHOSPHATE 10 MG: 10 INJECTION, SOLUTION INTRAMUSCULAR; INTRAVENOUS at 06:32

## 2021-08-12 RX ADMIN — DEXAMETHASONE SODIUM PHOSPHATE 8 MG: 4 INJECTION, SOLUTION INTRAMUSCULAR; INTRAVENOUS at 07:42

## 2021-08-12 RX ADMIN — GABAPENTIN 600 MG: 600 TABLET, FILM COATED ORAL at 06:16

## 2021-08-12 RX ADMIN — DOCUSATE SODIUM 50MG AND SENNOSIDES 8.6MG 1 TABLET: 8.6; 5 TABLET, FILM COATED ORAL at 13:45

## 2021-08-12 RX ADMIN — LIDOCAINE HYDROCHLORIDE 50 MG: 10 INJECTION, SOLUTION EPIDURAL; INFILTRATION; INTRACAUDAL; PERINEURAL at 07:36

## 2021-08-12 RX ADMIN — SODIUM CHLORIDE, POTASSIUM CHLORIDE, SODIUM LACTATE AND CALCIUM CHLORIDE: 600; 310; 30; 20 INJECTION, SOLUTION INTRAVENOUS at 06:32

## 2021-08-12 RX ADMIN — PROPOFOL 160 MG: 10 INJECTION, EMULSION INTRAVENOUS at 07:36

## 2021-08-12 RX ADMIN — TRANEXAMIC ACID 1000 MG: 100 INJECTION, SOLUTION INTRAVENOUS at 08:02

## 2021-08-12 RX ADMIN — FENTANYL CITRATE 25 MCG: 50 INJECTION, SOLUTION INTRAMUSCULAR; INTRAVENOUS at 08:44

## 2021-08-12 RX ADMIN — Medication 0.4 MG: at 08:57

## 2021-08-12 RX ADMIN — Medication 0.2 MG: at 09:02

## 2021-08-12 RX ADMIN — FENTANYL CITRATE 25 MCG: 50 INJECTION, SOLUTION INTRAMUSCULAR; INTRAVENOUS at 09:12

## 2021-08-12 ASSESSMENT — PULMONARY FUNCTION TESTS
PIF_VALUE: 22
PIF_VALUE: 22
PIF_VALUE: 18
PIF_VALUE: 16
PIF_VALUE: 22
PIF_VALUE: 21
PIF_VALUE: 19
PIF_VALUE: 19
PIF_VALUE: 25
PIF_VALUE: 21
PIF_VALUE: 25
PIF_VALUE: 20
PIF_VALUE: 25
PIF_VALUE: 24
PIF_VALUE: 22
PIF_VALUE: 1
PIF_VALUE: 22
PIF_VALUE: 24
PIF_VALUE: 22
PIF_VALUE: 22
PIF_VALUE: 0
PIF_VALUE: 21
PIF_VALUE: 22
PIF_VALUE: 21
PIF_VALUE: 22
PIF_VALUE: 21
PIF_VALUE: 0
PIF_VALUE: 19
PIF_VALUE: 21
PIF_VALUE: 2
PIF_VALUE: 22
PIF_VALUE: 17
PIF_VALUE: 23
PIF_VALUE: 16
PIF_VALUE: 19
PIF_VALUE: 24
PIF_VALUE: 15
PIF_VALUE: 3
PIF_VALUE: 23
PIF_VALUE: 21
PIF_VALUE: 23
PIF_VALUE: 21
PIF_VALUE: 23
PIF_VALUE: 16
PIF_VALUE: 21
PIF_VALUE: 22
PIF_VALUE: 1
PIF_VALUE: 20
PIF_VALUE: 21
PIF_VALUE: 20
PIF_VALUE: 33
PIF_VALUE: 21
PIF_VALUE: 23
PIF_VALUE: 22
PIF_VALUE: 21
PIF_VALUE: 21
PIF_VALUE: 1
PIF_VALUE: 1
PIF_VALUE: 23
PIF_VALUE: 26
PIF_VALUE: 23
PIF_VALUE: 22
PIF_VALUE: 15
PIF_VALUE: 1
PIF_VALUE: 21
PIF_VALUE: 16
PIF_VALUE: 18
PIF_VALUE: 12
PIF_VALUE: 22
PIF_VALUE: 23
PIF_VALUE: 1
PIF_VALUE: 22
PIF_VALUE: 22
PIF_VALUE: 24
PIF_VALUE: 25
PIF_VALUE: 22
PIF_VALUE: 21
PIF_VALUE: 21
PIF_VALUE: 17
PIF_VALUE: 22
PIF_VALUE: 12
PIF_VALUE: 22
PIF_VALUE: 18
PIF_VALUE: 5
PIF_VALUE: 22
PIF_VALUE: 21
PIF_VALUE: 20
PIF_VALUE: 20
PIF_VALUE: 21
PIF_VALUE: 15
PIF_VALUE: 21
PIF_VALUE: 1
PIF_VALUE: 16
PIF_VALUE: 26
PIF_VALUE: 23
PIF_VALUE: 22
PIF_VALUE: 22

## 2021-08-12 ASSESSMENT — PAIN SCALES - GENERAL
PAINLEVEL_OUTOF10: 0

## 2021-08-12 ASSESSMENT — PAIN - FUNCTIONAL ASSESSMENT: PAIN_FUNCTIONAL_ASSESSMENT: 0-10

## 2021-08-12 NOTE — DISCHARGE INSTR - COC
Continuity of Care Form    Patient Name: Miguel Carter   :  1955  MRN:  [id number]    Admit date:  2021  Discharge date:  21    Code Status Order: Full Code   Advance Directives:   Advance Care Flowsheet Documentation       Date/Time Healthcare Directive Type of Healthcare Directive Copy in [address] Agent's Name Healthcare Agent's Phone Number    [phone]  No, patient does not have an advance directive for healthcare treatment declined information at this time  --  --  --  --  --            Admitting Physician:  Toño Recio MD  PCP: Erin Mckeon MD    Discharging Nurse: [facility] Unit/Room#: [facility] Phone Number: [phone]    Emergency Contact:   Extended Emergency Contact Information  Primary Emergency Contact: Luis F Deluca  Address: [address] [place] [address] Gertruderuben Baker  [address] Phone: [phone]  Work Phone: [phone]  Mobile Phone: [phone]  Relation: Spouse  Hearing or visual needs: None  Other needs: None  Preferred language: English   needed? No  Secondary Emergency Contact: Rebecca Coulter  Address: [address] [facility] [address] [address] [place] [address] Gertrude Baker  [address] Phone: [phone]  Work Phone: [phone]  Mobile Phone: [phone]  Relation: Parent  Hearing or visual needs: None  Other needs: None  Preferred language: English   needed?  No    Past Surgical History:  Past Surgical History:   Procedure Laterality Date    CHOLECYSTECTOMY, LAPAROSCOPIC      COLONOSCOPY  2009    small hemorrhoids    COLONOSCOPY  2002    hemohhoids, hyperplastic polyp, right colon pathology-chronic inflammation in lumina propria with focal actue cryptitis consistent with active colitis    COLONOSCOPY  2016    flat sigmoid [id number]    COLONOSCOPY  2017    NO RECURRENT POLYPS SEEN possible.  Ambulation with a rolling walker. Activity & Diet:   Therapy performed daily. (Instruct patient to take pain meds. 1 hour prior to therapy.)   Up in chair for all meals and majority of day.  Range of motion for TKA patients.  Hip precautions for BRYANT patients.  Incentive Spirometer: 3- 4 inhalations every twenty minutes, during the day, while awake, the first week home after discharge   Increase oral intake of fruits, fiber and water and take a daily stool softener to prevent constipation.  Consider stronger bowel protocol if no bowel movement is achieved after three days home.  Increase protein intake and reduce sugar intake to promote healing and prevent infection.  No pillow under the knee for TKA patients. [address] go on in the a.m. and come off in the p.m. (Hand wash them every two or three days, roll in towel to remove most of moisture, and hang to dry.)    Incision Care:   If patient has ACE bandage it may be removed POD #2   Keep incisional dressing intact until seen and removed by surgeon, unless saturated, in which case, call surgeon and request instructions.  If dressing falls off, call surgeon.  If using the Prevena dressing, with battery pack, place battery pack in waterproof bag during shower. If using Aquacel dressing then dressing is waterproof and patient may shower.  If patient has a Prevena remove on POD #5 and replace with Aquacel dressing (patient was provided with dressing in hospital)   Elevated the leg and ice the affected area four times a day, for twenty minutes each time and after every physical therapy session. Normal Conditions - Will improve with provided comfort measures and time:   Some swelling in the operative leg is normal - this should reduce over time.  Some post-operative pain is normal.  This will improve with prescribed medication, provided comfort measures and time.      Constipation related to pain medications & decreased Electronically signed by Екатерина Leyva RN on 8/12/21 at 12:22 PM EDT    PHYSICIAN SECTION    Prognosis: Good    Condition at Discharge: Stable    Rehab Potential (if transferring to Rehab): Good    Recommended Labs or Other Treatments After Discharge: see above    Physician Certification: I certify the above information and transfer of Miguel Carter  is necessary for the continuing treatment of the diagnosis listed and that she requires Home Care for less 30 days.      Update Admission H&P: No change in H&P    PHYSICIAN SIGNATURE:  Electronically signed by Toño Recio MD on 8/12/21 at 7:21 AM EDT

## 2021-08-12 NOTE — TELEPHONE ENCOUNTER
Patient had surgery this morning and you wrote a script for percocet.  Patient at discharge stated she did not want percocet it makes her loopy and would like Norco.    Please send script to [facility] so patient can get filled before leaving the hospital.

## 2021-08-12 NOTE — PROGRESS NOTES
[address]    Occupational Therapy Evaluation  Date: 21  Patient Name: Jeff Shannon       Room: 2932/7796-37  MRN: [id number]  Account: [de-identified]   : 1955  (76 y.o.) Gender: female     Discharge Recommendations:  Further Occupational Therapy is recommended upon facility discharge. Equipment Needed:  (TBD)    Referring Practitioner: Omar Fox MD  Diagnosis: L knee OA       Treatment Diagnosis: Impaired self care status  Past Medical History:  has a past medical history of Arthritis, Bleb, lung ([address] Colon polyps, COPD (chronic obstructive pulmonary disease) ([address] GERD (gastroesophageal reflux disease), Heart palpitations, History of colon polyps, Hyperlipidemia, Hyperplastic colon polyp, Hypertension, Kidney infection, Pneumothorax, and Supraventricular tachycardia ([address] Past Surgical History:   has a past surgical history that includes Tonsillectomy and adenoidectomy; Colonoscopy (2009); Dilation & curettage; Cholecystectomy, laparoscopic (); Gynecologic cryosurgery (); Colonoscopy (2002); sigmoidoscopy (); Colonoscopy (2016); pr colsc flx w/removal lesion by hot bx forceps (N/A, 2017); Colonoscopy (2017); Knee arthroscopy (Left, 10/6/2020); and Total knee arthroplasty (Left, 2021).     Restrictions  Restrictions/Precautions: Fall Risk, Weight Bearing, Surgical Protocols  Implants present? : Metal implants (L TKA)  Other position/activity restrictions: L TKA precautions  Left Lower Extremity Weight Bearing: Weight Bearing As Tolerated  Required Braces or Orthoses?: No     Vitals  Temp: 99 °F (37.2 °C)  Pulse: 88  Resp: 16  BP: 123/79  Height: 5' 6\" (167.6 cm)  Weight: 195 lb (88.5 kg)  BMI (Calculated): 31.5  Oxygen Therapy  SpO2: 92 %  Pulse Oximeter Device Mode: Continuous  Pulse Oximeter Device Location: Left, Hand, Finger  O2 Device: None (Room air)  O2 Flow Rate (L/min): 8 L/min  Level of Consciousness: Alert (0)    Subjective  Subjective: Pt resting in bed upon arrival. Pt was pleasant and agreeable for OT/PT eval.   Comments: Ok per [employer] for OT/PT eval  Overall Orientation Status: Within Functional Limits  Vision  Vision: Impaired  Vision Exceptions: Wears glasses for reading  Hearing  Hearing: Within functional limits  Social/Functional History  Lives With: Spouse  Type of Home: House  Home Layout: One level  Home Access: Ramped entrance, Stairs to enter with rails  Entrance Stairs - Number of Steps: 2 steps to enter from garage and the front  Entrance Stairs - Rails: Right  Bathroom Shower/Tub: Walk-in shower, Doors  Bathroom Toilet: Standard  Bathroom Equipment: Toilet raiser (with handrails)  Bathroom Accessibility: Accessible  Home Equipment: Cane, Crutches, Reacher  ADL Assistance: Independent  Homemaking Assistance: Independent  Homemaking Responsibilities: Yes  Ambulation Assistance: Independent  Transfer Assistance: Independent  Active : Yes  Mode of Transportation: Car  Occupation: Part time employment, Works at home  Type of occupation: Realtor  IADL Comments: Pt reports sleeping in flat bed  Additional Comments: Pt reports that  is retired and is able to provide assistance as needed. Pt has h/o PT for L knee and is aware of ther exs program       Objective          Sensation  Overall Sensation Status: Impaired (left foot numbness)   ADL  Feeding: Modified independent   Grooming: Setup  UE Bathing: Setup  LE Bathing: Minimal assistance  UE Dressing: Setup  LE Dressing: Minimal assistance  Toileting: Contact guard assistance  Additional Comments: ADL scores based on clinical reasoning and skilled observation unless otherwise noted. Pt completed upper body dressing with set-up assistance. Pt able to don underwear and pants with increased time bending fowards to thread over feet. Pt educated on dressing surgical side first with good return demo.  Pt required assistance with donning slippers brian to increased pain while bending to reach. Pt educated on use of reacher to assist with dressing task with pt verbalizing understanding. Pt educated on a sock aide to increase independence with donning socks with pt verbalizing understanding. Pt currently limited due to decreased balance, pain, and ROM in L LE impacting safety and independence with self care tasks. UE Function           LUE Strength  Gross LUE Strength: WFL  L Hand General: 4+/5  LUE Strength Comment: Grossly 4+/5     LUE Tone: Normotonic     LUE AROM (degrees)  LUE AROM : WFL     Left Hand AROM (degrees)  Left Hand AROM: WFL  RUE Strength  Gross RUE Strength: WFL  R Hand General: 4+/5  RUE Strength Comment: Grossly 4+/5      RUE Tone: Normotonic     RUE AROM (degrees)  RUE AROM : WFL     Right Hand AROM (degrees)  Right Hand AROM: WFL    Fine Motor Skills  Coordination  Movements Are Fluid And Coordinated: Yes                           Mobility  Supine to Sit: Stand by assistance       Balance  Sitting Balance: Modified independent   Standing Balance: Contact guard assistance  Standing Balance  Time: 4-5 minutes  Activity: functional mobility, lower body dressing, stairs  Comment: with RW  Functional Mobility  Functional - Mobility Device: Rolling Walker  Activity: To/From therapy gym  Assist Level: Contact guard assistance  Functional Mobility Comments: Verbal cues for hand placement and safety with RW.   Bed mobility  Supine to Sit: Stand by assistance  Scooting: Stand by assistance  Comment: Pt sat up into long sitting and advanced legs towards EOB with use of bed rails. Pt reports that her  is home and able to provide assistance as needed. Pt educated on use of gait belt or sheet to assist with lifting LLE onto and off of the bed.       Transfers  Sit to stand: Contact guard assistance  Stand to sit: Contact guard assistance  Transfer Comments: Verbal cues for hand placement and safety  Functional Activity Tolerance  Functional Activity Tolerance: Tolerates 30 min exercise with multiple rests   Assessment  Performance deficits / Impairments: Decreased ADL status, Decreased functional mobility , Decreased ROM, Decreased strength, Decreased endurance, Decreased balance, Decreased high-level IADLs  Treatment Diagnosis: Impaired self care status  Prognosis: Good  Decision Making: Medium Complexity  REQUIRES OT FOLLOW UP: Yes  Discharge Recommendations: Patient would benefit from continued therapy after discharge  Activity Tolerance: Patient Tolerated treatment well, Patient limited by pain         Functional Outcome Measures  AM-PAC Daily Activity Inpatient   How much help for putting on and taking off regular lower body clothing?: A Little  How much help for Bathing?: A Little  How much help for Toileting?: A Little  How much help for putting on and taking off regular upper body clothing?: A Little  How much help for taking care of personal grooming?: A Little  How much help for eating meals?: None  [facility] Inpatient Daily Activity Raw Score: 19  AM-PAC Inpatient ADL T-Scale Score : 40.22  ADL Inpatient CMS 0-100% Score: 42.8  ADL Inpatient CMS G-Code Modifier : CK       Goals  Patient Goals   Patient goals :  To go home  Short term goals  Time Frame for Short term goals: By discharge  Short term goal 1: Pt will complete lower body dressing with SBA and good safety  Short term goal 2: Pt will complete functional transfers/mobility during self care tasks with SBA and good safety   Short term goal 3: Pt will tolerate standing 8+ minutes of functional activities of choice with good safety and SBA  Short term goal 4: Pt will verbalize/demonstrate good understanding of home safety/fall prevention to increase safety and independence with self care tasks  Short term goal 5: Pt will participate in 15+ minutes of therapeutic exercises/functional activities to increase safety and independence with self care tasks    Plan  Safety Devices  Safety Devices in place: Yes  Type of devices:  All fall risk precautions in place, Call light within reach, Gait belt, Patient at risk for falls, Left in chair, Nurse notified     Plan  Times per week: 5-7 (1-2 times a day)  Current Treatment Recommendations: Self-Care / ADL, Strengthening, Balance Training, Functional Mobility Training, Endurance Training, Pain Management, Safety Education & Training, Patient/Caregiver Education & Training, Equipment Evaluation, Education, & procurement, Home Management Training       Equipment Recommendations  Equipment Needed:  (TBD)  OT Individual Minutes  Time In: 1306  Time Out: 3352  Minutes: 55    Electronically signed by Alec Bhatti OT on 8/12/21 at 3:57 PM EDT

## 2021-08-12 NOTE — ANESTHESIA POSTPROCEDURE EVALUATION
Department of Anesthesiology  Postprocedure Note    Patient: Inocente Maharaj  MRN: [id number]  YOB: 1955  Date of evaluation: 8/12/2021  Time:  12:16 PM     Procedure Summary     Date: 08/12/21 Room / Location: [address] [place] [address]  / [facility]     Anesthesia Start: 0730 Anesthesia Stop: 9161    Procedure: KNEE TOTAL ARTHROPLASTY (Left Knee) Diagnosis:       (DEGENERATIVE JOINT DISEASE LEFT KNEE)      (PT VACCINATED)    Surgeons: Dario Monroy MD Responsible Provider: Sarika Woody MD    Anesthesia Type: general, regional ASA Status: 2          Anesthesia Type: general, regional    Nicholas Phase I: Nicholas Score: 10    Nicholas Phase II:      Last vitals: Reviewed and per EMR flowsheets.        Anesthesia Post Evaluation    Comments: POST- ANESTHESIA EVALUATION       Pt Name: Inocente Maharaj  MRN: [id number]  YOB: 1955  Date of evaluation: 8/12/2021  Time:  12:16 PM      /63   Pulse 84   Temp 98.4 °F (36.9 °C) (Oral)   Resp 15   Ht 5' 6\" (1.676 m)   Wt 195 lb (88.5 kg)   LMP 06/26/1997   SpO2 91%   BMI 31.47 kg/m²      Consciousness Level  Awake  Cardiopulmonary Status  Stable  Pain Adequately Treated YES  Nausea / Vomiting  NO  Adequate Hydration  YES  Anesthesia Related Complications NONE      Electronically signed by Sarika Woody MD on 8/12/2021 at 12:16 PM

## 2021-08-12 NOTE — H&P
HISTORY and [address]       NAME:  Renetta Hanson  MRN: [id number]   YOB: 1955   Date: 8/12/2021   Age: 77 y.o. Gender: female       COMPLAINT AND PRESENT HISTORY:   Renetta Hanson is 77 y.o.,  female, undergoing for left Knee Meniscus Tear. Patient will be having:   KNEE TOTAL ARTHROPLASTY - Left  Patient has been seeing Dr. Judie Varela, see office note 6/21/2021 below,   History of Present Illness: This is a 77 y.o. female who presents to the clinic today for evaluation / follow up of knee pain. Please see all prior dictations. Jayme Diamond is a 69-year-old female who was had a previous arthroscopy of her left knee. 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 She has had several injections in the most recently during my absence in April none of which is really given her much relief. She is at the point where her pain is progressive she has lots of crepitus and grinding. She can no longer do her walks. Daily activities become significantly restricted. She is now wishing to proceed with total knee arthroplasty.    notes reviewed with patient and she concurs. Patient had a schedule surgery on 7/20/2021 but was cancelled due to lack of cardiac clearance from abnormal EKG and stress test.   Patient has since obtained cardiac clearance now for this surgery. Patient has been free of injury. Patient voices that her condition is the same. Significant medical history:  HTN, HLD, COPD  No chest pain, palpitations or diaphoresis. No recent URI, SOB, fever or chills. Patient has been NPO since midnight. No blood thinners in the past 7 days. Patient took atenolol, Lisinopril and prilosec    Patient denies any personal or family problems with anesthesia.     PAST MEDICAL HISTORY     Past Medical History:   Diagnosis Date    Arthritis     Bleb, lung ([address] causes  pneumothorax     Colon polyps 11/18/2017    COPD (chronic obstructive pulmonary disease) (HCC)     GERD (gastroesophageal reflux disease)     Heart palpitations     History of colon polyps 2002    Hyperlipidemia     Hyperplastic colon polyp 2016    Hypertension     Kidney infection 2004    Pneumothorax     spontaneous x 2, chest tube    Supraventricular tachycardia ([address]     first episode about 20 years ago, well controlled now with Atenolol       SURGICAL HISTORY       Past Surgical History:   Procedure Laterality Date    CHOLECYSTECTOMY, LAPAROSCOPIC  2010    COLONOSCOPY  9/2009    small hemorrhoids    COLONOSCOPY  7/2002    hemohhoids, hyperplastic polyp, right colon pathology-chronic inflammation in lumina propria with focal actue cryptitis consistent with active colitis    COLONOSCOPY  02/17/2016    flat sigmoid vhxpq-yecksnybu-gyzgfhjdnohu    COLONOSCOPY  11/18/2017    NO RECURRENT POLYPS SEEN AT THE PREVIOUS POLYPECTOMY SITE.  hOWEVER IN THE SIGMOID COLON AT ABOUT 50 CM FROM THE ANUS FLAT POLYP SEEN. , PATHOLOGY-- LARGE INTESTINAL TYPE MUCOSA WITH MIXED FEATURES OF     DILATION AND CURETTAGE      AUB    GYNECOLOGIC CRYOSURGERY  29's    KNEE ARTHROSCOPY Left 10/6/2020    KNEE ARTHROSCOPY PARTIAL MEDIAL MENISCECTOMY performed by Vito Flores MD at [facility] W/REMOVAL LESION BY  [facility] N/A 11/18/2017    COLONOSCOPY POLYPECTOMY HOT BIOPSY, COLD BIOPSY, APC SIGMOID, SPOT MARKING SIGMOID performed by Julio Barboza MD at [facility]  2003    hemorrhoids, no colitis seen    TONSILLECTOMY AND ADENOIDECTOMY      child        FAMILY HISTORY       Family History   Problem Relation Age of Onset    Cancer Father 28        bone sarcoma    Hypertension Mother     Other Mother         peripheral neuropathy    Uterine Cancer Maternal Aunt     Breast Cancer Other         dx first before age 48 and again @ 77    Colon Cancer Maternal Aunt         all dx before age 48    Colon Cancer Maternal Uncle         1 uncle in 42's dx, other in 68's    Colon Cancer Other         multiple cousins dx before age 48   [facility] Uterine Cancer Paternal Aunt         dx after    Ovarian Cancer Paternal Aunt         dx after age 48   [facility] Lung Cancer Paternal Aunt         dx after 48    Colon Cancer Paternal Grandfather         dx after age 48    Colon Cancer Maternal Grandmother 55    Liver Cancer Maternal Grandmother     Diabetes Neg Hx     Eclampsia Neg Hx      Labor Neg Hx     Spont Abortions Neg Hx     Stroke Neg Hx     Depression Neg Hx        SOCIAL HISTORY       Social History     Socioeconomic History    Marital status:      Spouse name: Michael Campos Number of children: 2    Years of education: Not on file    Highest education level: Associate degree: occupational, technical, or vocational program   Occupational History    Not on file   Tobacco Use    Smoking status: Former Smoker     Packs/day: 1.00     Years: 20.00     Pack years: 20.00     Types: Cigarettes     Quit date: 9/15/1996     Years since quittin.9    Smokeless tobacco: Never Used   Vaping Use    Vaping Use: Never used   Substance and Sexual Activity    Alcohol use: Yes     Alcohol/week: 0.0 standard drinks     Comment: occasional    Drug use: No    Sexual activity: Yes     Partners: Male     Birth control/protection: Surgical     Comment:  vasectomy   Other Topics Concern    Not on file   Social History Narrative    Not on file     Social Determinants of Health     Financial Resource Strain: Low Risk     Difficulty of Paying Living Expenses: Not hard at all   Food Insecurity: No Food Insecurity    Worried About Running Out of Food in the Last Year: Never true    [address] in the Last Year: Never true   Transportation Needs: No Transportation Needs    Lack of Transportation (Medical): No    Lack of Transportation (Non-Medical):  No   Physical Activity: Sufficiently Active    Days of Exercise per Week: 5 days    Minutes of Exercise per Session: 50 min   Stress: No Stress Concern Present    Feeling of Stress : Only a little   Social Connections: Moderately Isolated    Frequency of Communication with Friends and Family: More than three times a week    Frequency of Social Gatherings with Friends and Family: Three times a week    Attends [employer] Services: Never    Active Member of Clubs or Organizations: No    Attends Club or Organization Meetings: Not asked    Marital Status:    Intimate Partner Violence: Not At Risk    Fear of Current or Ex-Partner: No    Emotionally Abused: No    Physically Abused: No    Sexually Abused: No           REVIEW OF SYSTEMS      Allergies   Allergen Reactions    Avelox [Moxifloxacin]     Biaxin [Clarithromycin] Hives    Epinephrine Other (See Comments)     Increased BP, Tachycardia    Neosporin [Neomycin-Polymyxin-Gramicidin]     Nickel     Sulfa Antibiotics     Adhesive Tape Rash    Phenylephrine Rash       No current facility-administered medications on file prior to encounter. Current Outpatient Medications on File Prior to Encounter   Medication Sig Dispense Refill    omeprazole (PRILOSEC) 20 MG delayed release capsule Take 20 mg by mouth Daily      Probiotic Product (PROBIOTIC DAILY PO) Take by mouth daily      ibuprofen (ADVIL;MOTRIN) 800 MG tablet Take 1 tablet by mouth 2 times daily as needed for Pain 60 tablet 5    Multiple Vitamins-Minerals (THERAPEUTIC MULTIVITAMIN-MINERALS) tablet Take 1 tablet by mouth daily      Turmeric 500 MG CAPS Take by mouth daily      ciprofloxacin-dexamethasone (CIPRODEX) 0.3-0.1 % otic suspension Instill 1-2 drops into each ear as directed 1 Bottle 2    Cholecalciferol (VITAMIN D PO) Take 1 tablet by mouth daily         Negative except for what is mentioned in the HPI. GENERAL PHYSICAL EXAM     Vitals :   See vital signs in RN flow sheet.        GENERAL APPEARANCE: Ro Han is 77 y.o.,  female, moderately obese, nourished, conscious, alert. Does not appear to be distress or pain at this time. SKIN:  Warm, dry, no cyanosis or jaundice. HEAD:  Normocephalic, atraumatic, no swelling or tenderness. EYES:  Pupils equal, reactive to light. EARS:  No discharge, no marked hearing loss. NOSE:  No rhinorrhea, epistaxis or septal deformity. THROAT:  Not congested. No ulceration bleeding or discharge. NECK:  No stiffness, trachea central.  No palpable masses or L.N.                 CHEST:  Symmetrical and equal on expansion. HEART:  RRR S1 > S2. No audible murmurs or gallops. LUNGS:  Equal on expansion, normal breath sounds. No adventitious sounds. ABDOMEN:  Obese. Soft on palpation. No dysphagia, No localized tenderness. No guarding or rigidity. No palpable hepatosplenomegaly. LYMPHATICS:  No palpable cervical lymphadenopathy. LOCOMOTOR, BACK AND SPINE:  No tenderness or deformities. EXTREMITIES:  Symmetrical, no pretibial edema. Katinas sign negative. No discoloration or ulcerations. NEUROLOGIC:  The patient is conscious, alert, oriented,Cranial nerve II-XII intact, taste and smell were not examined. No apparent focal sensory or motor deficits.              PROVISIONAL DIAGNOSES / SURGERY:      DDD LEFT KNEE    KNEE TOTAL ARTHROPLASTY - Left      Patient Active Problem List    Diagnosis Date Noted    Colon polyps 11/18/2017    Diarrhea 09/27/2017    Hyperplastic colon polyp     Change in bowel function 02/10/2016    Rectal bleeding 02/10/2016    History of colon polyps     COPD (ch[facility] obstructive pulmonary disease) [facility]     Hyperlipidemia     Heart palpitations            LORNA Winters - CNP on 8/12/2021 at 5:52 AM

## 2021-08-12 NOTE — CARE COORDINATION
Joint Replacement Discharge Planning Note:    Admission Date:  8/12/2021 Chiki Payne is a 77 y.o.  female    Admitted for : Primary osteoarthritis of left knee [M17.12]    Met with:  Patient and spouse, Keshav Avalos    PCP:  Morris Hsieh MD              Insurance:  Medicare    Current Residence/ Living Arrangements:  independently at home with spouse             Current Services PTA:  No    Is patient agreeable to [address]: Yes    Is patient agreeable to outpatient physical therapy:  eventually    Freedom of choice provided: Yes         [address]/Outpatient Therapy chosen:  [facility]. Referral faxed and notified Gloria from [place] of this. Potential [facility] needed: No    Current home DME:  none    Pharmacy:  [facility] in [facility]    Does Patient want to use MEDS to BEDS?([facility] only) Yes    Transportation Provider:  Patient and Family                       Discharge Plan:   Patient intends to discharge to Home    Patient needs a wheeled walker. Order for walker faxed to [facility] [place] and asked for delivery today for discharge home. Anticipated discharge date 8/12/2021      Readmission Risk              Risk of Unplanned Readmission:  0           Electronically signed by: Fox Levy RN on 8/12/2021 at 12:27 PM    Notified Gloria from [place] that pt will be discharged home today.     Electronically signed by Fox Levy RN on 8/12/2021 at 3:58 PM

## 2021-08-12 NOTE — ANESTHESIA PRE PROCEDURE
Department of Anesthesiology  Preprocedure Note       Name:  Teresa Martínez   Age:  77 y.o.  :  1955                                          MRN:  [id number]         Date:  2021      Surgeon: Robe Cabello):  Alonzo Easton MD    Procedure: Procedure(s):  KNEE TOTAL ARTHROPLASTY    Medications prior to admission:   Prior to Admission medications    Medication Sig Start Date End Date Taking? Authorizing Provider   lisinopril (PRINIVIL;ZESTRIL) 20 MG tablet Take 20 mg by mouth as needed Just for day of surgery   Yes Historical Provider, MD   oxyCODONE-acetaminophen (PERCOCET) 5-325 MG per tablet Take 1-2 tablets by mouth every 4 hours as needed for Pain for up to 7 days.  21 Yes Alonzo Easton MD   aspirin EC 81 MG EC tablet Take 1 tablet by mouth 2 times daily 21  Yes Alonzo Easton MD   lisinopril-hydroCHLOROthiazide FRANK [facility]) 20-12.5 MG per tablet Take one tablet daily 21  Yes Kumar Hernandez MD   atenolol (TENORMIN) 25 MG tablet TAKE 1 TABLET BY MOUTH TWICE DAILY 21  Yes Kumar Hernandez MD   omeprazole (PRILOSEC) 20 MG delayed release capsule Take 20 mg by mouth Daily   Yes Historical Provider, MD   ciprofloxacin-dexamethasone (CIPRODEX) 0.3-0.1 % otic suspension Instill 1-2 drops into each ear as directed 20  Yes Kumar Hernandez MD   Probiotic Product (PROBIOTIC DAILY PO) Take by mouth daily    Historical Provider, MD   Multiple Vitamins-Minerals (THERAPEUTIC MULTIVITAMIN-MINERALS) tablet Take 1 tablet by mouth daily    Historical Provider, MD   Turmeric 500 MG CAPS Take by mouth daily    Historical Provider, MD   Cholecalciferol (VITAMIN D PO) Take 1 tablet by mouth daily    Historical Provider, MD       Current medications:    Current Facility-Administered Medications   Medication Dose Route Frequency Provider Last Rate Last Admin    lactated ringers infusion   Intravenous Continuous Mc Fenton  mL/hr at 21 2046 New Bag at 08/12/21 4456    sodium chloride flush 0.9 % injection 10 mL  10 mL Intravenous PRN Magy Cabrera MD        0.9 % sodium chloride infusion  25 mL Intravenous PRN Hilary Perez MD        ceFAZolin (ANCEF) 2000 mg in dextrose 5 % 50 mL IVPB  2,000 mg Intravenous On Call to  Mariza Teran MD        scopolamine (TRANSDERM-SCOP) transdermal patch 1 patch  1 patch Transdermal Once Kem Glass MD   1 patch at 08/12/21 0616       Allergies:     Allergies   Allergen Reactions    Avelox [Moxifloxacin]     Biaxin [Clarithromycin] Hives    Epinephrine Other (See Comments)     Increased BP, Tachycardia    Neosporin [Neomycin-Polymyxin-Gramicidin]     Nickel     Sulfa Antibiotics     Adhesive Tape Rash    Phenylephrine Rash       Problem List:    Patient Active Problem List   Diagnosis Code    Hyperlipidemia E78.5    Heart palpitations R00.2    COPD (chronic obstructive pulmonary disease) ([address] J44.9    History of colon polyps Z86.010    Change in bowel function R19.8    Rectal bleeding K62.5    Hyperplastic colon polyp K63.5    Diarrhea R19.7    Colon polyps K63.5    Primary osteoarthritis of left knee M17.12       Past Medical History:        Diagnosis Date    Arthritis     Bleb, lung ([address]     causes  pneumothorax     Colon polyps 11/18/2017    COPD (chronic obstructive pulmonary disease) (HCC)     GERD (gastroesophageal reflux disease)     Heart palpitations     History of colon polyps 2002    Hyperlipidemia     Hyperplastic colon polyp 2016    Hypertension     Kidney infection 2004    Pneumothorax     spontaneous x 2, chest tube    Supraventricular tachycardia ([address]     first episode about 20 years ago, well controlled now with Atenolol       Past Surgical History:        Procedure Laterality Date    CHOLECYSTECTOMY, LAPAROSCOPIC  2010    COLONOSCOPY  9/2009    small hemorrhoids    COLONOSCOPY  7/2002    hemohhoids, hyperplastic polyp, right colon pathology-chronic inflammation in lumina propria with focal actue cryptitis consistent with active colitis    COLONOSCOPY  2016    flat sigmoid fggwe-ojcaodivn-eubananpqcoy    COLONOSCOPY  2017    NO RECURRENT POLYPS SEEN AT THE PREVIOUS POLYPECTOMY SITE.  hOWEVER IN THE SIGMOID COLON AT ABOUT 50 CM FROM THE ANUS FLAT POLYP SEEN. , PATHOLOGY-- LARGE INTESTINAL TYPE MUCOSA WITH MIXED FEATURES OF     DILATION AND CURETTAGE      AUB    GYNECOLOGIC CRYOSURGERY  30's    KNEE ARTHROSCOPY Left 10/6/2020    KNEE ARTHROSCOPY PARTIAL MEDIAL MENISCECTOMY performed by Juan Duarte MD at [facility] W/REMOVAL LESION BY  [facility] N/A 2017    COLONOSCOPY POLYPECTOMY HOT BIOPSY, COLD BIOPSY, APC SIGMOID, SPOT MARKING SIGMOID performed by Gage Harrison MD at [address] [place] [address]      hemorrhoids, no colitis seen    TONSILLECTOMY AND ADENOIDECTOMY      child        Social History:    Social History     Tobacco Use    Smoking status: Former Smoker     Packs/day: 1.00     Years: 20.00     Pack years: 20.00     Types: Cigarettes     Quit date: 9/15/1996     Years since quittin.9    Smokeless tobacco: Never Used   Substance Use Topics    Alcohol use:  Yes     Alcohol/week: 0.0 standard drinks     Comment: occasional                                Counseling given: Not Answered      Vital Signs (Current):   Vitals:    21 0556   BP: (!) 143/77   Pulse: 57   Resp: 16   Temp: 97.1 °F (36.2 °C)   TempSrc: Infrared   SpO2: 98%   Weight: 195 lb (88.5 kg)   Height: 5' 6\" (1.676 m)                                              BP Readings from Last 3 Encounters:   21 (!) 143/77   21 122/88   21 110/60       NPO Status: Time of last liquid consumption:                         Time of last solid consumption:                         Date of last liquid consumption: 21                        Date of last solid food consumption: 21    BMI:   Wt Readings from Last 3 Encounters:   08/12/21 195 lb (88.5 kg)   08/05/21 195 lb (88.5 kg)   07/16/21 195 lb (88.5 kg)     Body mass index is 31.47 kg/m². CBC:   Lab Results   Component Value Date    WBC 6.8 07/02/2021    RBC 4.40 07/02/2021    RBC 4.25 01/24/2012    HGB 13.6 07/02/2021    HCT 41.9 07/02/2021    MCV 95.2 07/02/2021    RDW 12.7 07/02/2021     07/02/2021     01/24/2012       CMP:   Lab Results   Component Value Date     07/02/2021    K 4.7 07/02/2021     07/02/2021    CO2 27 07/02/2021    BUN 24 07/02/2021    CREATININE 0.81 07/02/2021    GFRAA >60 07/02/2021    LABGLOM >60 07/02/2021    GLUCOSE 94 07/02/2021    GLUCOSE 150 01/25/2012    PROT 6.9 08/31/2018    CALCIUM 8.9 07/02/2021    BILITOT 0.29 08/31/2018    ALKPHOS 69 08/31/2018    AST 16 08/31/2018    ALT 18 08/31/2018       POC Tests: No results for input(s): POCGLU, POCNA, POCK, POCCL, POCBUN, POCHEMO, POCHCT in the last 72 hours.     Coags:   Lab Results   Component Value Date    PROTIME 10.0 08/31/2018    INR 1.0 08/31/2018    APTT 25.7 08/31/2018       HCG (If Applicable): No results found for: PREGTESTUR, PREGSERUM, HCG, HCGQUANT     ABGs: No results found for: PHART, PO2ART, IYU0ZAF, KZB3LUU, BEART, Y8SIVEJV     Type & Screen (If Applicable):  No results found for: LABABO, LABRH    Drug/Infectious Status (If Applicable):  Lab Results   Component Value Date    HEPCAB NONREACTIVE 10/13/2018       COVID-19 Screening (If Applicable):   Lab Results   Component Value Date    COVID19 Not Detected 10/02/2020           Anesthesia Evaluation  Patient summary reviewed and Nursing notes reviewed no history of anesthetic complications:   Airway: Mallampati: II  TM distance: >3 FB   Neck ROM: full  Mouth opening: > = 3 FB Dental:          Pulmonary:normal exam  breath sounds clear to auscultation  (+) COPD:                             Cardiovascular:    (+) hypertension:, dysrhythmias: SVT,       ECG reviewed  Rhythm: regular  Rate: normal                 ROS comment: Normal sinus rhythm  Nonspecific ST and T wave abnormality  Abnormal ECG     Neuro/Psych:   Negative Neuro/Psych ROS              GI/Hepatic/Renal:   (+) GERD:,           Endo/Other:    (+) : arthritis: OA., .                 Abdominal:             Vascular: negative vascular ROS. Other Findings:             Anesthesia Plan      general and regional     ASA 2     (Postop adductor canal block - r/b/a discussed including bleeding, infection, and nerve injury. Patient agrees to proceed with procedure.)  Induction: intravenous. MIPS: Postoperative opioids intended and Prophylactic antiemetics administered. Anesthetic plan and risks discussed with patient. Plan discussed with CRNA.                   Sylvia Antonio MD   8/12/2021

## 2021-08-12 NOTE — CARE COORDINATION
[address] Encounter Date/Time: 2021 Erik Gill Account: [de-identified]    MRN: [id number]    Patient: Jeff Shannon    Contact Serial #: [id number]      ENCOUNTER          Patient Class: OP in a bed Private Enc? No Unit RM BD: [address]    Hospital Service: Med/Surg   Encounter DX: Primary osteoarthritis o*   ADM Provider: Omar Fox MD   Procedure: [place] TOTAL KNEE ARTHROPLAS*   ATT Provider: Omar Fox MD   REF Provider:        Admission DX: Primary osteoarthritis of left knee and DX codes: M17.12      PATIENT  Name: Jeff Shannon : 1955 (77 yrs)   Address: [facility] Sex: Female   City: [address] [place] [address]         Marital Status:    Employer: [employer]         Gnosticism: Adventist   Primary Care Provider: Radha Valadez MD         Primary Phone: [phone]   EMERGENCY CONTACT   Contact Name Legal Guardian? Relationship to Patient Home Phone Work Phone   1. Luis F Deluca  2. Rebecca Coulter No  No Spouse  Parent [phone]         GUARANTOR            Guarantor: Jeff Shannon     : 1955   Address: [place] [address] Sex: Female     [place] [address]     Relation to Patient: Self       Home Phone: [phone]   Guarantor ID: [id number]       Work Phone:     Guarantor Employer: [employer]         Status: PART TIME      COVERAGE        PRIMARY INSURANCE   Payor: MEDICARE Plan: MEDICARE PART A AND B   Payor Address: [facility],  [facility], [facility]       Group Number:   Insurance Type: INDEMNITY   Subscriber Name: Meera Shine : 1955   Subscriber ID: [id number] Sarika Raines.  Rel. to Sub: Self   SECONDARY INSURANCE   Payor: UNITED HEALTHCARE Plan: OWM Address:  [facility] [id number], [place] [address]          Group Number: PLAN [id number] Insurance Type: INDEMNITY   Subscriber Name: Lillian Sorto Subscriber : 1955   Subscriber ID: [id number] Pat. Rel. to Sub: SELF          CSN                                    Req/Control # [Problem retrieving Specimen ID]                                   Order Date:  Aug 12, 2021  [id number]                                          Patient Information      Name:  Jeff Shannon  :  1955  Age:  77 y.o. Address:  [address] [place] [address]   Zip:  [address]  PCP: Radha Valadez MD Sex:  F  SSN: [id number]  Home Phone: [phone]  Work Phone:    Patient MRN:  [id number]    Alt Patient ID:  [id number]  PCP Phone: [phone]       Authorizing Provider Information       AUTHORIZING PROVIDER: Omar Fox MD  Physician ID: [id number]  NPI:  [id number]  Site:   Address: [address] [place] [address] Street: [address]  Phone: [phone]  Fax: [phone]              EQUIPMENT ORDERED  [facility]   #:   [id number]) Priority  Routine Class  Hospital Performed        Associated Diagnosis:  Primary osteoarthritis of left knee (M17.12 [ICD-10-CM])        Comments:    You must complete the order parameters below and add the medical necessity documentation for this DME in a separate note.     Folding Walker with Wheels     Current patient weight: Weight: 195 lb (88.5 kg)  Current patient height: Height: 5' 6\" (167.6 cm)  Diagnosis: osteoarthritis left knee, s/p left total knee replacement  Duration: Purchase            Scheduling Instructions:                                 Specimen Source             Collection Date    Collection Time    Order Status    Expected Date                Electronically Signed By  Omar Fox MD Date  Aug 12, 2021           Responsible Party Rachid Lima-ActID   Relationship Account Type Home Phone   [facility] A - 21* Gertrude Alberto  Self P/F [phone]   Employer   Work Phone             Insurance & Policy Baptiste Information     Primary Insurance  Insurance/Subscriber ID:  [id number]  Subscriber Name:  [facility] A              Relationship to Patient: Self     Secondary Insurance  Insurance/Subscriber ID: [id number]  Subscriber Name: Yoselin Montez  Relationship to Patient: Self  Signed ABN: N    Payor Name:  MEDICARE   Plan:  MEDICARE PART A AND B   Group:         Payor Name: UNITED HEALTHCARE  Plan:  [facility]  Group: PLAN [id number]  Worker's Comp Date of Injury:

## 2021-08-12 NOTE — PROGRESS NOTES
Physical Therapy    Facility/Department: [facility] MED SURG  Initial Assessment    NAME: Ina Payne  : 1955  MRN: [id number]    Date of Service: 2021    Discharge Recommendations:  Patient would benefit from continued therapy after discharge   PT Equipment Recommendations  Equipment Needed: Yes  Mobility Devices: Jewell Brooking: Rolling    Assessment   Body structures, Functions, Activity limitations: Decreased functional mobility ; Decreased balance;Decreased ROM; Decreased strength;Decreased safe awareness  Assessment: Good tolerance of session, no adverse response. Planned d/c tonight, but will cont per POC if pt continues to be hospitalized tomorrow  Treatment Diagnosis: impaired mobility s/p L TKA  Prognosis: Good  Decision Making: Medium Complexity  History: Patient is a 77 y.o. female with a long standing history of left DJD of the knee. Patient has failed all types of conservative treatment included physical therapy, weight-loss counseling, NSAIDS, and injections. The patient has significant restriction of activities of daily living and/or work/job place abilities, and, therefore, has been counseled for TKA. Exam: ROM, MMT, balance, social hx, mobility, HEP, positioning, ice schedule  Clinical Presentation: alert, oriented  PT Education: PT Role;Home Exercise Program;Precautions;Gait Training; Adaptive Device Training;General Safety;Weight-bearing Education; Functional Mobility Training;Energy Conservation;Transfer Training  Barriers to Learning: none  REQUIRES PT FOLLOW UP: Yes  Activity Tolerance  Activity Tolerance: Patient Tolerated treatment well       Patient Diagnosis(es): The encounter diagnosis was Primary osteoarthritis of left knee.      has a past medical history of Arthritis, Bleb, lung ([address] Colon polyps, COPD (chronic obstructive pulmonary disease) ([address] GERD (gastroesophageal reflux disease), Heart palpitations, History of colon polyps, Hyperlipidemia, Hyperplastic colon polyp, Hypertension, Kidney infection, Pneumothorax, and Supraventricular tachycardia ([facility] [address] has a past surgical history that includes Tonsillectomy and adenoidectomy; Colonoscopy (9/2009); Dilation & curettage; Cholecystectomy, laparoscopic (2010); Gynecologic cryosurgery (30's); Colonoscopy (7/2002); sigmoidoscopy (2003); Colonoscopy (02/17/2016); pr colsc flx w/removal lesion by hot bx forceps (N/A, 11/18/2017); Colonoscopy (11/18/2017); Knee arthroscopy (Left, 10/6/2020); and Total knee arthroplasty (Left, 8/12/2021). Restrictions  Restrictions/Precautions  Restrictions/Precautions: Fall Risk, Weight Bearing, Surgical Protocols  Required Braces or Orthoses?: No  Implants present? : Metal implants (L TKA)  Lower Extremity Weight Bearing Restrictions  Left Lower Extremity Weight Bearing: Weight Bearing As Tolerated  Position Activity Restriction  Other position/activity restrictions: L TKA precautions  Vision/Hearing  Vision: Impaired  Vision Exceptions: Wears glasses for reading  Hearing: Within functional limits     Subjective  General  Chart Reviewed: Yes  Patient assessed for rehabilitation services?: Yes  Additional Pertinent Hx: Pt underwent L TKA 8/12/21  Response To Previous Treatment: Not applicable  Family / Caregiver Present: No  Diagnosis: L knee OA  Follows Commands: Within Functional Limits  Subjective  Subjective: Pt is alert and resting in bed.  Pt is agreeable to PT/OT eval.  Pain Screening  Patient Currently in Pain: Denies  Vital Signs  Patient Currently in Pain: Denies  Pre Treatment Pain Screening  Intervention List: Patient able to continue with treatment    Orientation  Orientation  Overall Orientation Status: Within Functional Limits  Social/Functional History  Social/Functional History  Lives With: Spouse  Type of Home: House  Home Layout: One level  Home Access: Ramped entrance, Stairs to enter with rails  Entrance Stairs - Number of Steps: 2 steps to enter from garage and the front  Entrance Stairs - Rails: Right  Bathroom Shower/Tub: Walk-in shower, Doors  Bathroom Toilet: Standard  Bathroom Equipment: Toilet raiser (with handrails)  Bathroom Accessibility: Accessible  Home Equipment: Cane, Crutches, Reacher  ADL Assistance: Independent  Homemaking Assistance: Independent  Homemaking Responsibilities: Yes  Ambulation Assistance: Independent  Transfer Assistance: Independent  Active : Yes  Mode of Transportation: Car  Occupation: Part time employment, Works at home  Type of occupation: Realtor  IADL Comments: Pt reports sleeping in flat bed  Additional Comments: Pt reports that  is retired and is able to provide assistance as needed. Pt has h/o PT for L knee and is aware of ther exs program  Cognition        Objective     Observation/Palpation  Posture: Good  Observation: L LE ACE wrap, IV R UE  Edema: limited assessment d/t ACE wrap    AROM RLE (degrees)  RLE AROM: WFL  AROM LLE (degrees)  LLE General AROM: knee 0~60 degrees AROM while supine  AROM RUE (degrees)  RUE AROM : WFL  AROM LUE (degrees)  LUE AROM : WFL  Strength RLE  Strength RLE: WFL  Strength LLE  Comment: L knee ext 1/5, knee flex 2-/5  Strength RUE  Strength RUE: WFL  Strength LUE  Strength LUE: WFL     Sensation  Overall Sensation Status: Impaired  Light Touch: Partial deficits in the LLE (numbness in L foot)  Bed mobility  Supine to Sit: Stand by assistance  Scooting: Stand by assistance  Comment: uses bed rails for raising trunk. Pt states spouse can assist as needed.  Pt is educated on using sheet or belt to lift LE to floor  Transfers  Sit to Stand: Contact guard assistance  Stand to sit: Contact guard assistance  Comment: with RW  Ambulation  Ambulation?: Yes  WB Status: FWB L LE  More Ambulation?: No  Ambulation 1  Surface: level tile  Device: Rolling Walker  Assistance: Contact guard assistance  Quality of Gait: ambulates with L knee extended, no knee flex during swing phase, good heel strike, no antalgic gait pattern noted  Distance: 125 ft x2  Stairs/Curb  Stairs?: Yes  Stairs  # Steps : 3  Stairs Height: 4\"  Rails: Bilateral  Device: No Device  Assistance: Contact guard assistance  Comment: Pt can use ramp to enter home. Pt verbalizes and demo's proper sequencing on steps     Balance  Posture: Good  Sitting - Static: Good  Sitting - Dynamic: Good  Standing - Static: Fair;+  Standing - Dynamic: Fair;+  Comments: standing with RW  Other exercises  Other exercises?: Yes  Other exercises 1: Pt demo's quad sets and ankle pumps  Other exercises 2: Pt is provided written HEP and given verbal i/s on HEP and precautions.  Expresses good understanding and states she is familiar with some of ther exs program     Plan   Plan  Times per week: BID  Times per day: Twice a day  Current Treatment Recommendations: Strengthening, Transfer Training, Endurance Training, Patient/Caregiver Education & Training, ROM, Balance Training, Functional Mobility Training, Stair training, Safety Education & Training, Positioning, Home Exercise Program, Gait Training  Safety Devices  Type of devices: Gait belt, All fall risk precautions in place, Patient at risk for falls, Nurse notified, Call light within reach, Left in chair    G-Code       OutComes Score                                                  AM-PAC Score  AM-PAC Inpatient Mobility Raw Score : 21 (08/12/21 1340)  AM-PAC Inpatient T-Scale Score : 50.25 (08/12/21 1340)  Mobility Inpatient CMS 0-100% Score: 28.97 (08/12/21 1340)  Mobility Inpatient CMS G-Code Modifier : Tariq Gene (08/12/21 1340)          Goals  Short term goals  Time Frame for Short term goals: 1  Short term goal 1: Pt will demo 0-75 degrees AROM  Short term goal 2: Pt will demo at least 3/5 L LE strength  Short term goal 3: Pt will perform bed mobility IND  Short term goal 4: Pt will transfer with RW SBA  Short term goal 5: Pt will ambulate with RW  ft  Patient Goals   Patient goals : Go home       Therapy Time   Individual Concurrent Group Co-treatment   Time In       [address]   Time Out       1332   Minutes       [address] [place]

## 2021-08-12 NOTE — PROGRESS NOTES
Patient admitted into room 2042. Patient is A&Ox4. Vitals stable. Pulses palpable bilateral extremities. Patient's  at bedside. Therapy notified of patient's arrival to unit. Lunch tray ordered. Orders reviewed.

## 2021-08-12 NOTE — OP NOTE
Operative Note      Patient: Joan Chandler  YOB: 1955  MRN: [id number]    Date of Procedure: 8/12/2021    Pre-Op Diagnosis: DEGENERATIVE JOINT DISEASE LEFT KNEE  PT VACCINATED    Post-Op Diagnosis: Same       Procedure(s):  KNEE TOTAL ARTHROPLASTY left    Surgeon(s):  Emily Villasenor MD    Assistant:   Maura Ahumada CST    Anesthesia: General    Estimated Blood Loss (mL): Minimal    Complications: None    Specimens:   * No specimens in log *    Implants:  Implant Name Type Inv. Item Serial No.  Lot No. LRB No. Used Action   CEMENT BNE 40GM HI VISC RADPQ FOR REV SURG  CEMENT BNE 40GM HI VISC RADPQ FOR REV SURG  [employer]- [id number] Left 2 Implanted   COMPONENT PAT [id number] THK8. 5MM STD KNEE TI ALLY S STL  COMPONENT PAT [id number] THK8. 5MM STD KNEE TI ALLY S STL  [employer]- [id number] Left 1 Implanted   TRAY TIB L71MM KNEE COPOLYESTER [place] INTLOK NÉSTOR ZI VANGUARD  TRAY TIB L71MM KNEE COPOLYESTER [place] INTLOK NÉSTOR ZI VANGUARD  [employer]- [id number] Left 1 Implanted   STEM TIB L80MM [id number] KNEE NÉSTOR FIN VANGUARD COMPLT SYS  STEM TIB L80MM [id number] KNEE NÉSTOR FIN VANGUARD COMPLT SYS  [employer]- [id number] Left 1 Implanted   VNGD TI FEM CR 62.5MM LT  VNGD TI FEM CR 62.5MM LT  [employer]- [id number] Left 1 Implanted   BEARING TIB L71MM [id number] KNEE ARCM ANT STBL MOD COMPLT  BEARING TIB L71MM [id number] KNEE ARCM ANT STBL MOD COMPLT  [employer]- [id number] Left 1 Implanted         Drains: * No LDAs found *    Findings: Severe DJD left knee    Detailed Description of Procedure:     Patient is a 77 y.o. female with a long standing history of left DJD of the knee. Patient has failed all types of conservative treatment included physical therapy, weight-loss counseling, NSAIDS, and injections.  The patient has significant restriction of activities of daily living and/or work/job place abilities, and, therefore, has been counseled for TKA. Patient is aware of all the risks and benefits as highlighted in the surgical consent form. It is to be noted the patient has documented nickel allergy and all implants were titanium    The patient was given 2 grams of Ancef in the holding area as well as an adductor canal block. The patient was then taken to the operating suite where general anesthesia was administered. A tourniquet was applied to effected leg and then prepped and draped in the usual sterile fashion. Time out was called to verify laterality. The leg was examined   and the tourniquet inflated to 300 mm of Hg. Midline incision was utilized and taken down to the joint capsule where a mid-vastus approach to the knee was performed. After a complete synovectomy, the patella was elevated, calibrated for thickness, and the above sized, patellar triple pegged drills guide positioned medially and then drilled. Atrial patellar button was positioned in order to re-established the original thickness. This was then replaced with a protective patellar plate. The knee was then flexed and revealed significant  arthrosis. Osteophytes were removed via rongeur. A drill hole was made in the distal femur and the femoral canal was then irrigated and suctioned. A fluted IM adam was inserted and a distal femoral cut was made at 5 degrees of valgus at the +2 setting. The proximal tibia was then exposed after resection of the ACL and lateral meniscus. An extra-medullary tibial cutting jug was then aligned in reference to the tibia tubercle and ankle mortise and positional with a slight posterior slope. The cut was made with minimal cutting of the lowest depth of the tibial wear and the fragment removed. The distal femur was then approached and femoral sizing guide with 3 degrees of external rotation was placed flush with the surface and drill holes made and femoral size determined.  The appropriate size cutting jig was then placed and anterior, posterior, and chamfer cuts made with an oscillating saw. A laminar  was inserted with care to avoid damaging the MCL. Posterior osteophytes were removed and the capsule stripped from the posterior femoral condyles. The capsule was then injected with the orthopedic cocktail at this time. The tibial cut was then assessed with a baseplate and alignment adam to assure proper cut. Spacer blocks were then inserted and the knee was assessed to determine the amount of soft tissue release and osteophyte removal necessary  to establish symmetric flexion and extension gaps. The tibia was then elevated, and the above sized tibial plate was positioned for proper external rotation with an alignment adam down the middle-third of the tibial tubercles. This was pinned in place, the proximal tibialis reamed, the fins were then repacked. The appropriate size femur was positioned and various size of the polyethylene trials were inserted. The knee was assessed for  ROM and patellar tracking. Satisfied with this, this distal femoral logs were drilled and then all the trial components were removed. The knee was irrigated and dried while the cement was prepared. Cement was applied to to both implant and cut surfaces and the implants impacted and the compression with one size larger poly inserted and excess cement removed. The patella was placed and compressed as well. The knee was placed in full extension and then injected with the remainder  of the 100ml of the orthopedic cocktail. The Irrisept lavage was carried out for the 1 minutes. This was then irrigated and a permanent polyethylene was insert was injected with platelet rich/poor plasma and the tourniquet was released at 45 minutes. Hemostasis was excellent. The knee was closed with a #1 Strata-Fix and vastus with a double-layer of O-Vicryl suture.  The subcutaneous tissue closed with a 2-0 Monocryl Strata-Fix  and then a Zip-line used for the

## 2021-08-12 NOTE — PROGRESS NOTES
CLINICAL PHARMACY NOTE: MEDS TO BEDS    Total # of Prescriptions Filled: 1   The following medications were delivered to the patient:  · Norco 5/325     Additional Documentation:  Delivered To Patients Room

## 2021-08-25 ENCOUNTER — OFFICE VISIT (OUTPATIENT)
Dept: ORTHOPEDIC SURGERY | Age: 66
End: 2021-08-25

## 2021-08-25 DIAGNOSIS — Z96.652 STATUS POST TOTAL LEFT KNEE REPLACEMENT: Primary | ICD-10-CM

## 2021-08-25 PROCEDURE — 99024 POSTOP FOLLOW-UP VISIT: CPT | Performed by: ORTHOPAEDIC SURGERY

## 2021-08-25 NOTE — PROGRESS NOTES
Adrienne Flores M.D.            [address] [facility] [facility],[address], [address] [facility]           Dept Phone: [phone]           Dept Fax:  [address], [place]          Dept Phone: [phone]           Dept Fax:  [phone]      Chief Compliant:  Chief Complaint   Patient presents with    Post-Op Check     Lt TKA 8/12/21        History of Present Illness:  Patient returns today status post left TKA times 2 weeks. Patient has no major complaints     Review of Systems   Constitutional: Negative for fever, chills, sweats, recent injury, recent illness  Neurological: Negative for Headaches, numbness, or weakness. Integumentary: Negative for rash, itching, ecchymosis, or wounds. Musculoskeletal: Positive for Post-Op Check (Lt TKA 8/12/21)       Physical Exam:  Constitutional: Patient is oriented to person, place, and time. Patient appears well-developed and well nourished. Musculoskeletal: Normal gait. Motion 0-100 degrees with expected pain with ROM. No Calf tenderness, Negative Katina's sign. Neurovascular intact. Neurological: Patient is alert and oriented to person, place, and time. Normal strenght. No sensory deficit. Skin: Skin is warm and dry. Incision is healing well without signs of redness or drainage  Nursing note and vitals reviewed. Labs and Imaging:     XR taken today:  XR KNEE LEFT (1-2 VIEWS)    Result Date: 8/25/2021  X-rays taken today reviewed by me show AP lateral views of the patient's left total knee. Components are in excellent position on both views. Orders Placed This Encounter   Procedures    XR KNEE LEFT (1-2 VIEWS)     Standing Status:   Future     Number of Occurrences:   1     Standing Expiration Date:   8/24/2022       Assessment and Plan:  1.  Status post total left knee replacement        2 weeks status post left TKA        This is a 77 y.o. female who presents to the clinic today status post left TKA. Continue anticoagulation. Transition to outpatient Pt. Restrictions given. RTO 5-6 weeks. Call if any problems/issues prior to that       Provider Attestation:  Meghan Nolan, personally performed the services described in this documentation. All medical record entries made by the scribe were at my direction and in my presence. I have reviewed the chart and discharge instructions and agree that the records reflect my personal performance and is accurate and complete. Ahmet Barakat MD. 08/25/21        Please note that this chart was generated using voice recognition Dragon dictation software. Although every effort was made to ensure the accuracy of this automated transcription, some errors in transcription may have occurred.

## 2021-09-02 DIAGNOSIS — M25.562 CHRONIC PAIN OF LEFT KNEE: ICD-10-CM

## 2021-09-02 DIAGNOSIS — Z98.890 S/P LEFT KNEE ARTHROSCOPY: ICD-10-CM

## 2021-09-02 DIAGNOSIS — G89.29 CHRONIC PAIN OF LEFT KNEE: ICD-10-CM

## 2021-09-02 RX ORDER — HYDROCODONE BITARTRATE AND ACETAMINOPHEN 5; 325 MG/1; MG/1
1 TABLET ORAL EVERY 6 HOURS PRN
Qty: 28 TABLET | Refills: 0 | Status: SHIPPED | OUTPATIENT
Start: 2021-09-02 | End: 2021-09-09

## 2021-09-02 NOTE — TELEPHONE ENCOUNTER
Patient is requesting refill on Norco.  LRF 8/12/21 #42 for Chronic pain of left knee, S/P left knee arthroscopy on 8/12/21

## 2021-11-17 ENCOUNTER — OFFICE VISIT (OUTPATIENT)
Dept: ORTHOPEDIC SURGERY | Age: 66
End: 2021-11-17

## 2021-11-17 DIAGNOSIS — Z96.652 STATUS POST TOTAL LEFT KNEE REPLACEMENT: Primary | ICD-10-CM

## 2021-11-17 PROCEDURE — 99024 POSTOP FOLLOW-UP VISIT: CPT | Performed by: ORTHOPAEDIC SURGERY

## 2022-01-11 DIAGNOSIS — Z96.652 STATUS POST TOTAL LEFT KNEE REPLACEMENT: Primary | ICD-10-CM

## 2022-01-11 RX ORDER — AMOXICILLIN 500 MG/1
500 CAPSULE ORAL SEE ADMIN INSTRUCTIONS
Qty: 6 CAPSULE | Refills: 0 | Status: SHIPPED | OUTPATIENT
Start: 2022-01-11 | End: 2022-01-21

## 2022-01-11 NOTE — TELEPHONE ENCOUNTER
Patient called requesting antibiotics as she has a dental appt this Monday [facility] Please advise

## 2022-03-03 ENCOUNTER — OFFICE VISIT (OUTPATIENT)
Dept: GASTROENTEROLOGY | Age: 67
End: 2022-03-03
Payer: MEDICARE

## 2022-03-03 VITALS
HEART RATE: 63 BPM | SYSTOLIC BLOOD PRESSURE: 139 MMHG | WEIGHT: 200.2 LBS | DIASTOLIC BLOOD PRESSURE: 88 MMHG | BODY MASS INDEX: 32.17 KG/M2 | TEMPERATURE: 97.5 F | OXYGEN SATURATION: 97 % | HEIGHT: 66 IN

## 2022-03-03 DIAGNOSIS — K21.9 GASTROESOPHAGEAL REFLUX DISEASE, UNSPECIFIED WHETHER ESOPHAGITIS PRESENT: ICD-10-CM

## 2022-03-03 DIAGNOSIS — Z12.11 SCREENING FOR COLON CANCER: Primary | ICD-10-CM

## 2022-03-03 DIAGNOSIS — R93.5 ABNORMAL CT OF THE ABDOMEN: ICD-10-CM

## 2022-03-03 DIAGNOSIS — K63.5 POLYP OF COLON, UNSPECIFIED PART OF COLON, UNSPECIFIED TYPE: ICD-10-CM

## 2022-03-03 PROCEDURE — 4040F PNEUMOC VAC/ADMIN/RCVD: CPT | Performed by: INTERNAL MEDICINE

## 2022-03-03 PROCEDURE — 3017F COLORECTAL CA SCREEN DOC REV: CPT | Performed by: INTERNAL MEDICINE

## 2022-03-03 PROCEDURE — APPSS60 APP SPLIT SHARED TIME 46-60 MINUTES: Performed by: NURSE PRACTITIONER

## 2022-03-03 PROCEDURE — 1036F TOBACCO NON-USER: CPT | Performed by: INTERNAL MEDICINE

## 2022-03-03 PROCEDURE — G8417 CALC BMI ABV UP PARAM F/U: HCPCS | Performed by: INTERNAL MEDICINE

## 2022-03-03 PROCEDURE — 1123F ACP DISCUSS/DSCN MKR DOCD: CPT | Performed by: INTERNAL MEDICINE

## 2022-03-03 PROCEDURE — G8427 DOCREV CUR MEDS BY ELIG CLIN: HCPCS | Performed by: INTERNAL MEDICINE

## 2022-03-03 PROCEDURE — 1090F PRES/ABSN URINE INCON ASSESS: CPT | Performed by: INTERNAL MEDICINE

## 2022-03-03 PROCEDURE — G8399 PT W/DXA RESULTS DOCUMENT: HCPCS | Performed by: INTERNAL MEDICINE

## 2022-03-03 PROCEDURE — 99204 OFFICE O/P NEW MOD 45 MIN: CPT | Performed by: INTERNAL MEDICINE

## 2022-03-03 PROCEDURE — G8484 FLU IMMUNIZE NO ADMIN: HCPCS | Performed by: INTERNAL MEDICINE

## 2022-03-03 RX ORDER — BISACODYL 5 MG
TABLET, DELAYED RELEASE (ENTERIC COATED) ORAL
Qty: 2 TABLET | Refills: 0 | Status: ON HOLD | OUTPATIENT
Start: 2022-03-03 | End: 2022-05-02 | Stop reason: ALTCHOICE

## 2022-03-03 RX ORDER — POLYETHYLENE GLYCOL 3350 17 G/17G
POWDER, FOR SOLUTION ORAL
Qty: 238 G | Refills: 0 | Status: ON HOLD | OUTPATIENT
Start: 2022-03-03 | End: 2022-05-02 | Stop reason: ALTCHOICE

## 2022-03-03 ASSESSMENT — ENCOUNTER SYMPTOMS
SORE THROAT: 0
CONSTIPATION: 0
VOMITING: 0
COUGH: 1
NAUSEA: 0
ANAL BLEEDING: 1
TROUBLE SWALLOWING: 0
SHORTNESS OF BREATH: 0
BLOOD IN STOOL: 0
DIARRHEA: 0
BACK PAIN: 0
ABDOMINAL PAIN: 0
ABDOMINAL DISTENTION: 1
VOICE CHANGE: 0
CHOKING: 0
RECTAL PAIN: 0

## 2022-03-03 NOTE — PROGRESS NOTES
GI OFFICE FOLLOW UP    INTERVAL HISTORY:   No referring provider defined for this encounter. Chief Complaint   Patient presents with    Follow-up     Patient is here today to f/u on CT       1. Screening for colon cancer    2. Abnormal CT of the abdomen    3. Polyp of colon, unspecified part of colon, unspecified type        HISTORY OF PRESENT ILLNESS:     Returning patient being seen for abnormal CT imaging of abdomen. Noted soft tissue fullness in the anorectal junction. Patient reports history of IBS with diarrhea. Denies any melena. Has scant rectal bleeding on occasion with straining. No abdominal pain, cramping, bloating. No weight gain. Has good appetite. Last colonoscopy 2017. Has hx of multiple polyps, flat polyps. It was recommended she have surveillance colonoscopy in 2018 however she did not follow-up. Patient denies any dysphagia, odynophagia. Has chronic GERD. On PPI therapy. Recently she reports some breakthrough heartburns. No NSAIDs  No AC or antiplatelet therapies. Past Medical,Family, and Social History reviewed and does contribute to the patient presenting condition. Patient's PMH/PSH,SH,PSYCH Hx, MEDs, ALLERGIES, and ROS were all reviewed and updated in the appropriate sections.  Yes      PAST MEDICAL HISTORY:  Past Medical History:   Diagnosis Date    Arthritis     Bleb, lung ([address]     causes  pneumothorax     Colon polyps 11/18/2017    COPD (chronic obstructive pulmonary disease) (HCC)     GERD (gastroesophageal reflux disease)     Heart palpitations     History of colon polyps 2002    Hyperlipidemia     Hyperplastic colon polyp 2016    Hypertension     Kidney infection 2004    Pneumothorax     spontaneous x 2, chest tube    Supraventricular tachycardia ([address]     first episode about 20 years ago, well controlled now with Atenolol Past Surgical History:   Procedure Laterality Date    CHOLECYSTECTOMY, LAPAROSCOPIC  2010    COLONOSCOPY  9/2009    small hemorrhoids    COLONOSCOPY  7/2002    hemohhoids, hyperplastic polyp, right colon pathology-chronic inflammation in lumina propria with focal actue cryptitis consistent with active colitis    COLONOSCOPY  02/17/2016    flat sigmoid [id number]-[id number]    COLONOSCOPY  11/18/2017    NO RECURRENT POLYPS SEEN AT THE PREVIOUS POLYPECTOMY SITE.  hOWEVER IN THE SIGMOID COLON AT ABOUT 50 CM FROM THE ANUS FLAT POLYP SEEN. , PATHOLOGY-- LARGE INTESTINAL TYPE MUCOSA WITH MIXED FEATURES OF     DILATION AND CURETTAGE      AUB    GYNECOLOGIC CRYOSURGERY  30's    KNEE ARTHROSCOPY Left 10/6/2020    KNEE ARTHROSCOPY PARTIAL MEDIAL MENISCECTOMY performed by Ezra Garcia MD at [facility] [place] W/REMOVAL LESION BY  [address] N/A 11/18/2017    COLONOSCOPY POLYPECTOMY HOT BIOPSY, COLD BIOPSY, APC SIGMOID, SPOT MARKING SIGMOID performed by Jane Claudio MD at [address] [place] [address]  2003    hemorrhoids, no colitis seen    TONSILLECTOMY AND ADENOIDECTOMY      child     TOTAL KNEE ARTHROPLASTY Left 8/12/2021    KN[facility]    Current Outpatient Medications:     pantoprazole (PROTONIX) 20 MG tablet, Take 20 mg by mouth daily, Disp: , Rfl:     ciprofloxacin-dexamethasone (CIPRODEX) 0.3-0.1 % otic suspension, Instill 1-2 drops into each ear as directed, Disp: 1 each, Rfl: 0    lisinopril-hydroCHLOROthiazide (PRINZIDE;ZESTORETIC) 20-12.5 MG per tablet, Take one tablet daily, Disp: 90 tablet, Rfl: 0    atenolol (TENORMIN) 25 MG tablet, TAKE 1 TABLET BY MOUTH TWICE DAILY, Disp: 180 tablet, Rfl: 0    Probiotic Product (PROBIOTIC DAILY PO), Take by mouth daily, Disp: , Rfl:     Multiple Vitamins-Minerals (THERAPEUTIC MULTIVITAMIN-MINERALS) tablet, Take 1 tablet by mouth daily, Disp: , Rfl:   Cholecalciferol (VITAMIN D PO), Take 1 tablet by mouth daily, Disp: , Rfl:     ALLERGIES:   Allergies   Allergen Reactions    Avelox [Moxifloxacin]     Biaxin [Clarithromycin] Hives    Epinephrine Other (See Comments)     Increased BP, Tachycardia    Neosporin [Neomycin-Polymyxin-Gramicidin]     Nickel     Sulfa Antibiotics     Adhesive Tape Rash    Phenylephrine Rash       FAMILY HISTORY:       Problem Relation Age of Onset    Cancer Father 28        bone sarcoma    Hypertension Mother    [facility] Other Mother         peripheral neuropathy    Uterine Cancer Maternal Aunt     Breast Cancer Other         dx first before age 48 and again @ 79    Colon Cancer Maternal Aunt         all dx before age 48    Colon Cancer Maternal Uncle         1 uncle in 42's dx, other in 68's    Colon Cancer Other         multiple cousins dx before age 48   [facility] Uterine Cancer Paternal Aunt         dx after    Ovarian Cancer Paternal Aunt         dx after age 48   [facility] Lung Cancer Paternal Aunt         dx after 48    Colon Cancer Paternal Grandfather         dx after age 48    Colon Cancer Maternal Grandmother 55    Liver Cancer Maternal Grandmother     Diabetes Neg Hx     Eclampsia Neg Hx      Labor Neg Hx     Spont Abortions Neg Hx     Stroke Neg Hx     Depression Neg Hx          SOCIAL HISTORY:   Social History     Socioeconomic History    Marital status:      Spouse name: Antony Bond Number of children: 2    Years of education: Not on file    Highest education level: Associate degree: occupational, technical, or vocational program   Occupational History    Not on file   Tobacco Use    Smoking status: Former Smoker     Packs/day: 1.00     Years: 20.00     Pack years: 20.00     Types: Cigarettes     Quit date: 9/15/1996     Years since quittin.4    Smokeless tobacco: Never Used   Vaping Use    Vaping Use: Never used   Substance and Sexual Activity    Alcohol use:  Yes     Alcohol/week: 0.0 standard drinks     Comment: occasional    Drug use: No    Sexual activity: Yes     Partners: Male     Birth control/protection: Surgical     Comment:  vasectomy   Other Topics Concern    Not on file   Social History Narrative    Not on file     Social Determinants of Health     Financial Resource Strain:     Difficulty of Paying Living Expenses: Not on file   Food Insecurity:     Worried About Running Out of Food in the Last Year: Not on file    Ernesto of Food in the Last Year: Not on file   Transportation Needs:     Lack of Transportation (Medical): Not on file    Lack of Transportation (Non-Medical): Not on file   Physical Activity:     Days of Exercise per Week: Not on file    Minutes of Exercise per Session: Not on file   Stress:     Feeling of Stress : Not on file   Social Connections:     Frequency of Communication with Friends and Family: Not on file    Frequency of Social Gatherings with Friends and Family: Not on file    Attends [employer] Services: Not on file    Active Member of [address] [place] [address] or Organizations: Not on file    Attends Club or Organization Meetings: Not on file    Marital Status: Not on file   Intimate Partner Violence:     Fear of Current or Ex-Partner: Not on file    Emotionally Abused: Not on file    Physically Abused: Not on file    Sexually Abused: Not on file   Housing Stability:     Unable to Pay for Housing in the Last Year: Not on file    Number of [place] in the Last Year: Not on file    Unstable Housing in the Last Year: Not on file         REVIEW OF SYSTEMS:         Review of Systems   Constitutional: Positive for fatigue (covid 3 times). Negative for appetite change and unexpected weight change. HENT: Negative for dental problem, sore throat, trouble swallowing and voice change. Eyes: Negative for visual disturbance. Respiratory: Positive for cough (covid). Negative for choking and shortness of breath.     Cardiovascular: Negative for chest pain and leg swelling. Gastrointestinal: Positive for abdominal distention and anal bleeding. Negative for abdominal pain, blood in stool, constipation, diarrhea, nausea, rectal pain and vomiting. Genitourinary: Negative for difficulty urinating. Musculoskeletal: Negative for back pain, joint swelling and myalgias. Neurological: Negative for dizziness, tremors, weakness, light-headedness, numbness and headaches. Hematological: Does not bruise/bleed easily. Psychiatric/Behavioral: Negative for sleep disturbance. The patient is not nervous/anxious. PHYSICAL EXAMINATION:     Vital signs reviewed per the nursing documentation. /88   Pulse 63   Temp 97.5 °F (36.4 °C)   Ht 5' 6\" (1.676 m)   Wt 200 lb 3.2 oz (90.8 kg)   LMP 06/26/1997   SpO2 97%   BMI 32.31 kg/m²   Body mass index is 32.31 kg/m². Physical Exam  Constitutional:       Appearance: Normal appearance. She is well-groomed and overweight. Eyes:      General: No scleral icterus. Pupils: Pupils are equal, round, and reactive to light. Cardiovascular:      Rate and Rhythm: Normal rate and regular rhythm. Heart sounds: Normal heart sounds. Pulmonary:      Effort: Pulmonary effort is normal.      Breath sounds: Normal breath sounds. Abdominal:      General: Bowel sounds are normal. There is no distension. Palpations: Abdomen is soft. There is no mass. Tenderness: There is no abdominal tenderness. There is no guarding. Skin:     General: Skin is warm and dry. Coloration: Skin is not jaundiced. Neurological:      Mental Status: She is alert and oriented to person, place, and time. Mental status is at baseline.            LABORATORY DATA: Reviewed  Lab Results   Component Value Date    WBC 6.8 07/02/2021    HGB 13.6 07/02/2021    HCT 41.9 07/02/2021    MCV 95.2 07/02/2021     07/02/2021     07/02/2021    K 4.7 07/02/2021     07/02/2021    CO2 27 07/02/2021    BUN 24 (H) 07/02/2021 CREATININE 0.81 07/02/2021    LABPROT 7.2 11/11/2012    LABALBU 4.0 08/31/2018    BILITOT 0.29 (L) 08/31/2018    ALKPHOS 69 08/31/2018    AST 16 08/31/2018    ALT 18 08/31/2018    INR 1.0 08/31/2018         Lab Results   Component Value Date    RBC 4.40 07/02/2021    HGB 13.6 07/02/2021    MCV 95.2 07/02/2021    MCH 31.0 07/02/2021    MCHC 32.6 07/02/2021    RDW 12.7 07/02/2021    MPV 9.7 07/02/2021    BASOPCT 1 07/02/2021    LYMPHSABS 1.50 07/02/2021    MONOSABS 0.50 07/02/2021    NEUTROABS 4.50 07/02/2021    EOSABS 0.20 07/02/2021    BASOSABS 0.10 07/02/2021         DIAGNOSTIC TESTING:     Procedure Note    Jennifer Castaneda MD - 01/29/2022   Formatting of this note might be different from the original.     HISTORY and Tech Notes:   Abdominal trauma, blunt     Pat. States she fell earlier this morning she does have hematuria   118ml's omnie 300     PROCEDURE: CT abdomen pelvis   The images were obtained with IV contrast   No GI contrast was used    Automated exposure control was utilized     COMPARISON:   Study done, a short-term chest CT reported separate   Previous noncontrast abdominal CT from 2009       FINDINGS:   No visible solid organ laceration or hematoma     UPPER ABDOMEN   No splenomegaly. No suspicious kidney mass was seen.       Lobulated renal contours bilaterally with numerous cortical and parapelvic cysts   Largest cyst on the right measures around 4.5 cm, increased from around 2 cm in 2009   Largest cyst on the left now measures around 27.5 mm, also increased from 2009   No peripancreatic fluid collections. No suspicious liver mass.    Suggestion of tiny hepatic cysts similar to before   GB removed   No fluid collection   Small hiatal hernia   Slight elevation right diaphragm anteriorly   No ductal dilatation or perihepatic fluid seen      No suspicious adrenal mass seen     Small skin or subcutaneous lesion in the left upper abdomen looks increased from before.  It now measures around 19 mm.  There is no surrounding fluid or gas or inflammatory change     PELVIS   No hydronephrosis . No sign of a bowel obstruction. Mild soft tissue fullness along the posterior aspect of the anorectal junction with moderate stool   No sign of an abdominal aortic aneurysm. No retroperitoneal bleed or suspicious adenopathy. No sign to suggest acute appendicitis. LUNG BASE EVALUATION    no basilar consolidation or significant effusion. Emphysematous changes with cysts or blebs or pneumatoceles in both lower lungs   Some bandlike density in the left base likely atelectasis and/or related to the fatty tissue along the epicardial region or pleura   Please refer to the separate chest report. BONE RECONSTRUCTIONS    no compression deformity. Degenerative change with slight anterior translation L4 on L5. IMPRESSION:     No acute posttraumatic abdominal pelvic findings     Lobulated renal contours with increased cortical and parapelvic cysts     Some nonspecific soft tissue fullness at the anorectal junction posteriorly.  Consider more detailed lower GI workup when appropriate to prove benign condition. Assessment  1. Screening for colon cancer    2. Abnormal CT of the abdomen    3. Polyp of colon, unspecified part of colon, unspecified type        Plan  Patient seen along with APRN. History discussed. At present patient is asymptomatic. On rare occasion she does have questionable blood on the toilet tissue. No tenesmus. Bowel movements satisfactory without urgency of bowel movements. Patient also has chronic GERD and has been on PPI therapy. No dysphagia. No symptoms suggestive of extra esophageal manifestation of GERD. Recently reports breakthrough heartburns, mostly at night. Examination nonspecific. To further evaluate abnormality seen on the CT scan she needs colonoscopy that is arranged.   Also at that time patient will be scheduled for an EGD to evaluate chronic GERD and to rule out Astudillo's, esophagitis, hiatal hernia. Explained to the patient regarding these procedures, risks and benefits, adequate colon preparation. Patient understood and agreed. The Endoscopic procedure was explained to the patient in detail  The prep and NPO were explained  All the Risks, Benefits, and Alternatives were explained  Risk of Bleeding, Perforation and Cardio Respiratory risks were explained  her questions were answered  The procedure has been scheduled with the  in the office  Patient was asked to give us a call for any questions  The patient has verbalized understanding and agreement to this plan. The patient was counseled at length about the risks of jennifer Covid-19 during their perioperative period and any recovery window from their procedure. The patient was made aware that jennifer Covid-19  may worsen their prognosis for recovering from their procedure  and lend to a higher morbidity and/or mortality risk. All material risks, benefits, and reasonable alternatives including postponing the procedure were discussed. The patient does wish to proceed with the procedure at this time. Thank you for allowing me to participate in the care of Ms. Deluca. For any further questions please do not hesitate to contact me. I have reviewed and agree with the ROS entered by the MA/LPN. Note is dictated utilizing voice recognition software. Unfortunately this leads to occasional typographical errors.  Please contact our office if you have any questions        Gogo Barnes MD,FACP, [facility]  Board Certified in Gastroenterology and [address] [place] [address] Gastroenterology  Office #: [phone]

## 2022-03-07 ENCOUNTER — TELEPHONE (OUTPATIENT)
Dept: GASTROENTEROLOGY | Age: 67
End: 2022-03-07

## 2022-03-08 ENCOUNTER — OFFICE VISIT (OUTPATIENT)
Dept: ORTHOPEDIC SURGERY | Age: 67
End: 2022-03-08
Payer: MEDICARE

## 2022-03-08 VITALS — HEIGHT: 66 IN | WEIGHT: 200 LBS | BODY MASS INDEX: 32.14 KG/M2

## 2022-03-08 DIAGNOSIS — M17.12 PRIMARY OSTEOARTHRITIS OF LEFT KNEE: Primary | ICD-10-CM

## 2022-03-08 DIAGNOSIS — Z98.890 S/P LEFT KNEE ARTHROSCOPY: ICD-10-CM

## 2022-03-08 PROCEDURE — G8399 PT W/DXA RESULTS DOCUMENT: HCPCS | Performed by: PHYSICIAN ASSISTANT

## 2022-03-08 PROCEDURE — 1036F TOBACCO NON-USER: CPT | Performed by: PHYSICIAN ASSISTANT

## 2022-03-08 PROCEDURE — 3017F COLORECTAL CA SCREEN DOC REV: CPT | Performed by: PHYSICIAN ASSISTANT

## 2022-03-08 PROCEDURE — 4040F PNEUMOC VAC/ADMIN/RCVD: CPT | Performed by: PHYSICIAN ASSISTANT

## 2022-03-08 PROCEDURE — 1123F ACP DISCUSS/DSCN MKR DOCD: CPT | Performed by: PHYSICIAN ASSISTANT

## 2022-03-08 PROCEDURE — G8417 CALC BMI ABV UP PARAM F/U: HCPCS | Performed by: PHYSICIAN ASSISTANT

## 2022-03-08 PROCEDURE — 99213 OFFICE O/P EST LOW 20 MIN: CPT | Performed by: PHYSICIAN ASSISTANT

## 2022-03-08 PROCEDURE — G8427 DOCREV CUR MEDS BY ELIG CLIN: HCPCS | Performed by: PHYSICIAN ASSISTANT

## 2022-03-08 PROCEDURE — 1090F PRES/ABSN URINE INCON ASSESS: CPT | Performed by: PHYSICIAN ASSISTANT

## 2022-03-08 PROCEDURE — G8484 FLU IMMUNIZE NO ADMIN: HCPCS | Performed by: PHYSICIAN ASSISTANT

## 2022-03-09 NOTE — PROGRESS NOTES
[facility], [address] [address] Ahuja Mellette, [address] [place] [address], Kina Aleman [address].           Dept Phone: [phone]           Dept Fax:  [phone] [facility]          Dept Phone: [phone]           Dept Fax:  [phone]      Chief Compliant:  Chief Complaint   Patient presents with    Post-Op Check     L TKA 8/12/21        History of Present Illness:  [facility] returns today. This is a 74 y.o. female who presents to the clinic today for follow up of status post left total knee arthroplasty on 8/12/2021. Patient is here for 6-month follow-up. She reports she is doing quite well she has some weakness/discomfort when going downstairs but states she feels like she is regaining her strength. She maintains excellent range of motion she has no other complaints at this time. Continues to do daily home exercises as provided by PT upon discharge from PT couple months ago. .       Review of Systems   Constitutional: Negative for fever, chills, sweats, recent illness, or recent injury. Neurological: Negative for headaches, numbness, or weakness. Integumentary: Negative for rash, itching, ecchymosis, abrasions, or laceration. Musculoskeletal: Positive for Post-Op Check (L TKA 8/12/21)       Physical Exam:  Constitutional: Patient is oriented to person, place, and time. Patient appears well-developed and well nourished. Musculoskeletal:    Left Knee    Gait:  Normal.   Incision:  {Well healed without any incisional erythema. Tenderness:  none   Flexion ROM:  125   Extension ROM:  0   Effusion:  no   DVT Evaluation:  No evidence of DVT seen on physical exam.       Neurological: Patient is alert and oriented to person, place, and time. Normal strenght. No sensory deficit.   Skin: Skin is warm and dry  Psychiatric: Behavior is normal. Thought content normal.  Nursing note and vitals reviewed. Labs and Imaging:     XR taken today:  No results found. No new x-rays are taken today however initial postop x-rays taken on 8/28/2021 are again reviewed by me  2 views of the left knee AP bilateral standing lateral view of the left knee demonstrates left total knee arthroplasty components in excellent position with no hardware complication    Assessment and Plan:  1. Primary osteoarthritis of left knee    2. S/P left knee arthroscopy              PLAN:  This is a 74 y.o. female who presents to the clinic today for follow up 7-month status post left total knee arthroplasty on 8/12/2021.  1.  Overall patient reports she is doing quite well notes some weakness when going downstairs but has very little problems otherwise. 2.  On examination patient has excellent range of motion and excellent stability no pain on exam.  3.  Recommend follow-up at 1 year for annual x-rays in August 2022. All question concerns addressed today patient is educated should she have any problems or concerns recommend follow-up sooner otherwise we will see her back for annual follow-up. Electronically signed by KEI Ayala on 3/9/2022 at 9:13 AM      Please note that this chart was generated using voice recognition Dragon dictation software. Although every effort was made to ensure the accuracy of this automated transcription, some errors in transcription may have occurred.

## 2022-03-28 ENCOUNTER — HOSPITAL ENCOUNTER (OUTPATIENT)
Dept: PREADMISSION TESTING | Age: 67
Discharge: HOME | End: 2022-04-01

## 2022-03-28 VITALS — WEIGHT: 195 LBS | HEIGHT: 66 IN | BODY MASS INDEX: 31.34 KG/M2

## 2022-03-28 NOTE — TELEPHONE ENCOUNTER
Per PAT at [facility], pt has covid symptoms. Has procedure scheduled 4/4/22. Will contact pt.

## 2022-03-28 NOTE — TELEPHONE ENCOUNTER
Called pt; she states she has sinus drainage and a mild cough. States she tested positive for covid back in February when she was at [facility]. Pt states she is not sure if it's a cold. She does not wish to cancel at this time. States she will call on Wednesday and if she is still having symptoms, she will cancel. negative No joint pain, swelling or deformity; no limitation of movement

## 2022-03-28 NOTE — PROGRESS NOTES
During telephone PAT visit Bi King verbalized she was not feeling well and felt as though she may have covid again. Writer relayed this information onto MARIBELL Drake RN  So she can pass along to Dr. Santhosh Costa office.

## 2022-03-28 NOTE — PROGRESS NOTES
Pre-op Instructions For Out-Patient Surgery    Medication Instructions:  · Please stop herbs and any supplements now (includes vitamins and minerals). · Please contact your surgeon and prescribing physician for pre-op instructions for any blood thinners. Ibuprofen Kermit Gitelman will also take her Lisinopril without Hydrochlorothiazide )    · If you have inhalers/aerosol treatments at home, please use them the morning of your surgery and bring the inhalers with you to the hospital.    · Please take the following medications the morning of your surgery with a sip of water:    Atenolol      Surgery Instructions:  1. After midnight before surgery:  Do not eat or drink anything, including water, mints, gum, and hard candy. You may brush your teeth without swallowing. No smoking, chewing tobacco, or street drugs. 2. Please shower or bathe before surgery. 3. Please do not wear any cologne, lotion, powder, deodorant, jewelry, piercings, perfume, makeup, nail polish, hair accessories, or hair spray on the day of surgery. Wear loose comfortable clothing. 4. Leave your valuables at home. Bring a storage case for any glasses/contacts. 5. An adult who is responsible for you MUST drive you home and should be with you for the first 24 hours after surgery. 6. If having out-patient knee and foot surgeries, please arrange for planned crutches, walker, or wheelchair before arriving to the hospital.    The Day of Surgery:  · Arrive at [address] [place] [address] Surgery Entrance at the time directed by your surgeon and check in at the desk. · If you have a living will or healthcare power of , please bring a copy. · You will be taken to the pre-op holding area where you will be prepared for surgery. A physical assessment will be performed by a nurse practitioner or house officer.   Your IV will be started and you will meet your anesthesiologist.    · When you go to surgery, your family will be directed to the surgical waiting room, where the doctor should speak with them after your surgery. · After surgery, you will be taken to the recovery room then when you are awake and stable you will go to the short stay unit for preparation to be discharged. · If you use a Bi-PAP or C-PAP machine, please bring it with you and leave it in the car in case it is needed in recovery room. Instructions read to Harinder Gama and understanding verbalized.      4/4/22 EGD & Colonoscopy

## 2022-03-31 NOTE — TELEPHONE ENCOUNTER
Pt called today, states she is sick and she wishes to cancel her procedure. We did r/s her for May 2nd at [facility] as she needed a Monday. Pt was offered sooner but she declined. Pt states she will go pick her prep up. New prep instructions sent to her via [address] Pt is vacc.  Pt r/s with [facility].

## 2022-04-22 ENCOUNTER — TELEPHONE (OUTPATIENT)
Dept: ORTHOPEDIC SURGERY | Age: 67
End: 2022-04-22

## 2022-04-22 RX ORDER — POLYETHYLENE GLYCOL 3350 17 G/17G
POWDER, FOR SOLUTION ORAL
Qty: 238 G | Refills: 0 | Status: ON HOLD | OUTPATIENT
Start: 2022-04-22 | End: 2022-05-02 | Stop reason: ALTCHOICE

## 2022-04-22 RX ORDER — BISACODYL 5 MG
TABLET, DELAYED RELEASE (ENTERIC COATED) ORAL
Qty: 4 TABLET | Refills: 0 | Status: ON HOLD | OUTPATIENT
Start: 2022-04-22 | End: 2022-05-02 | Stop reason: ALTCHOICE

## 2022-04-22 RX ORDER — AMOXICILLIN 500 MG/1
500 CAPSULE ORAL 2 TIMES DAILY
Qty: 6 CAPSULE | Refills: 0 | Status: SHIPPED | OUTPATIENT
Start: 2022-04-22 | End: 2022-04-25

## 2022-04-22 NOTE — TELEPHONE ENCOUNTER
Pt called in stating she had  lt TKA 8/12/21,with dr Elva Hester and she has a dentist appt coming up in 1 week and needs an antibiotic called into her [address] in [facility]

## 2022-05-02 ENCOUNTER — HOSPITAL ENCOUNTER (OUTPATIENT)
Age: 67
Setting detail: OUTPATIENT SURGERY
Discharge: HOME | End: 2022-05-02
Attending: INTERNAL MEDICINE | Admitting: INTERNAL MEDICINE
Payer: MEDICARE

## 2022-05-02 ENCOUNTER — ANESTHESIA (OUTPATIENT)
Dept: ENDOSCOPY | Age: 67
End: 2022-05-02
Payer: MEDICARE

## 2022-05-02 ENCOUNTER — ANESTHESIA EVENT (OUTPATIENT)
Dept: ENDOSCOPY | Age: 67
End: 2022-05-02
Payer: MEDICARE

## 2022-05-02 VITALS
RESPIRATION RATE: 16 BRPM | TEMPERATURE: 97 F | WEIGHT: 195 LBS | OXYGEN SATURATION: 98 % | BODY MASS INDEX: 31.34 KG/M2 | HEIGHT: 66 IN | SYSTOLIC BLOOD PRESSURE: 118 MMHG | DIASTOLIC BLOOD PRESSURE: 76 MMHG | HEART RATE: 81 BPM

## 2022-05-02 VITALS — OXYGEN SATURATION: 94 % | SYSTOLIC BLOOD PRESSURE: 92 MMHG | DIASTOLIC BLOOD PRESSURE: 61 MMHG

## 2022-05-02 PROCEDURE — 3609010600 HC COLONOSCOPY POLYPECTOMY SNARE/COLD BIOPSY: Performed by: INTERNAL MEDICINE

## 2022-05-02 PROCEDURE — 3700000000 HC ANESTHESIA ATTENDED CARE: Performed by: INTERNAL MEDICINE

## 2022-05-02 PROCEDURE — 7100000010 HC PHASE II RECOVERY - FIRST 15 MIN: Performed by: INTERNAL MEDICINE

## 2022-05-02 PROCEDURE — 88305 TISSUE EXAM BY PATHOLOGIST: CPT

## 2022-05-02 PROCEDURE — 2709999900 HC NON-CHARGEABLE SUPPLY: Performed by: INTERNAL MEDICINE

## 2022-05-02 PROCEDURE — 7100000011 HC PHASE II RECOVERY - ADDTL 15 MIN: Performed by: INTERNAL MEDICINE

## 2022-05-02 PROCEDURE — 43239 EGD BIOPSY SINGLE/MULTIPLE: CPT | Performed by: INTERNAL MEDICINE

## 2022-05-02 PROCEDURE — 45380 COLONOSCOPY AND BIOPSY: CPT | Performed by: INTERNAL MEDICINE

## 2022-05-02 PROCEDURE — 6360000002 HC RX W HCPCS: Performed by: NURSE ANESTHETIST, CERTIFIED REGISTERED

## 2022-05-02 PROCEDURE — 3609012400 HC EGD TRANSORAL BIOPSY SINGLE/MULTIPLE: Performed by: INTERNAL MEDICINE

## 2022-05-02 PROCEDURE — 43251 EGD REMOVE LESION SNARE: CPT | Performed by: INTERNAL MEDICINE

## 2022-05-02 PROCEDURE — 3700000001 HC ADD 15 MINUTES (ANESTHESIA): Performed by: INTERNAL MEDICINE

## 2022-05-02 PROCEDURE — 2500000003 HC RX 250 WO HCPCS: Performed by: NURSE ANESTHETIST, CERTIFIED REGISTERED

## 2022-05-02 PROCEDURE — 2580000003 HC RX 258: Performed by: ANESTHESIOLOGY

## 2022-05-02 RX ORDER — LIDOCAINE HYDROCHLORIDE 10 MG/ML
1 INJECTION, SOLUTION EPIDURAL; INFILTRATION; INTRACAUDAL; PERINEURAL
Status: DISCONTINUED | OUTPATIENT
Start: 2022-05-02 | End: 2022-05-02 | Stop reason: HOSPADM

## 2022-05-02 RX ORDER — SODIUM CHLORIDE 0.9 % (FLUSH) 0.9 %
5-40 SYRINGE (ML) INJECTION EVERY 12 HOURS SCHEDULED
Status: DISCONTINUED | OUTPATIENT
Start: 2022-05-02 | End: 2022-05-02 | Stop reason: HOSPADM

## 2022-05-02 RX ORDER — SODIUM CHLORIDE 0.9 % (FLUSH) 0.9 %
5-40 SYRINGE (ML) INJECTION PRN
Status: DISCONTINUED | OUTPATIENT
Start: 2022-05-02 | End: 2022-05-02 | Stop reason: HOSPADM

## 2022-05-02 RX ORDER — LIDOCAINE HYDROCHLORIDE 10 MG/ML
INJECTION, SOLUTION EPIDURAL; INFILTRATION; INTRACAUDAL; PERINEURAL PRN
Status: DISCONTINUED | OUTPATIENT
Start: 2022-05-02 | End: 2022-05-02 | Stop reason: SDUPTHER

## 2022-05-02 RX ORDER — FENTANYL CITRATE 50 UG/ML
25 INJECTION, SOLUTION INTRAMUSCULAR; INTRAVENOUS EVERY 5 MIN PRN
Status: DISCONTINUED | OUTPATIENT
Start: 2022-05-02 | End: 2022-05-02 | Stop reason: HOSPADM

## 2022-05-02 RX ORDER — SODIUM CHLORIDE 9 MG/ML
INJECTION, SOLUTION INTRAVENOUS PRN
Status: DISCONTINUED | OUTPATIENT
Start: 2022-05-02 | End: 2022-05-02 | Stop reason: HOSPADM

## 2022-05-02 RX ORDER — PROPOFOL 10 MG/ML
INJECTION, EMULSION INTRAVENOUS PRN
Status: DISCONTINUED | OUTPATIENT
Start: 2022-05-02 | End: 2022-05-02 | Stop reason: SDUPTHER

## 2022-05-02 RX ORDER — FENTANYL CITRATE 50 UG/ML
INJECTION, SOLUTION INTRAMUSCULAR; INTRAVENOUS PRN
Status: DISCONTINUED | OUTPATIENT
Start: 2022-05-02 | End: 2022-05-02 | Stop reason: SDUPTHER

## 2022-05-02 RX ORDER — PROPOFOL 10 MG/ML
INJECTION, EMULSION INTRAVENOUS CONTINUOUS PRN
Status: DISCONTINUED | OUTPATIENT
Start: 2022-05-02 | End: 2022-05-02 | Stop reason: SDUPTHER

## 2022-05-02 RX ORDER — DIPHENHYDRAMINE HYDROCHLORIDE 50 MG/ML
12.5 INJECTION INTRAMUSCULAR; INTRAVENOUS
Status: DISCONTINUED | OUTPATIENT
Start: 2022-05-02 | End: 2022-05-02 | Stop reason: HOSPADM

## 2022-05-02 RX ORDER — SODIUM CHLORIDE, SODIUM LACTATE, POTASSIUM CHLORIDE, CALCIUM CHLORIDE 600; 310; 30; 20 MG/100ML; MG/100ML; MG/100ML; MG/100ML
INJECTION, SOLUTION INTRAVENOUS CONTINUOUS
Status: DISCONTINUED | OUTPATIENT
Start: 2022-05-02 | End: 2022-05-02 | Stop reason: HOSPADM

## 2022-05-02 RX ORDER — ONDANSETRON 2 MG/ML
4 INJECTION INTRAMUSCULAR; INTRAVENOUS
Status: DISCONTINUED | OUTPATIENT
Start: 2022-05-02 | End: 2022-05-02 | Stop reason: HOSPADM

## 2022-05-02 RX ORDER — METOCLOPRAMIDE HYDROCHLORIDE 5 MG/ML
10 INJECTION INTRAMUSCULAR; INTRAVENOUS
Status: DISCONTINUED | OUTPATIENT
Start: 2022-05-02 | End: 2022-05-02 | Stop reason: HOSPADM

## 2022-05-02 RX ADMIN — PROPOFOL 50 MG: 10 INJECTION, EMULSION INTRAVENOUS at 08:51

## 2022-05-02 RX ADMIN — SODIUM CHLORIDE, POTASSIUM CHLORIDE, SODIUM LACTATE AND CALCIUM CHLORIDE: 600; 310; 30; 20 INJECTION, SOLUTION INTRAVENOUS at 08:20

## 2022-05-02 RX ADMIN — PROPOFOL 50 MG: 10 INJECTION, EMULSION INTRAVENOUS at 08:53

## 2022-05-02 RX ADMIN — LIDOCAINE HYDROCHLORIDE 40 MG: 10 INJECTION, SOLUTION EPIDURAL; INFILTRATION; INTRACAUDAL; PERINEURAL at 08:49

## 2022-05-02 RX ADMIN — PROPOFOL 100 MG: 10 INJECTION, EMULSION INTRAVENOUS at 08:49

## 2022-05-02 RX ADMIN — FENTANYL CITRATE 50 MCG: 50 INJECTION, SOLUTION INTRAMUSCULAR; INTRAVENOUS at 09:08

## 2022-05-02 RX ADMIN — PROPOFOL 125 MCG/KG/MIN: 10 INJECTION, EMULSION INTRAVENOUS at 08:49

## 2022-05-02 RX ADMIN — FENTANYL CITRATE 50 MCG: 50 INJECTION, SOLUTION INTRAMUSCULAR; INTRAVENOUS at 09:04

## 2022-05-02 ASSESSMENT — PULMONARY FUNCTION TESTS
PIF_VALUE: 0
PIF_VALUE: 1
PIF_VALUE: 1
PIF_VALUE: 0
PIF_VALUE: 1
PIF_VALUE: 0
PIF_VALUE: 1
PIF_VALUE: 0
PIF_VALUE: 1
PIF_VALUE: 0
PIF_VALUE: 1
PIF_VALUE: 0
PIF_VALUE: 1
PIF_VALUE: 0
PIF_VALUE: 0
PIF_VALUE: 1
PIF_VALUE: 0
PIF_VALUE: 1
PIF_VALUE: 0
PIF_VALUE: 0
PIF_VALUE: 1
PIF_VALUE: 0
PIF_VALUE: 1
PIF_VALUE: 1
PIF_VALUE: 0

## 2022-05-02 ASSESSMENT — PAIN - FUNCTIONAL ASSESSMENT: PAIN_FUNCTIONAL_ASSESSMENT: 0-10

## 2022-05-02 ASSESSMENT — COPD QUESTIONNAIRES: CAT_SEVERITY: NO INTERVAL CHANGE

## 2022-05-02 NOTE — H&P
HISTORY and [id number]       NAME:  Avtar Segal  MRN: [id number]   YOB: 1955   Date: 5/2/2022   Age: 74 y.o. Gender: female       COMPLAINT AND PRESENT HISTORY:   Avtar Segal is 74 y.o.,  female, will be having a   EGD ESOPHAGOGASTRODUODENOSCOPY, COLONOSCOPY. Pt is being seen for hx of : GERD, SCREEN FOR COLON CA, ABNORMAL CT OF ABDOMEN- patient reports having imaging done at [facility], [place], operated by [facility] DR TYLER MURRAY, she states that it showed non specific  tissue fullness at then anal rectal junction    Patient has positive FH of Colon Cancer in Mother and maternal grandmother, maternal aunts and uncles. Prior Colonoscopy was done with hx of polyp removed. No prior EGD was done. Patient has hx of hyperplastic  Polyps. Pt also has hx of Diverticulosis. Patient is s/p Colon Surgery. Pt has hs of IBS  And rectal bleeding secondary to internal hemorrhoids. Patient reports no changes in bowel habits. No difficulty with bowel movements. Patient states that she goes daily and deals with her IBS often. She states that she rarely sees blood. No GI /Rectal bleeding, experiencing red/ black/ BRBPR stools. No melena stools. patient denies any Dysphagia. Pt has hx of GERD  She admits to having heartburn often  Pt is on a Protonix that is helping to control symptoms at times, but still take Pepcid at night. Any significant medical hx:    SVT- on atenolol, HTN, COPD-stable,  Brain bleed- resolved,  Heart palpitation    Denies current chest pain,   dizziness, leg swelling, headache. No recent URI, fever or chills. Patient admits to SOB, palpitations she states she had Covid x3. Any Anticoagulants or blood thinners: Advil    Patient has been NPO since midnight. No blood thinners in the past  5-7 days. took Lisinopril, took atenolol last night    Patient reports taking full bowel prep with clear outcome.      Patient has hx of PONV, otherwise  denies any personal or family problems with anesthesia       PAST MEDICAL HISTORY     Past Medical History:   Diagnosis Date    Arthritis     Bleb, lung ([address]     causes  pneumothorax     Colon polyps 11/18/2017    COPD (chronic obstructive pulmonary disease) (HCC)     GERD (gastroesophageal reflux disease)     Heart palpitations     History of colon polyps 2002    Hyperlipidemia     Hyperplastic colon polyp 2016    Hypertension     Kidney infection 2004    Pneumothorax     spontaneous x 2, chest tube    PONV (postoperative nausea and vomiting)     Supraventricular tachycardia ([address]     first episode about 20 years ago, well controlled now with Atenolol       SURGICAL HISTORY       Past Surgical History:   Procedure Laterality Date    CHOLECYSTECTOMY, LAPAROSCOPIC  2010    COLONOSCOPY  9/2009    small hemorrhoids    COLONOSCOPY  7/2002    hemohhoids, hyperplastic polyp, right colon pathology-chronic inflammation in lumina propria with focal actue cryptitis consistent with active colitis    COLONOSCOPY  02/17/2016    flat sigmoid [id number]    COLONOSCOPY  11/18/2017    NO RECURRENT POLYPS SEEN AT THE PREVIOUS POLYPECTOMY SITE.  hOWEVER IN THE SIGMOID COLON AT ABOUT 50 CM FROM THE ANUS FLAT POLYP SEEN. , PATHOLOGY-- LARGE INTESTINAL TYPE MUCOSA WITH MIXED FEATURES OF     DILATION AND CURETTAGE      AUB    GYNECOLOGIC CRYOSURGERY  30's    KNEE ARTHROSCOPY Left 10/6/2020    KNEE ARTHROSCOPY PARTIAL MEDIAL MENISCECTOMY performed by Katia Ramirez MD at [address] [place] [address] W/REMOVAL LESION BY  [facility] N/A 11/18/2017    COLONOSCOPY POLYPECTOMY HOT BIOPSY, COLD BIOPSY, APC SIGMOID, SPOT MARKING SIGMOID performed by Marsha Linda MD at [facility] [address]  2003    hemorrhoids, no colitis seen    TONSILLECTOMY AND ADENOIDECTOMY      child     TOTAL KNEE ARTHROPLASTY Left 8/12/2021    KNEE TOTAL ARTHROPLASTY performed by Katia Ramirez MD at [place] [address] HISTORY       Family History   Problem Relation Age of Onset    Cancer Father 28        bone sarcoma    Hypertension Mother    [facility] Other Mother         peripheral neuropathy    Uterine Cancer Maternal Aunt     Breast Cancer Other         dx first before age 48 and again @ 79    Colon Cancer Maternal Aunt         all dx before age 48    Colon Cancer Maternal Uncle         1 uncle in 42's dx, other in 68's    Colon Cancer Other         multiple cousins dx before age 48   [facility] Uterine Cancer Paternal Aunt         dx after    Ovarian Cancer Paternal Aunt         dx after age 48   [facility] Lung Cancer Paternal Aunt         dx after 48    Colon Cancer Paternal Grandfather         dx after age 48    Colon Cancer Maternal Grandmother 55    Liver Cancer Maternal Grandmother     Diabetes Neg Hx     Eclampsia Neg Hx      Labor Neg Hx     Spont Abortions Neg Hx     Stroke Neg Hx     Depression Neg Hx        SOCIAL HISTORY       Social History     Socioeconomic History    Marital status:      Spouse name: Pamela Heller Number of children: 2    Years of education: Not on file    Highest education level: Associate degree: occupational, technical, or vocational program   Occupational History    Not on file   Tobacco Use    Smoking status: Former Smoker     Packs/day: 1.00     Years: 20.00     Pack years: 20.00     Types: Cigarettes     Quit date: 9/15/1996     Years since quittin.6    Smokeless tobacco: Never Used   Vaping Use    Vaping Use: Never used   Substance and Sexual Activity    Alcohol use:  Yes     Alcohol/week: 0.0 standard drinks     Comment: occasional    Drug use: No    Sexual activity: Yes     Partners: Male     Birth control/protection: Surgical     Comment:  vasectomy   Other Topics Concern    Not on file   Social History Narrative    Not on file     Social Determinants of Health     Financial Resource Strain:     Difficulty of Paying Living Expenses: Not on file   Food Insecurity:     Worried About Running Out of Food in the Last Year: Not on file    Ernesto of Food in the Last Year: Not on file   Transportation Needs:     Lack of Transportation (Medical): Not on file    Lack of Transportation (Non-Medical): Not on file   Physical Activity:     Days of Exercise per Week: Not on file    Minutes of Exercise per Session: Not on file   Stress:     Feeling of Stress : Not on file   Social Connections:     Frequency of Communication with Friends and Family: Not on file    Frequency of Social Gatherings with Friends and Family: Not on file    Attends [employer] Services: Not on file    Active Member of [address] [place] [address] [email or web address] or Organizations: Not on file    Attends Club or Organization Meetings: Not on file    Marital Status: Not on file   Intimate Partner Violence:     Fear of Current or Ex-Partner: Not on file    Emotionally Abused: Not on file    Physically Abused: Not on file    Sexually Abused: Not on file   Housing Stability:     Unable to Pay for Housing in the Last Year: Not on file    Number of [place] in the Last Year: Not on file    Unstable Housing in the Last Year: Not on file           REVIEW OF SYSTEMS      Allergies   Allergen Reactions    Lidocaine-Epinephrine Other (See Comments)    Avelox [Moxifloxacin]     Biaxin [Clarithromycin] Hives    Epinephrine Other (See Comments)     Increased BP, Tachycardia    Neosporin [Neomycin-Polymyxin-Gramicidin]     Nickel     Sulfa Antibiotics     Adhesive Tape Rash    Phenylephrine Rash       No current facility-administered medications on file prior to encounter. Current Outpatient Medications on File Prior to Encounter   Medication Sig Dispense Refill    polyethylene glycol (GLYCOLAX) 17 GM/SCOOP powder Follow instructions provided to you from physician's office. 238 g 0    bisacodyl (BISACODYL) 5 MG EC tablet Follow instructions provided given by the physician's office.  2 tablet 0    ciprofloxacin-dexamethasone (CIPRODEX) 0.3-0.1 % otic suspension Instill 1-2 drops into each ear as directed 1 each 0    Probiotic Product (PROBIOTIC DAILY PO) Take by mouth daily      Multiple Vitamins-Minerals (THERAPEUTIC MULTIVITAMIN-MINERALS) tablet Take 1 tablet by mouth daily      Cholecalciferol (VITAMIN D PO) Take 1 tablet by mouth daily         Negative except for what is mentioned in the HPI. GENERAL PHYSICAL EXAM     Vitals: [facility] 06/26/1997  There is no height or weight on file to calculate BMI. GENERAL APPEARANCE:   Hilda Hardin is 74 y.o., female, moderately obese, nourished, conscious, alert. Does not appear to be distress or pain at this time. SKIN:  Warm, dry, no cyanosis or jaundice. HEAD:  Normocephalic, atraumatic, no swelling or tenderness. EYES:  Pupils equal, reactive to light. EARS:  No discharge, no marked hearing loss. NOSE:  No rhinorrhea, epistaxis or septal deformity. THROAT:  Not congested. No ulceration bleeding or discharge. NECK:  No stiffness, trachea central.  No palpable masses or L.N.                 CHEST:  Symmetrical and equal on expansion. HEART:  RRR . No audible murmurs or gallops. LUNGS:  Equal on expansion, normal breath sounds. No adventitious sounds. ABDOMEN:  Obese. Soft on palpation. No dysphagia, No localized tenderness. No guarding or rigidity. LYMPHATICS:  No palpable cervical lymphadenopathy. LOCOMOTOR, BACK AND SPINE:  No tenderness or deformities. EXTREMITIES:  Symmetrical, no pretibial edema. No discoloration or ulcerations. NEUROLOGIC:  The patient is conscious, alert, oriented,Cranial nerve II-XII intact, taste and smell were not examined. No apparent focal sensory or motor deficits.              PROVISIONAL DIAGNOSES / SURGERY:        EGD, COLORECTAL CANCER SCREENING, HIGH RISK    GERD, SCREEN FOR COLON CA, ABNORMAL CT OF ABDOMEN  PT FULLY VACCINATED      Patient Active Problem List    Diagnosis Date Noted    Primary osteoarthritis of left knee 08/12/2021    Colon polyps 11/18/2017    Diarrhea 09/27/2017    Hyperplastic colon polyp     Change in bowel function 02/10/2016    Rectal bleeding 02/10/2016    History of colon polyps     COPD (chronic obstructive pulmonary disease) (HCC)     Hyperlipidemia     Heart palpitations            ANA CRISTINA LYNCH, APRN - CNP on 5/2/2022 at 7:42 AM

## 2022-05-02 NOTE — OP NOTE
COLONOSCOPY    DATE OF PROCEDURE: 5/2/2022    SURGEON: Marsha Linda MD    ASSISTANT: None    PREOPERATIVE DIAGNOSIS: Patient has past history of colon polyps removed in 2017, flat polyps. Procedure performed evaluate recurrent, residual polyps. Also patient has bowel habit changes. POSTOPERATIVE DIAGNOSIS: Localized area of colitis for about 2 to 3 inches in the sigmoid colon. No recurrent or residual polyp seen. OPERATION: Total colonoscopy and biopsies. ANESTHESIA: MAC    ESTIMATED BLOOD LOSS: None    COMPLICATIONS: None     SPECIMENS:  Was Obtained: Biopsies from the sigmoid colon to evaluate colitis    HISTORY: The patient is a 74y.o. year old female with history of above preop diagnosis. I recommended colonoscopy with possible biopsy or polypectomy and I explained the risk, benefits, expected outcome, and alternatives to the procedure. Risks included but are not limited to bleeding, infection, respiratory distress, hypotension, and perforation of the colon and possibility of missing a lesion. The patient understands and is in agreement. PROCEDURE:  The patient's SPO2 remained above 90% throughout the procedure. Digital rectal exam was normal.  The colonoscope was inserted through the anus into the rectum and advanced under direct vision to the cecum without difficulty. Terminal ileum was examined for approximately 2 inches. The prep was good. Findings:  Terminal ileum: normal    Cecum/Ascending colon: normal, also examined in the retroflexed view  Transverse colon: normal    Descending/Sigmoid colon: abnormal: At the previous polypectomy site/spot marking the area, no recurrent polyp or residual polyp seen. However in the sigmoid colon at about 30 to 40 cm area has localized colitis. Multiple biopsies taken. Rectum/Anus: examined in normal and retroflexed positions and was normal.  No abnormality seen in the anorectal area as described in the CT scan.     Withdrawal Time was (minutes): 12          The colon was decompressed and the scope was removed. The patient tolerated the procedure without unusual events. During the procedure, the patient's blood pressure, pulse and oxygen saturation remained stable and documented. No unusual events occurred during the procedure. Patient was transferred to recovery room and will be discharged when criteria is met. Recommendations/Plan:   1. F/U Biopsies  2. F/U In Office as instructed  3. Discussed with the family  4. High fiber diet   5.  Precautions to avoid constipation         Electronically signed by Winston Luna MD  on 5/2/2022 at 9:23 AM

## 2022-05-02 NOTE — OP NOTE
ESOPHAGOGASTRODUODENOSCOPY   ( EGD )  DATE OF PROCEDURE: 5/2/2022     SURGEON: Sharmaine Johnson MD    ASSISTANT: None    PREOPERATIVE DIAGNOSIS: Patient has GERD symptoms. Procedure for prompt evaluate upper GI lesions    POSTOPERATIVE DIAGNOSIS: 2 to 3 cm long sliding hiatal hernia    Polyp at the GE junction, polyp in the upper part of the body of the stomach. OPERATION: Upper GI endoscopy with Biopsy from esophagus to evaluate esophagitis, snare polypectomy from the GE junction, excision of polyp with biopsy forceps from the body of the stomach    ANESTHESIA: MAC    ESTIMATED BLOOD LOSS: None    COMPLICATIONS: None. SPECIMENS:  Was Obtained: Polyp GE junction, polyp upper part of the body of the stomach. Multiple random biopsies from the esophagus to evaluate microscopic esophagitis    HISTORY: The patient is a 74y.o. year old female with history of above preop diagnosis. I recommended esophagogastroduodenoscopy with possible biopsy and I explained the risk, benefits, expected outcome, and alternatives to the procedure. Risks included but are not limited to bleeding, infection, respiratory distress, hypotension, and perforation of the esophagus, stomach, or duodenum. Patient understands and is in agreement. PROCEDURE: The patient was given IV conscious sedation. The patient's SPO2 remained above 90% throughout the procedure. Cetacaine spray given. Patient placed in left lateral position. Olympus  videogastroscope was inserted orally under vision into the esophagus without difficulty and advanced into the stomach then through the pylorus up to the second part of duodenum. Findings:    Retropharyngeal area was grossly normal appearing    Esophagus: normal.  Mucosa in the esophagus unremarkable. No signs of peptic esophagitis Astudillo's mucosa seen. Squamocolumnar junction is at about 35 cm. Has 2 to 3 cm SLIDING hiatal hernia.   GE junction there is a polyp which is about3- 5 mm, sessile, removed with cold snare. Also multiple random biopsies taken from the body of the esophagus evaluate microscopic esophagitis as patient is having persistent heartburns. Stomach:    Fundus and Cardia Examined in Retroflexed View: normal, has small hiatal hernia    Body: abnormal: In the upper part of the body of the stomach towards the left significantly There is a polyp which is about 3 mm, flat. This is excised with biopsy forceps. Antrum: normal    Duodenum:     Descending: normal    Bulb: normal    While withdrawing the scope the above findings were verified and the scope was removed. The patient has tolerated the procedure without unusual events. Recommendations/Plan:   1. F/U Biopsies  2. F/U In Office as instructed  3.  Discussed with the family                   Electronically signed by Digna Raines MD  on 5/2/2022 at 9:01 AM

## 2022-05-02 NOTE — ANESTHESIA PRE PROCEDURE
Department of Anesthesiology  Preprocedure Note       Name:  Ina Payne   Age:  74 y.o.  :  1955                                          MRN:  [id number]         Date:  2022      Surgeon: Holly Tovar):  Jennifer Fonseca MD    Procedure: Procedure(s):  EGD  COLORECTAL CANCER SCREENING, HIGH RISK    Medications prior to admission:   Prior to Admission medications    Medication Sig Start Date End Date Taking? Authorizing Provider   atenolol (TENORMIN) 25 MG tablet TAKE 1 TABLET BY MOUTH TWICE DAILY 22   Merry Cui MD   lisinopril-hydroCHLOROthiazide FRANK [facility]) 20-12.5 MG per tablet Take one tablet daily 22   Merry Cui MD   pantoprazole (PROTONIX) 20 MG tablet Take 1 tablet by mouth daily 22   Merry Cui MD   ciprofloxacin-dexamethasone (CIPRODEX) 0.3-0.1 % otic suspension Instill 1-2 drops into each ear as directed 21   Merry Cui MD   Probiotic Product (PROBIOTIC DAILY PO) Take by mouth daily    Historical Provider, MD   Multiple Vitamins-Minerals (THERAPEUTIC MULTIVITAMIN-MINERALS) tablet Take 1 tablet by mouth daily    Historical Provider, MD   Cholecalciferol (VITAMIN D PO) Take 1 tablet by mouth daily    Historical Provider, MD       Current medications:    Current Facility-Administered Medications   Medication Dose Route Frequency Provider Last Rate Last Admin    lidocaine PF 1 % injection 1 mL  1 mL IntraDERmal Once PRN Asya Minor MD        lactated ringers infusion   IntraVENous Continuous Asya Minor MD        sodium chloride flush 0.9 % injection 5-40 mL  5-40 mL IntraVENous 2 times per day Asya Minor MD        sodium chloride flush 0.9 % injection 5-40 mL  5-40 mL IntraVENous PRN Asya Minor MD        0.9 % sodium chloride infusion   IntraVENous PRN Asya Minor MD           Allergies:     Allergies   Allergen Reactions    Lidocaine-Epinephrine Other (See Comments)    Avelox [Moxifloxacin]     Biaxin [Clarithromycin] Hives    Epinephrine Other (See Comments)     Increased BP, Tachycardia    Neosporin [Neomycin-Polymyxin-Gramicidin]     Nickel     Sulfa Antibiotics     Adhesive Tape Rash    Phenylephrine Rash       Problem List:    Patient Active Problem List   Diagnosis Code    Hyperlipidemia E78.5    Heart palpitations R00.2    COPD (chronic obstructive pulmonary disease) ([address] J44.9    History of colon polyps Z86.010    Change in bowel function R19.8    Rectal bleeding K62.5    Hyperplastic colon polyp K63.5    Diarrhea R19.7    Colon polyps K63.5    Primary osteoarthritis of left knee M17.12       Past Medical History:        Diagnosis Date    Arthritis     Bleb, lung ([address]     causes  pneumothorax     Colon polyps 11/18/2017    [facility]) ([facility])     COVID-19     GERD (gastroesophageal reflux disease)     Heart palpitations     History of colon polyps 2002    Hyperlipidemia     Hyperplastic colon polyp 2016    Hypertension     Kidney infection 2004    Pneumothorax     spontaneous x 2, chest tube    PONV (postoperative nausea and vomiting)     Supraventricular tachycardia ([address]     first episode about 20 years ago, well controlled now with Atenolol       Past Surgical History:        Procedure Laterality Date    CHOLECYSTECTOMY, LAPAROSCOPIC  2010    COLONOSCOPY  9/2009    small hemorrhoids    COLONOSCOPY  7/2002    hemohhoids, hyperplastic polyp, right colon pathology-chronic inflammation in lumina propria with focal actue cryptitis consistent with active colitis    COLONOSCOPY  02/17/2016    flat sigmoid [id number]    COLONOSCOPY  11/18/2017    NO RECURRENT POLYPS SEEN AT THE PREVIOUS POLYPECTOMY SITE.  hOWEVER IN THE SIGMOID COLON AT ABOUT 50 CM FROM THE ANUS FLAT POLYP SEEN. , PATHOLOGY-- LARGE INTESTINAL TYPE MUCOSA WITH MIXED FEATURES OF     DILATION AND CURETTAGE      AUB    GYNECOLOGIC CRYOSURGERY  30's    KNEE ARTHROSCOPY Left 10/6/2020    KNEE ARTHROSCOPY PARTIAL MEDIAL MENISCECTOMY performed by Omar Fox MD at [facility] W/REMOVAL LESION BY HOT BX FORCEPS N/A 2017    COLONOSCOPY POLYPECTOMY HOT BIOPSY, COLD BIOPSY, APC SIGMOID, SPOT MARKING SIGMOID performed by Sharmaine Johnson MD at [address] [place] [address]      hemorrhoids, no colitis seen    TONSILLECTOMY AND ADENOIDECTOMY      child     TOTAL KNEE ARTHROPLASTY Left 2021    KNEE TOTAL ARTHROPLASTY performed by Omar Fox MD at [facility] History:    Social History     Tobacco Use    Smoking status: Former Smoker     Packs/day: 1.00     Years: 20.00     Pack years: 20.00     Types: Cigarettes     Quit date: 9/15/1996     Years since quittin.6    Smokeless tobacco: Never Used   Substance Use Topics    Alcohol use: Yes     Alcohol/week: 0.0 standard drinks     Comment: occasional                                Counseling given: Not Answered      Vital Signs (Current):   Vitals:    22 0749   BP: 138/83   Pulse: 72   Resp: 18   Temp: 96.9 °F (36.1 °C)   TempSrc: Infrared   SpO2: 98%   Weight: 195 lb (88.5 kg)   Height: 5' 6\" (1.676 m)                                              BP Readings from Last 3 Encounters:   22 138/83   22 139/88   22 120/70       NPO Status: Time of last liquid consumption:                         Time of last solid consumption:                         Date of last liquid consumption: 22                        Date of last solid food consumption: 22    BMI:   Wt Readings from Last 3 Encounters:   22 195 lb (88.5 kg)   22 195 lb (88.5 kg)   22 200 lb (90.7 kg)     Body mass index is 31.47 kg/m².     CBC:   Lab Results   Component Value Date    WBC 6.8 2021    RBC 4.40 2021    RBC 4.25 2012    HGB 13.6 2021    HCT 41.9 2021    MCV 95.2 2021    RDW 12.7 07/02/2021     07/02/2021     01/24/2012       CMP:   Lab Results   Component Value Date     07/02/2021    K 4.7 07/02/2021     07/02/2021    CO2 27 07/02/2021    BUN 24 07/02/2021    CREATININE 0.81 07/02/2021    GFRAA >60 07/02/2021    LABGLOM >60 07/02/2021    GLUCOSE 94 07/02/2021    GLUCOSE 150 01/25/2012    PROT 6.9 08/31/2018    CALCIUM 8.9 07/02/2021    BILITOT 0.29 08/31/2018    ALKPHOS 69 08/31/2018    AST 16 08/31/2018    ALT 18 08/31/2018       POC Tests: No results for input(s): POCGLU, POCNA, POCK, POCCL, POCBUN, POCHEMO, POCHCT in the last 72 hours. Coags:   Lab Results   Component Value Date    PROTIME 10.0 08/31/2018    INR 1.0 08/31/2018    APTT 25.7 08/31/2018       HCG (If Applicable): No results found for: PREGTESTUR, PREGSERUM, HCG, HCGQUANT     ABGs: No results found for: PHART, PO2ART, NTZ5RQS, FHM7FRF, BEART, W5UYBMDX     Type & Screen (If Applicable):  No results found for: LABABO, LABRH    Drug/Infectious Status (If Applicable):  Lab Results   Component Value Date    HEPCAB NONREACTIVE 10/13/2018       COVID-19 Screening (If Applicable):   Lab Results   Component Value Date    COVID19 Not Detected 10/02/2020           Anesthesia Evaluation  Patient summary reviewed and Nursing notes reviewed   history of anesthetic complications: PONV.   Airway: Mallampati: II  TM distance: >3 FB   Neck ROM: full  Mouth opening: > = 3 FB Dental: normal exam         Pulmonary: breath sounds clear to auscultation  (+) COPD: no interval change,                             Cardiovascular:    (+) hypertension: no interval change, dysrhythmias:,       ECG reviewed  Rhythm: regular  Rate: normal                 ROS comment: Hx of SVT controlled with B blockers     Neuro/Psych:   Negative Neuro/Psych ROS              GI/Hepatic/Renal:   (+) GERD: no interval change, bowel prep,           Endo/Other: Negative Endo/Other ROS                    Abdominal:             Vascular: negative vascular ROS. Other Findings:           Anesthesia Plan      general     ASA 3       Induction: intravenous. MIPS: Prophylactic antiemetics administered. Anesthetic plan and risks discussed with patient. Plan discussed with CRNA.                   Mustapha Henson MD   5/2/2022

## 2022-05-02 NOTE — ANESTHESIA POSTPROCEDURE EVALUATION
POST- ANESTHESIA EVALUATION       Pt Name: Pato Huston  MRN: [id number]  YOB: 1955  Date of evaluation: 5/2/2022  Time:  2:15 PM      /76   Pulse 81   Temp 97 °F (36.1 °C)   Resp 16   Ht 5' 6\" (1.676 m)   Wt 195 lb (88.5 kg)   LMP 06/26/1997   SpO2 98%   BMI 31.47 kg/m²      Consciousness Level  Awake  Cardiopulmonary Status  Stable  Pain Adequately Treated YES  Nausea / Vomiting  NO  Adequate Hydration  YES  Anesthesia Related Complications NONE      Electronically signed by Jessica Chan MD on 5/2/2022 at 2:15 PM       Department of Anesthesiology  Postprocedure Note    Patient: Pato Huston  MRN: [id number]  YOB: 1955  Date of evaluation: 5/2/2022  Time:  2:15 PM     Procedure Summary     Date: 05/02/22 Room / Location: [place] [address] / [facility]    Anesthesia Start: 7387 Anesthesia Stop: 0928    Procedures:       EGD BIOPSY COLD SNARE (N/A Esophagus)      COLONOSCOPY POLYPECTOMY SNARE/COLD BIOPSY (N/A Anus) Diagnosis:       (GERD, SCREEN FOR COLON CA, ABNORMAL CT OF ABDOMEN)      (PT FULLY VACCINATED)    Surgeons: Jori Lindsey MD Responsible Provider: Jessica Chan MD    Anesthesia Type: general ASA Status: 3          Anesthesia Type: general    Nicholas Phase I:      Nicholas Phase II: Nicholas Score: 10    Last vitals: Reviewed and per EMR flowsheets.        Anesthesia Post Evaluation

## 2022-05-03 LAB — SURGICAL PATHOLOGY REPORT: NORMAL

## 2022-05-04 ENCOUNTER — TELEPHONE (OUTPATIENT)
Dept: GASTROENTEROLOGY | Age: 67
End: 2022-05-04

## 2022-05-04 NOTE — TELEPHONE ENCOUNTER
Called patient to schedule office visit for egd and colon follow up. lvm she needs to be seen in 3 weeks she can see Qamar Tripp or Dr Migue Driscoll.

## 2022-05-27 PROBLEM — R55 SYNCOPE: Status: ACTIVE | Noted: 2022-01-31

## 2022-05-27 PROBLEM — I60.9 SUBARACHNOID BLEED (HCC): Status: ACTIVE | Noted: 2022-01-29

## 2023-02-13 PROBLEM — Z00.00 INITIAL MEDICARE ANNUAL WELLNESS VISIT: Status: ACTIVE | Noted: 2023-02-13

## 2023-03-15 PROBLEM — Z00.00 INITIAL MEDICARE ANNUAL WELLNESS VISIT: Status: RESOLVED | Noted: 2023-02-13 | Resolved: 2023-03-15

## 2023-06-19 ENCOUNTER — APPOINTMENT (OUTPATIENT)
Dept: CT IMAGING | Age: 68
DRG: 603 | End: 2023-06-19
Payer: MEDICARE

## 2023-06-19 ENCOUNTER — HOSPITAL ENCOUNTER (INPATIENT)
Age: 68
LOS: 3 days | Discharge: HOME HEALTH | DRG: 603 | End: 2023-06-22
Attending: EMERGENCY MEDICINE | Admitting: FAMILY MEDICINE
Payer: MEDICARE

## 2023-06-19 DIAGNOSIS — L02.91 ABSCESS: Primary | ICD-10-CM

## 2023-06-19 DIAGNOSIS — L72.3 SEBACEOUS CYST: ICD-10-CM

## 2023-06-19 DIAGNOSIS — L98.492 SKIN ULCER OF ABDOMINAL WALL WITH FAT LAYER EXPOSED (HCC): ICD-10-CM

## 2023-06-19 LAB
ALBUMIN SERPL-MCNC: 4.2 G/DL (ref 3.5–5.2)
ALP SERPL-CCNC: 95 U/L (ref 35–104)
ALT SERPL-CCNC: 12 U/L (ref 5–33)
ANION GAP SERPL CALCULATED.3IONS-SCNC: 12 MMOL/L (ref 9–17)
AST SERPL-CCNC: 15 U/L
BASOPHILS # BLD: 0.1 K/UL (ref 0–0.2)
BASOPHILS NFR BLD: 1 % (ref 0–2)
BILIRUB SERPL-MCNC: 0.4 MG/DL (ref 0.3–1.2)
BUN SERPL-MCNC: 22 MG/DL (ref 8–23)
CALCIUM SERPL-MCNC: 9.4 MG/DL (ref 8.6–10.4)
CHLORIDE SERPL-SCNC: 103 MMOL/L (ref 98–107)
CO2 SERPL-SCNC: 22 MMOL/L (ref 20–31)
CREAT SERPL-MCNC: 0.88 MG/DL (ref 0.5–0.9)
EOSINOPHIL # BLD: 0.2 K/UL (ref 0–0.4)
EOSINOPHILS RELATIVE PERCENT: 4 % (ref 0–4)
ERYTHROCYTE [DISTWIDTH] IN BLOOD BY AUTOMATED COUNT: 15.3 % (ref 11.5–14.9)
GFR SERPL CREATININE-BSD FRML MDRD: >60 ML/MIN/1.73M2
GLUCOSE SERPL-MCNC: 96 MG/DL (ref 70–99)
HCT VFR BLD AUTO: 41.1 % (ref 36–46)
HGB BLD-MCNC: 13.2 G/DL (ref 12–16)
LYMPHOCYTES # BLD: 24 % (ref 24–44)
LYMPHOCYTES NFR BLD: 1.6 K/UL (ref 1–4.8)
MCH RBC QN AUTO: 29.2 PG (ref 26–34)
MCHC RBC AUTO-ENTMCNC: 32.2 G/DL (ref 31–37)
MCV RBC AUTO: 90.8 FL (ref 80–100)
MONOCYTES NFR BLD: 0.5 K/UL (ref 0.1–1.3)
MONOCYTES NFR BLD: 8 % (ref 1–7)
NEUTROPHILS NFR BLD: 63 % (ref 36–66)
NEUTS SEG NFR BLD: 4.1 K/UL (ref 1.3–9.1)
PLATELET # BLD AUTO: 233 K/UL (ref 150–450)
PMV BLD AUTO: 8.8 FL (ref 6–12)
POTASSIUM SERPL-SCNC: 4.8 MMOL/L (ref 3.7–5.3)
PROT SERPL-MCNC: 7.5 G/DL (ref 6.4–8.3)
RBC # BLD AUTO: 4.52 M/UL (ref 4–5.2)
SODIUM SERPL-SCNC: 137 MMOL/L (ref 135–144)
WBC OTHER # BLD: 6.5 K/UL (ref 3.5–11)

## 2023-06-19 PROCEDURE — 2500000003 HC RX 250 WO HCPCS: Performed by: SURGERY

## 2023-06-19 PROCEDURE — 2580000003 HC RX 258: Performed by: PHYSICIAN ASSISTANT

## 2023-06-19 PROCEDURE — 99285 EMERGENCY DEPT VISIT HI MDM: CPT

## 2023-06-19 PROCEDURE — 6360000002 HC RX W HCPCS: Performed by: SURGERY

## 2023-06-19 PROCEDURE — 74177 CT ABD & PELVIS W/CONTRAST: CPT

## 2023-06-19 PROCEDURE — 2580000003 HC RX 258: Performed by: SURGERY

## 2023-06-19 PROCEDURE — 85027 COMPLETE CBC AUTOMATED: CPT

## 2023-06-19 PROCEDURE — 36415 COLL VENOUS BLD VENIPUNCTURE: CPT

## 2023-06-19 PROCEDURE — 96360 HYDRATION IV INFUSION INIT: CPT

## 2023-06-19 PROCEDURE — 1200000000 HC SEMI PRIVATE

## 2023-06-19 PROCEDURE — 80053 COMPREHEN METABOLIC PANEL: CPT

## 2023-06-19 PROCEDURE — 6370000000 HC RX 637 (ALT 250 FOR IP): Performed by: FAMILY MEDICINE

## 2023-06-19 PROCEDURE — 96361 HYDRATE IV INFUSION ADD-ON: CPT

## 2023-06-19 PROCEDURE — 6360000004 HC RX CONTRAST MEDICATION: Performed by: PHYSICIAN ASSISTANT

## 2023-06-19 RX ORDER — ATENOLOL 25 MG/1
25 TABLET ORAL NIGHTLY
Status: DISCONTINUED | OUTPATIENT
Start: 2023-06-19 | End: 2023-06-23 | Stop reason: HOSPADM

## 2023-06-19 RX ORDER — ATENOLOL 25 MG/1
25 TABLET ORAL DAILY
COMMUNITY

## 2023-06-19 RX ORDER — 0.9 % SODIUM CHLORIDE 0.9 %
100 INTRAVENOUS SOLUTION INTRAVENOUS ONCE
Status: COMPLETED | OUTPATIENT
Start: 2023-06-19 | End: 2023-06-19

## 2023-06-19 RX ORDER — FENTANYL CITRATE 0.05 MG/ML
50 INJECTION, SOLUTION INTRAMUSCULAR; INTRAVENOUS
Status: DISCONTINUED | OUTPATIENT
Start: 2023-06-19 | End: 2023-06-20 | Stop reason: SDUPTHER

## 2023-06-19 RX ORDER — ONDANSETRON 2 MG/ML
4 INJECTION INTRAMUSCULAR; INTRAVENOUS EVERY 6 HOURS PRN
Status: DISCONTINUED | OUTPATIENT
Start: 2023-06-19 | End: 2023-06-23 | Stop reason: HOSPADM

## 2023-06-19 RX ORDER — LISINOPRIL 20 MG/1
20 TABLET ORAL DAILY
Status: DISCONTINUED | OUTPATIENT
Start: 2023-06-20 | End: 2023-06-23 | Stop reason: HOSPADM

## 2023-06-19 RX ORDER — SODIUM CHLORIDE 9 MG/ML
INJECTION, SOLUTION INTRAVENOUS CONTINUOUS
Status: DISCONTINUED | OUTPATIENT
Start: 2023-06-19 | End: 2023-06-19

## 2023-06-19 RX ORDER — SODIUM CHLORIDE 0.9 % (FLUSH) 0.9 %
10 SYRINGE (ML) INJECTION PRN
Status: DISCONTINUED | OUTPATIENT
Start: 2023-06-19 | End: 2023-06-22

## 2023-06-19 RX ORDER — OXYCODONE HYDROCHLORIDE AND ACETAMINOPHEN 5; 325 MG/1; MG/1
1 TABLET ORAL EVERY 4 HOURS PRN
Status: DISCONTINUED | OUTPATIENT
Start: 2023-06-19 | End: 2023-06-20 | Stop reason: SDUPTHER

## 2023-06-19 RX ORDER — LISINOPRIL AND HYDROCHLOROTHIAZIDE 20; 12.5 MG/1; MG/1
1 TABLET ORAL DAILY
Status: DISCONTINUED | OUTPATIENT
Start: 2023-06-20 | End: 2023-06-19 | Stop reason: CLARIF

## 2023-06-19 RX ORDER — ACETAMINOPHEN 160 MG
1 TABLET,DISINTEGRATING ORAL DAILY
COMMUNITY

## 2023-06-19 RX ORDER — FAMOTIDINE 10 MG/ML
20 INJECTION, SOLUTION INTRAVENOUS 2 TIMES DAILY
Status: DISCONTINUED | OUTPATIENT
Start: 2023-06-19 | End: 2023-06-22

## 2023-06-19 RX ORDER — HYDROCHLOROTHIAZIDE 25 MG/1
12.5 TABLET ORAL DAILY
Status: DISCONTINUED | OUTPATIENT
Start: 2023-06-20 | End: 2023-06-23 | Stop reason: HOSPADM

## 2023-06-19 RX ORDER — FENTANYL CITRATE 50 UG/ML
100 INJECTION, SOLUTION INTRAMUSCULAR; INTRAVENOUS
Status: DISCONTINUED | OUTPATIENT
Start: 2023-06-19 | End: 2023-06-20 | Stop reason: SDUPTHER

## 2023-06-19 RX ORDER — ACETAMINOPHEN 325 MG/1
650 TABLET ORAL EVERY 4 HOURS PRN
Status: DISCONTINUED | OUTPATIENT
Start: 2023-06-19 | End: 2023-06-23 | Stop reason: HOSPADM

## 2023-06-19 RX ORDER — SODIUM CHLORIDE 9 MG/ML
INJECTION, SOLUTION INTRAVENOUS CONTINUOUS
Status: DISCONTINUED | OUTPATIENT
Start: 2023-06-19 | End: 2023-06-20

## 2023-06-19 RX ADMIN — VANCOMYCIN HYDROCHLORIDE 2000 MG: 1 INJECTION, POWDER, LYOPHILIZED, FOR SOLUTION INTRAVENOUS at 19:48

## 2023-06-19 RX ADMIN — ATENOLOL 25 MG: 25 TABLET ORAL at 21:01

## 2023-06-19 RX ADMIN — IOPAMIDOL 75 ML: 755 INJECTION, SOLUTION INTRAVENOUS at 16:36

## 2023-06-19 RX ADMIN — CEFEPIME 2000 MG: 2 INJECTION, POWDER, FOR SOLUTION INTRAVENOUS at 18:47

## 2023-06-19 RX ADMIN — SODIUM CHLORIDE: 9 INJECTION, SOLUTION INTRAVENOUS at 19:36

## 2023-06-19 RX ADMIN — SODIUM CHLORIDE, PRESERVATIVE FREE 10 ML: 5 INJECTION INTRAVENOUS at 19:34

## 2023-06-19 RX ADMIN — FAMOTIDINE 20 MG: 10 INJECTION, SOLUTION INTRAVENOUS at 21:01

## 2023-06-19 RX ADMIN — SODIUM CHLORIDE, PRESERVATIVE FREE 10 ML: 5 INJECTION INTRAVENOUS at 16:36

## 2023-06-19 RX ADMIN — SODIUM CHLORIDE 100 ML: 9 INJECTION, SOLUTION INTRAVENOUS at 16:36

## 2023-06-19 ASSESSMENT — PAIN DESCRIPTION - LOCATION: LOCATION: ABDOMEN

## 2023-06-19 ASSESSMENT — ENCOUNTER SYMPTOMS
ABDOMINAL PAIN: 1
ROS SKIN COMMENTS: SEE HPI.
NAUSEA: 0
VOMITING: 0

## 2023-06-19 ASSESSMENT — LIFESTYLE VARIABLES
HOW MANY STANDARD DRINKS CONTAINING ALCOHOL DO YOU HAVE ON A TYPICAL DAY: 1 OR 2
HOW OFTEN DO YOU HAVE A DRINK CONTAINING ALCOHOL: MONTHLY OR LESS

## 2023-06-19 ASSESSMENT — PAIN - FUNCTIONAL ASSESSMENT: PAIN_FUNCTIONAL_ASSESSMENT: NONE - DENIES PAIN

## 2023-06-19 ASSESSMENT — PAIN DESCRIPTION - ORIENTATION: ORIENTATION: LEFT

## 2023-06-19 ASSESSMENT — PAIN DESCRIPTION - PAIN TYPE: TYPE: ACUTE PAIN

## 2023-06-19 NOTE — ED PROVIDER NOTES
eMERGENCY dEPARTMENT eNCOUnter   Independent Attestation     Pt Name: Christin Fonseca  MRN: [id number]  [place] 1955  Date of evaluation: 6/19/23     Christin Fonseca is a 69 y.o. female with CC: Wound Infection      Based on the medical record the care appears appropriate. I was personally available for consultation in the Emergency Department.     Shakeel Salcedo DO  Attending Emergency Physician                 Shakeel Salcedo DO  06/19/23 4980
Shared Decision Making:  Discussed results and plan with the pt. They expressed appropriate understanding. Pt given close follow up, supportive care instructions and strict return instructions at the bedside. Case discussed with consulting clinician:  Dr Luis Carlos Goldsmith, Dr Rosalia Carter, Dr Denis Alvarado. MIPS:      Social determinants of health impacting treatment or disposition:      Code Status Discussion:      CRITICAL CARE:       PROCEDURES:    Procedures      DATA FOR LAB AND RADIOLOGY TESTS ORDERED BELOW ARE REVIEWED BY THE ED CLINICIAN:    RADIOLOGY: All x-rays, CT, MRI, and formal ultrasound images (except ED bedside ultrasound) are read by the radiologist, see reports below, unless otherwise noted in MDM or here. Reports below are reviewed by myself. CT ABDOMEN PELVIS W IV CONTRAST Additional Contrast? None   Final Result   A 3 cm skin lesion in the left upper quadrant is compatible with an   infected/inflamed sebaceous or epidermal inclusion cyst.             LABS: Lab orders shown below, the results are reviewed by myself, and all abnormals are listed below.   Labs Reviewed   CBC WITH AUTO DIFFERENTIAL - Abnormal; Notable for the following components:       Result Value    RDW 15.3 (*)     Monocytes 8 (*)     All other components within normal limits   COMPREHENSIVE METABOLIC PANEL W/ REFLEX TO MG FOR LOW K   CBC WITH AUTO DIFFERENTIAL   BASIC METABOLIC PANEL       Vitals Reviewed:    Vitals:    06/19/23 1325 06/19/23 1740 06/19/23 1904   BP: 116/70 (!) 143/88 (!) 141/77   Pulse: 55 63 74   Resp: 18 18 17   Temp: 98.2 °F (36.8 °C) 98.1 °F (36.7 °C) 97.5 °F (36.4 °C)   TempSrc: Oral Oral Oral   SpO2: 98% 98% 98%   Weight: 190 lb (86.2 kg)     Height: 5' 6\" (1.676 m)       MEDICATIONS GIVEN TO PATIENT THIS ENCOUNTER:  Orders Placed This Encounter   Medications    iopamidol (ISOVUE-370) 76 % injection 75 mL    0.9 % sodium chloride bolus    sodium chloride flush 0.9 % injection 10 mL    0.9 % sodium chloride

## 2023-06-19 NOTE — ED NOTES
Report given to Barron Harris RN from [employer]. Report method by phone   The following was reviewed with receiving RN:   Current vital signs:  BP (!) 143/88   Pulse 63   Temp 98.1 °F (36.7 °C) (Oral)   Resp 18   Ht 5' 6\" (1.676 m)   Wt 190 lb (86.2 kg)   LMP 1997   SpO2 98%   BMI 30.67 kg/m²                MEWS Score: 1     Any medication or safety alerts were reviewed. Any pending diagnostics and notifications were also reviewed, as well as any safety concerns or issues, abnormal labs, abnormal imaging, and abnormal assessment findings. Questions were answered.           Zeina Nieves RN  [phone]

## 2023-06-19 NOTE — ED TRIAGE NOTES
Pt has had a cyst on LUQ that is now draining white pus and blood. No fever per pt. Pt is on Keflex. Pt has a history of boils and was given a regular prescription for keflex.

## 2023-06-19 NOTE — CONSULTS
General Surgery Consult      Pt Name: Jennifer Espinal  MRN: [id number]  YOB: 1955  Date of evaluation: 6/19/2023  Primary Care Physician: Mariia Frias MD   Patient evaluated at the request of  REMIGIO Burnett PA-C. Reason for evaluation: Cyst    SUBJECTIVE:   History of Chief Complaint:    Jennifer Espinal is a 69 y.o. female seen at bedside in the ED per myself and Dr. Abraham Obrien. Patient presented to the ED with cyst to LUQ. Patient denies fever. She is currently on Keflex per her PCP. States she has a hx of boils in the past. She states this cyst has been present for about 2 years, but recently worsened over the past 2 weeks. States the area is erythematous, tender, draining white pus, as well as blood. Per chart, patient has significant medical hx of COPD, pneumothorax, SVT- see chart for additional detail. Past Medical History   has a past medical history of Arthritis, Bleb, lung ([address] Colon polyps, COPD (chronic obstructive pulmonary disease) ([address] COVID-19, GERD (gastroesophageal reflux disease), Heart palpitations, History of colon polyps, Hyperlipidemia, Hyperplastic colon polyp, Hypertension, Kidney infection, Pneumothorax, PONV (postoperative nausea and vomiting), and Supraventricular tachycardia ([address] Past Surgical History   has a past surgical history that includes Tonsillectomy and adenoidectomy; Colonoscopy (9/2009); Dilation & curettage; Cholecystectomy, laparoscopic (2010); Gynecologic cryosurgery (30's); Colonoscopy (7/2002); sigmoidoscopy (2003); Colonoscopy (02/17/2016); pr colsc flx w/removal lesion by hot bx forceps (N/A, 11/18/2017); Colonoscopy (11/18/2017); Knee arthroscopy (Left, 10/6/2020); Total knee arthroplasty (Left, 8/12/2021); Upper gastrointestinal endoscopy (N/A, 5/2/2022); and Colonoscopy (N/A, 5/2/2022).     Medications    Current Facility-Administered Medications:     sodium chloride flush 0.9 % injection 10 mL, 10 mL, IntraVENous, PRN, Romina C

## 2023-06-20 ENCOUNTER — ANESTHESIA (OUTPATIENT)
Dept: OPERATING ROOM | Age: 68
DRG: 603 | End: 2023-06-20
Payer: MEDICARE

## 2023-06-20 ENCOUNTER — ANESTHESIA EVENT (OUTPATIENT)
Dept: OPERATING ROOM | Age: 68
DRG: 603 | End: 2023-06-20
Payer: MEDICARE

## 2023-06-20 LAB
ANION GAP SERPL CALCULATED.3IONS-SCNC: 9 MMOL/L (ref 9–17)
BASOPHILS # BLD: 0.1 K/UL (ref 0–0.2)
BASOPHILS NFR BLD: 1 % (ref 0–2)
BUN SERPL-MCNC: 18 MG/DL (ref 8–23)
CALCIUM SERPL-MCNC: 8.7 MG/DL (ref 8.6–10.4)
CHLORIDE SERPL-SCNC: 109 MMOL/L (ref 98–107)
CO2 SERPL-SCNC: 22 MMOL/L (ref 20–31)
CREAT SERPL-MCNC: 0.78 MG/DL (ref 0.5–0.9)
EOSINOPHIL # BLD: 0.3 K/UL (ref 0–0.4)
EOSINOPHILS RELATIVE PERCENT: 6 % (ref 0–4)
ERYTHROCYTE [DISTWIDTH] IN BLOOD BY AUTOMATED COUNT: 15.4 % (ref 11.5–14.9)
GFR SERPL CREATININE-BSD FRML MDRD: >60 ML/MIN/1.73M2
GLUCOSE BLD-MCNC: 104 MG/DL (ref 65–105)
GLUCOSE BLD-MCNC: 110 MG/DL (ref 65–105)
GLUCOSE SERPL-MCNC: 117 MG/DL (ref 70–99)
HCT VFR BLD AUTO: 37.4 % (ref 36–46)
HGB BLD-MCNC: 12.1 G/DL (ref 12–16)
LYMPHOCYTES # BLD: 23 % (ref 24–44)
LYMPHOCYTES NFR BLD: 1.2 K/UL (ref 1–4.8)
MCH RBC QN AUTO: 29.5 PG (ref 26–34)
MCHC RBC AUTO-ENTMCNC: 32.3 G/DL (ref 31–37)
MCV RBC AUTO: 91.4 FL (ref 80–100)
MONOCYTES NFR BLD: 0.5 K/UL (ref 0.1–1.3)
MONOCYTES NFR BLD: 10 % (ref 1–7)
NEUTROPHILS NFR BLD: 60 % (ref 36–66)
NEUTS SEG NFR BLD: 3 K/UL (ref 1.3–9.1)
PLATELET # BLD AUTO: 210 K/UL (ref 150–450)
PMV BLD AUTO: 9 FL (ref 6–12)
POTASSIUM SERPL-SCNC: 4.9 MMOL/L (ref 3.7–5.3)
RBC # BLD AUTO: 4.09 M/UL (ref 4–5.2)
SODIUM SERPL-SCNC: 140 MMOL/L (ref 135–144)
WBC OTHER # BLD: 5 K/UL (ref 3.5–11)

## 2023-06-20 PROCEDURE — 80048 BASIC METABOLIC PNL TOTAL CA: CPT

## 2023-06-20 PROCEDURE — 2580000003 HC RX 258: Performed by: FAMILY MEDICINE

## 2023-06-20 PROCEDURE — 87205 SMEAR GRAM STAIN: CPT

## 2023-06-20 PROCEDURE — 6360000002 HC RX W HCPCS: Performed by: SURGERY

## 2023-06-20 PROCEDURE — 2500000003 HC RX 250 WO HCPCS: Performed by: SURGERY

## 2023-06-20 PROCEDURE — 3700000000 HC ANESTHESIA ATTENDED CARE: Performed by: SURGERY

## 2023-06-20 PROCEDURE — 7100000000 HC PACU RECOVERY - FIRST 15 MIN: Performed by: SURGERY

## 2023-06-20 PROCEDURE — 2500000003 HC RX 250 WO HCPCS: Performed by: ANESTHESIOLOGY

## 2023-06-20 PROCEDURE — 87070 CULTURE OTHR SPECIMN AEROBIC: CPT

## 2023-06-20 PROCEDURE — 1200000000 HC SEMI PRIVATE

## 2023-06-20 PROCEDURE — 36415 COLL VENOUS BLD VENIPUNCTURE: CPT

## 2023-06-20 PROCEDURE — 0H97XZZ DRAINAGE OF ABDOMEN SKIN, EXTERNAL APPROACH: ICD-10-PCS | Performed by: SURGERY

## 2023-06-20 PROCEDURE — 7100000001 HC PACU RECOVERY - ADDTL 15 MIN: Performed by: SURGERY

## 2023-06-20 PROCEDURE — 2580000003 HC RX 258: Performed by: ANESTHESIOLOGY

## 2023-06-20 PROCEDURE — 6360000002 HC RX W HCPCS: Performed by: ANESTHESIOLOGY

## 2023-06-20 PROCEDURE — 6370000000 HC RX 637 (ALT 250 FOR IP): Performed by: SURGERY

## 2023-06-20 PROCEDURE — 2580000003 HC RX 258: Performed by: SURGERY

## 2023-06-20 PROCEDURE — 2709999900 HC NON-CHARGEABLE SUPPLY: Performed by: SURGERY

## 2023-06-20 PROCEDURE — 3700000001 HC ADD 15 MINUTES (ANESTHESIA): Performed by: SURGERY

## 2023-06-20 PROCEDURE — 87075 CULTR BACTERIA EXCEPT BLOOD: CPT

## 2023-06-20 PROCEDURE — 85027 COMPLETE CBC AUTOMATED: CPT

## 2023-06-20 PROCEDURE — 3600000012 HC SURGERY LEVEL 2 ADDTL 15MIN: Performed by: SURGERY

## 2023-06-20 PROCEDURE — 87076 CULTURE ANAEROBE IDENT EACH: CPT

## 2023-06-20 PROCEDURE — 82947 ASSAY GLUCOSE BLOOD QUANT: CPT

## 2023-06-20 PROCEDURE — 3600000002 HC SURGERY LEVEL 2 BASE: Performed by: SURGERY

## 2023-06-20 RX ORDER — SODIUM CHLORIDE 0.9 % (FLUSH) 0.9 %
5-40 SYRINGE (ML) INJECTION PRN
Status: DISCONTINUED | OUTPATIENT
Start: 2023-06-20 | End: 2023-06-23 | Stop reason: HOSPADM

## 2023-06-20 RX ORDER — CEPHALEXIN 500 MG/1
CAPSULE ORAL
Qty: 21 CAPSULE | Refills: 0 | Status: SHIPPED | OUTPATIENT
Start: 2023-06-20

## 2023-06-20 RX ORDER — DEXTROSE AND SODIUM CHLORIDE 5; .9 G/100ML; G/100ML
INJECTION, SOLUTION INTRAVENOUS CONTINUOUS
Status: DISCONTINUED | OUTPATIENT
Start: 2023-06-20 | End: 2023-06-20

## 2023-06-20 RX ORDER — OXYCODONE HYDROCHLORIDE AND ACETAMINOPHEN 5; 325 MG/1; MG/1
1 TABLET ORAL EVERY 6 HOURS PRN
Qty: 28 TABLET | Refills: 0 | Status: SHIPPED | OUTPATIENT
Start: 2023-06-20 | End: 2023-06-27

## 2023-06-20 RX ORDER — FENTANYL CITRATE 0.05 MG/ML
25 INJECTION, SOLUTION INTRAMUSCULAR; INTRAVENOUS EVERY 5 MIN PRN
Status: DISCONTINUED | OUTPATIENT
Start: 2023-06-20 | End: 2023-06-20 | Stop reason: HOSPADM

## 2023-06-20 RX ORDER — METOCLOPRAMIDE HYDROCHLORIDE 5 MG/ML
10 INJECTION INTRAMUSCULAR; INTRAVENOUS
Status: ACTIVE | OUTPATIENT
Start: 2023-06-20 | End: 2023-06-21

## 2023-06-20 RX ORDER — SODIUM CHLORIDE 9 MG/ML
INJECTION, SOLUTION INTRAVENOUS CONTINUOUS PRN
Status: DISCONTINUED | OUTPATIENT
Start: 2023-06-20 | End: 2023-06-20 | Stop reason: SDUPTHER

## 2023-06-20 RX ORDER — ONDANSETRON 2 MG/ML
4 INJECTION INTRAMUSCULAR; INTRAVENOUS
Status: ACTIVE | OUTPATIENT
Start: 2023-06-20 | End: 2023-06-21

## 2023-06-20 RX ORDER — ONDANSETRON 4 MG/1
TABLET, FILM COATED ORAL
Qty: 20 TABLET | Refills: 0 | Status: SHIPPED | OUTPATIENT
Start: 2023-06-20

## 2023-06-20 RX ORDER — FENTANYL CITRATE 50 UG/ML
INJECTION, SOLUTION INTRAMUSCULAR; INTRAVENOUS PRN
Status: DISCONTINUED | OUTPATIENT
Start: 2023-06-20 | End: 2023-06-20 | Stop reason: SDUPTHER

## 2023-06-20 RX ORDER — DOXYCYCLINE HYCLATE 100 MG
100 TABLET ORAL 2 TIMES DAILY
Qty: 28 TABLET | Refills: 0 | Status: SHIPPED | OUTPATIENT
Start: 2023-06-20 | End: 2023-07-04

## 2023-06-20 RX ORDER — DEXAMETHASONE SODIUM PHOSPHATE 4 MG/ML
INJECTION, SOLUTION INTRA-ARTICULAR; INTRALESIONAL; INTRAMUSCULAR; INTRAVENOUS; SOFT TISSUE PRN
Status: DISCONTINUED | OUTPATIENT
Start: 2023-06-20 | End: 2023-06-20 | Stop reason: SDUPTHER

## 2023-06-20 RX ORDER — SODIUM CHLORIDE 9 MG/ML
INJECTION, SOLUTION INTRAVENOUS PRN
Status: DISCONTINUED | OUTPATIENT
Start: 2023-06-20 | End: 2023-06-23 | Stop reason: HOSPADM

## 2023-06-20 RX ORDER — ONDANSETRON 2 MG/ML
INJECTION INTRAMUSCULAR; INTRAVENOUS PRN
Status: DISCONTINUED | OUTPATIENT
Start: 2023-06-20 | End: 2023-06-20 | Stop reason: SDUPTHER

## 2023-06-20 RX ORDER — LIDOCAINE HYDROCHLORIDE 10 MG/ML
INJECTION, SOLUTION EPIDURAL; INFILTRATION; INTRACAUDAL; PERINEURAL PRN
Status: DISCONTINUED | OUTPATIENT
Start: 2023-06-20 | End: 2023-06-20 | Stop reason: SDUPTHER

## 2023-06-20 RX ORDER — DIPHENHYDRAMINE HYDROCHLORIDE 50 MG/ML
12.5 INJECTION INTRAMUSCULAR; INTRAVENOUS
Status: ACTIVE | OUTPATIENT
Start: 2023-06-20 | End: 2023-06-21

## 2023-06-20 RX ORDER — SODIUM CHLORIDE 9 MG/ML
INJECTION, SOLUTION INTRAVENOUS CONTINUOUS
Status: DISCONTINUED | OUTPATIENT
Start: 2023-06-20 | End: 2023-06-23 | Stop reason: HOSPADM

## 2023-06-20 RX ORDER — OXYCODONE HYDROCHLORIDE AND ACETAMINOPHEN 5; 325 MG/1; MG/1
1 TABLET ORAL EVERY 4 HOURS PRN
Status: DISCONTINUED | OUTPATIENT
Start: 2023-06-20 | End: 2023-06-23 | Stop reason: HOSPADM

## 2023-06-20 RX ORDER — FENTANYL CITRATE 50 UG/ML
100 INJECTION, SOLUTION INTRAMUSCULAR; INTRAVENOUS
Status: DISCONTINUED | OUTPATIENT
Start: 2023-06-20 | End: 2023-06-22

## 2023-06-20 RX ORDER — PROPOFOL 10 MG/ML
INJECTION, EMULSION INTRAVENOUS PRN
Status: DISCONTINUED | OUTPATIENT
Start: 2023-06-20 | End: 2023-06-20 | Stop reason: SDUPTHER

## 2023-06-20 RX ORDER — FENTANYL CITRATE 0.05 MG/ML
50 INJECTION, SOLUTION INTRAMUSCULAR; INTRAVENOUS
Status: DISCONTINUED | OUTPATIENT
Start: 2023-06-20 | End: 2023-06-22

## 2023-06-20 RX ORDER — SODIUM CHLORIDE 0.9 % (FLUSH) 0.9 %
5-40 SYRINGE (ML) INJECTION EVERY 12 HOURS SCHEDULED
Status: DISCONTINUED | OUTPATIENT
Start: 2023-06-20 | End: 2023-06-23 | Stop reason: HOSPADM

## 2023-06-20 RX ADMIN — CEFEPIME 2000 MG: 2 INJECTION, POWDER, FOR SOLUTION INTRAVENOUS at 18:41

## 2023-06-20 RX ADMIN — ONDANSETRON 4 MG: 2 INJECTION INTRAMUSCULAR; INTRAVENOUS at 17:33

## 2023-06-20 RX ADMIN — PROPOFOL 200 MG: 10 INJECTION, EMULSION INTRAVENOUS at 17:01

## 2023-06-20 RX ADMIN — SODIUM CHLORIDE: 9 INJECTION, SOLUTION INTRAVENOUS at 18:36

## 2023-06-20 RX ADMIN — CEFEPIME 2000 MG: 2 INJECTION, POWDER, FOR SOLUTION INTRAVENOUS at 11:12

## 2023-06-20 RX ADMIN — CEFEPIME 2000 MG: 2 INJECTION, POWDER, FOR SOLUTION INTRAVENOUS at 02:22

## 2023-06-20 RX ADMIN — DEXTROSE AND SODIUM CHLORIDE: 5; 900 INJECTION, SOLUTION INTRAVENOUS at 09:23

## 2023-06-20 RX ADMIN — SODIUM CHLORIDE: 9 INJECTION, SOLUTION INTRAVENOUS at 05:47

## 2023-06-20 RX ADMIN — FENTANYL CITRATE 50 MCG: 50 INJECTION, SOLUTION INTRAMUSCULAR; INTRAVENOUS at 17:15

## 2023-06-20 RX ADMIN — ATENOLOL 25 MG: 25 TABLET ORAL at 22:11

## 2023-06-20 RX ADMIN — SODIUM CHLORIDE: 9 INJECTION, SOLUTION INTRAVENOUS at 16:59

## 2023-06-20 RX ADMIN — FAMOTIDINE 20 MG: 10 INJECTION, SOLUTION INTRAVENOUS at 22:11

## 2023-06-20 RX ADMIN — OXYCODONE HYDROCHLORIDE AND ACETAMINOPHEN 1 TABLET: 5; 325 TABLET ORAL at 18:35

## 2023-06-20 RX ADMIN — FAMOTIDINE 20 MG: 10 INJECTION, SOLUTION INTRAVENOUS at 09:07

## 2023-06-20 RX ADMIN — DEXAMETHASONE SODIUM PHOSPHATE 4 MG: 4 INJECTION, SOLUTION INTRAMUSCULAR; INTRAVENOUS at 17:31

## 2023-06-20 RX ADMIN — VANCOMYCIN HYDROCHLORIDE 750 MG: 750 INJECTION, POWDER, LYOPHILIZED, FOR SOLUTION INTRAVENOUS at 20:05

## 2023-06-20 RX ADMIN — VANCOMYCIN HYDROCHLORIDE 750 MG: 750 INJECTION, POWDER, LYOPHILIZED, FOR SOLUTION INTRAVENOUS at 09:23

## 2023-06-20 RX ADMIN — FENTANYL CITRATE 50 MCG: 50 INJECTION, SOLUTION INTRAMUSCULAR; INTRAVENOUS at 17:21

## 2023-06-20 RX ADMIN — ACETAMINOPHEN 650 MG: 325 TABLET ORAL at 09:06

## 2023-06-20 RX ADMIN — LIDOCAINE HYDROCHLORIDE 50 MG: 10 INJECTION, SOLUTION EPIDURAL; INFILTRATION; INTRACAUDAL; PERINEURAL at 17:01

## 2023-06-20 ASSESSMENT — ENCOUNTER SYMPTOMS
ABDOMINAL PAIN: 1
ROS SKIN COMMENTS: SEE HPI.

## 2023-06-20 ASSESSMENT — PAIN DESCRIPTION - ORIENTATION
ORIENTATION: LEFT;UPPER
ORIENTATION: MID;UPPER

## 2023-06-20 ASSESSMENT — PAIN DESCRIPTION - ONSET: ONSET: GRADUAL

## 2023-06-20 ASSESSMENT — PAIN SCALES - GENERAL
PAINLEVEL_OUTOF10: 6

## 2023-06-20 ASSESSMENT — PAIN DESCRIPTION - LOCATION
LOCATION: ABDOMEN
LOCATION: ABDOMEN

## 2023-06-20 ASSESSMENT — PAIN DESCRIPTION - DESCRIPTORS: DESCRIPTORS: SORE

## 2023-06-20 ASSESSMENT — PAIN DESCRIPTION - PAIN TYPE: TYPE: SURGICAL PAIN

## 2023-06-20 ASSESSMENT — COPD QUESTIONNAIRES: CAT_SEVERITY: NO INTERVAL CHANGE

## 2023-06-20 ASSESSMENT — PAIN DESCRIPTION - FREQUENCY: FREQUENCY: CONTINUOUS

## 2023-06-20 NOTE — OP NOTE
Patient: Natalio Alvarez  YOB: 1955  MRN: [id number]    Date of Procedure: 6/20/2023        Preoperative diagnosis: Infected epidermal cyst left upper outer quadrant anterior abdominal wall    Postoperative diagnosis: Same    Procedure: Incision and drainage infected epidermal cyst left upper outer quadrant anterior abdominal wall    Surgeon: Dr. Meneses    Asst.: None    Anesthesia: General    Preparation: Hibiclens    EBL: Less than 10 mL    Specimen: Cultures obtained    Procedure: Informed consent was obtained. Site was marked and confirmed. Patient was taken to the operating room. General anesthesia was given. Abdomen was prepped and draped in usual sterile fashion. Timeout was done. Incision and drainage of an infected epidermal inclusion cyst on the left upper outer quadrant of the intra-abdominal wall was performed. Cyst contents were removed. Cultures were obtained. Wound was irrigated. Hemostasis was confirmed. Sponge needle instrument counts were to be correct. Wound was packed open using half-inch iodoform gauze. Clean dressing was applied. Patient tolerated procedure well and was transferred to the recovery room in a stable condition.  -  Recommendations: Resume diet. Pain control. IV antibiotics. Wound care nurse to see regarding wound VAC placement. Discharge planning.

## 2023-06-20 NOTE — ANESTHESIA POSTPROCEDURE EVALUATION
POST- ANESTHESIA EVALUATION       Pt Name: Jemma Godinez  MRN: [id number]  YOB: 1955  Date of evaluation: 6/20/2023  Time:  5:47 PM      /80   Pulse 76   Temp 97.5 °F (36.4 °C) (Infrared)   Resp 13   Ht 5' 6\" (1.676 m)   Wt 190 lb (86.2 kg)   LMP 06/26/1997   SpO2 96%   BMI 30.67 kg/m²      Consciousness Level  Awake  Cardiopulmonary Status  Stable  Pain Adequately Treated YES  Nausea / Vomiting  NO  Adequate Hydration  YES  Anesthesia Related Complications NONE      Electronically signed by Raquel Weber MD on 6/20/2023 at 5:47 PM       Department of Anesthesiology  Postprocedure Note    Patient: Jemma Godinez  MRN: [id number]  YOB: 1955  Date of evaluation: 6/20/2023      Procedure Summary     Date: 06/20/23 Room / Location: [address] [place] [address]   / [facility]: [facility]    Anesthesia Start: 4564 Anesthesia Stop: 4176    Procedure: ABDOMEN UPPER QUADRANT CYST INCISION AND DRAINAGE (Left: Abdomen) Diagnosis:       Sebaceous cyst      (Sebaceous cyst [L72.3])      (IP RM 8813)    Surgeons: Ester Gibson MD Responsible Provider: Raquel Weber MD    Anesthesia Type: general ASA Status: 3          Anesthesia Type: No value filed.     Nicholas Phase I: Nicholas Score: 10    Nicholas Phase II:        Anesthesia Post Evaluation

## 2023-06-20 NOTE — ANESTHESIA PRE PROCEDURE
Department of Anesthesiology  Preprocedure Note       Name:  Noemí Smith   Age:  69 y.o.  :  1955                                          MRN:  [id number]         Date:  2023      Surgeon: Segun Ulloa):  Kaushal Weber MD    Procedure: Procedure(s):  ABDOMEN UPPER QUADRANT CYST INCISION AND DRAINAGE    Medications prior to admission:   Prior to Admission medications    Medication Sig Start Date End Date Taking?  Authorizing Provider   Cholecalciferol (VITAMIN D3) 50 MCG (2000) CAPS Take 1 capsule by mouth daily   Yes Historical Provider, MD   atenolol (TENORMIN) 25 MG tablet Take 1 tablet by mouth daily   Yes Historical Provider, MD   cephALEXin (KEFLEX) 500 MG capsule Take 1 capsule by mouth 3 times daily for 20 days  Patient taking differently: Take 1 capsule by mouth 3 times daily Indications: started 23  Ronni Perry MD   ibuprofen (ADVIL;MOTRIN) 800 MG tablet Take 1 tablet by mouth daily as needed for Pain 23   EVITA Nobles   pantoprazole (PROTONIX) 20 MG tablet Take 1 tablet by mouth daily 23  EVITA Nobles   lisinopril-hydroCHLOROthiazide (PRINZIDE;ZESTORETIC) 20-12.5 MG per tablet TAKE 1 TABLET EVERY DAY 23   EVITA Dubose   Probiotic Product (PROBIOTIC DAILY PO) Take by mouth daily    Historical Provider, MD   Multiple Vitamins-Minerals (THERAPEUTIC MULTIVITAMIN-MINERALS) tablet Take 1 tablet by mouth daily    Historical Provider, MD       Current medications:    Current Facility-Administered Medications   Medication Dose Route Frequency Provider Last Rate Last Admin    dextrose 5 % and 0.9 % sodium chloride infusion   IntraVENous Continuous Ronni Perry  mL/hr at 23 New Bag at 23    sodium chloride flush 0.9 % injection 10 mL  10 mL IntraVENous PRN Romina Burnett PA-C   10 mL at 23    famotidine (PEPCID) injection 20 mg  20 mg IntraVENous BID

## 2023-06-20 NOTE — DISCHARGE INSTR - COC
Continuity of Care Form    Patient Name: Merlin Comas   :  1955  MRN:  [id number]    Admit date:  2023  Discharge date:  2023      Code Status Order: Prior   Advance Directives:     Admitting Physician:  Cony Monroy MD  PCP: Cony Monroy MD    Discharging Nurse: [facility] Unit/Room#: [address]  Discharging Unit Phone Number: [phone]    Emergency Contact:   Extended Emergency Contact Information  Primary Emergency Contact: Luis F Deluca  Address: [address] [place] [address] Gertrude Momin Alcides [employer]  [address] Phone: [phone]  Relation: Spouse  Hearing or visual needs: None  Other needs: None  Preferred language: English   needed? No  Secondary Emergency Contact: Rebecca Coulter  Address: [address]  [facility] Gertrude Naomi Momin Alcides [employer]  [address] Phone: [phone]  Work Phone: [phone]  Mobile Phone: [phone]  Relation: Parent  Hearing or visual needs: None  Other needs: None  Preferred language: English   needed? No    Past Surgical History:  Past Surgical History:   Procedure Laterality Date    CHOLECYSTECTOMY, LAPAROSCOPIC      COLONOSCOPY  2009    small hemorrhoids    COLONOSCOPY  2002    hemohhoids, hyperplastic polyp, right colon pathology-chronic inflammation in lumina propria with focal actue cryptitis consistent with active colitis    COLONOSCOPY  2016    flat sigmoid [id number]    COLONOSCOPY  2017    NO RECURRENT POLYPS SEEN AT THE PREVIOUS POLYPECTOMY SITE.  hOWEVER IN THE SIGMOID COLON AT ABOUT 50 CM FROM THE ANUS FLAT POLYP SEEN. , PATHOLOGY-- LARGE INTESTINAL TYPE MUCOSA WITH MIXED FEATURES OF     COLONOSCOPY N/A 2022    COLONOSCOPY POLYPECTOMY SNARE/COLD BIOPSY performed by Melissa Greer MD at [address]  30's    KNEE ARTHROSCOPY Left 10/6/2020

## 2023-06-20 NOTE — ACP (ADVANCE CARE PLANNING)
Advance Care Planning     Advance Care Planning Activator (Inpatient)  Conversation Note      Date of ACP Conversation: 6/20/2023     Conversation Conducted with: Patient with Decision Making Capacity    ACP Activator: Maryann Flores RN      Health Care Decision Maker:     Current Designated Health Care Decision Maker:     Primary Decision Maker: Valeri Love Spouse - [phone]        Care Preferences    Ventilation: \"If you were in your present state of health and suddenly became very ill and were unable to breathe on your own, what would your preference be about the use of a ventilator (breathing machine) if it were available to you? \"      Would the patient desire the use of ventilator (breathing machine)?: yes    \"If your health worsens and it becomes clear that your chance of recovery is unlikely, what would your preference be about the use of a ventilator (breathing machine) if it were available to you? \"     Would the patient desire the use of ventilator (breathing machine)?: No      Resuscitation  \"CPR works best to restart the heart when there is a sudden event, like a heart attack, in someone who is otherwise healthy. Unfortunately, CPR does not typically restart the heart for people who have serious health conditions or who are very sick. \"    \"In the event your heart stopped as a result of an underlying serious health condition, would you want attempts to be made to restart your heart (answer \"yes\" for attempt to resuscitate) or would you prefer a natural death (answer \"no\" for do not attempt to resuscitate)? \" yes       [] Yes   [] No   Educated Patient / Mora Thomas regarding differences between Advance Directives and portable DNR orders.     Length of ACP Conversation in minutes:      Conversation Outcomes:  ACP discussion completed    Follow-up plan:    [] Schedule follow-up conversation to continue planning  [] Referred individual to Provider for additional questions/concerns   [] Advised

## 2023-06-21 LAB
ANION GAP SERPL CALCULATED.3IONS-SCNC: 10 MMOL/L (ref 9–17)
BASOPHILS # BLD: 0 K/UL (ref 0–0.2)
BASOPHILS NFR BLD: 1 % (ref 0–2)
BUN SERPL-MCNC: 16 MG/DL (ref 8–23)
CALCIUM SERPL-MCNC: 8.1 MG/DL (ref 8.6–10.4)
CHLORIDE SERPL-SCNC: 107 MMOL/L (ref 98–107)
CO2 SERPL-SCNC: 21 MMOL/L (ref 20–31)
CREAT SERPL-MCNC: 0.76 MG/DL (ref 0.5–0.9)
DATE LAST DOSE: NORMAL
EOSINOPHIL # BLD: 0 K/UL (ref 0–0.4)
EOSINOPHILS RELATIVE PERCENT: 0 % (ref 0–4)
ERYTHROCYTE [DISTWIDTH] IN BLOOD BY AUTOMATED COUNT: 15.2 % (ref 11.5–14.9)
GFR SERPL CREATININE-BSD FRML MDRD: >60 ML/MIN/1.73M2
GLUCOSE SERPL-MCNC: 149 MG/DL (ref 70–99)
HCT VFR BLD AUTO: 34.5 % (ref 36–46)
HGB BLD-MCNC: 11.4 G/DL (ref 12–16)
LYMPHOCYTES # BLD: 13 % (ref 24–44)
LYMPHOCYTES NFR BLD: 0.7 K/UL (ref 1–4.8)
MCH RBC QN AUTO: 30 PG (ref 26–34)
MCHC RBC AUTO-ENTMCNC: 33.2 G/DL (ref 31–37)
MCV RBC AUTO: 90.3 FL (ref 80–100)
MONOCYTES NFR BLD: 0.3 K/UL (ref 0.1–1.3)
MONOCYTES NFR BLD: 5 % (ref 1–7)
NEUTROPHILS NFR BLD: 81 % (ref 36–66)
NEUTS SEG NFR BLD: 4 K/UL (ref 1.3–9.1)
PLATELET # BLD AUTO: 210 K/UL (ref 150–450)
PMV BLD AUTO: 9.1 FL (ref 6–12)
POTASSIUM SERPL-SCNC: 4.4 MMOL/L (ref 3.7–5.3)
RBC # BLD AUTO: 3.82 M/UL (ref 4–5.2)
SODIUM SERPL-SCNC: 138 MMOL/L (ref 135–144)
TME LAST DOSE: 2000 H
VANCOMYCIN DOSE: NORMAL MG
VANCOMYCIN SERPL-MCNC: 13.5 UG/ML
WBC OTHER # BLD: 4.9 K/UL (ref 3.5–11)

## 2023-06-21 PROCEDURE — 6370000000 HC RX 637 (ALT 250 FOR IP): Performed by: SURGERY

## 2023-06-21 PROCEDURE — 85027 COMPLETE CBC AUTOMATED: CPT

## 2023-06-21 PROCEDURE — 2580000003 HC RX 258: Performed by: FAMILY MEDICINE

## 2023-06-21 PROCEDURE — 80202 ASSAY OF VANCOMYCIN: CPT

## 2023-06-21 PROCEDURE — 36415 COLL VENOUS BLD VENIPUNCTURE: CPT

## 2023-06-21 PROCEDURE — 97605 NEG PRS WND THER DME<=50SQCM: CPT

## 2023-06-21 PROCEDURE — 2580000003 HC RX 258: Performed by: SURGERY

## 2023-06-21 PROCEDURE — 2500000003 HC RX 250 WO HCPCS: Performed by: SURGERY

## 2023-06-21 PROCEDURE — 87641 MR-STAPH DNA AMP PROBE: CPT

## 2023-06-21 PROCEDURE — 2580000003 HC RX 258: Performed by: ANESTHESIOLOGY

## 2023-06-21 PROCEDURE — 80048 BASIC METABOLIC PNL TOTAL CA: CPT

## 2023-06-21 PROCEDURE — 6360000002 HC RX W HCPCS: Performed by: SURGERY

## 2023-06-21 PROCEDURE — 1200000000 HC SEMI PRIVATE

## 2023-06-21 RX ADMIN — CEFEPIME 2000 MG: 2 INJECTION, POWDER, FOR SOLUTION INTRAVENOUS at 02:03

## 2023-06-21 RX ADMIN — SODIUM CHLORIDE: 9 INJECTION, SOLUTION INTRAVENOUS at 07:13

## 2023-06-21 RX ADMIN — ATENOLOL 25 MG: 25 TABLET ORAL at 21:55

## 2023-06-21 RX ADMIN — OXYCODONE HYDROCHLORIDE AND ACETAMINOPHEN 1 TABLET: 5; 325 TABLET ORAL at 09:59

## 2023-06-21 RX ADMIN — HYDROCHLOROTHIAZIDE 12.5 MG: 25 TABLET ORAL at 08:45

## 2023-06-21 RX ADMIN — VANCOMYCIN HYDROCHLORIDE 1250 MG: 1.25 INJECTION, POWDER, LYOPHILIZED, FOR SOLUTION INTRAVENOUS at 08:44

## 2023-06-21 RX ADMIN — CEFEPIME 2000 MG: 2 INJECTION, POWDER, FOR SOLUTION INTRAVENOUS at 17:42

## 2023-06-21 RX ADMIN — FAMOTIDINE 20 MG: 10 INJECTION, SOLUTION INTRAVENOUS at 21:54

## 2023-06-21 RX ADMIN — CEFEPIME 2000 MG: 2 INJECTION, POWDER, FOR SOLUTION INTRAVENOUS at 10:55

## 2023-06-21 RX ADMIN — FAMOTIDINE 20 MG: 10 INJECTION, SOLUTION INTRAVENOUS at 08:46

## 2023-06-21 RX ADMIN — LISINOPRIL 20 MG: 20 TABLET ORAL at 08:45

## 2023-06-21 RX ADMIN — OXYCODONE HYDROCHLORIDE AND ACETAMINOPHEN 1 TABLET: 5; 325 TABLET ORAL at 18:22

## 2023-06-21 RX ADMIN — SODIUM CHLORIDE, PRESERVATIVE FREE 10 ML: 5 INJECTION INTRAVENOUS at 21:52

## 2023-06-21 RX ADMIN — SODIUM CHLORIDE, PRESERVATIVE FREE 10 ML: 5 INJECTION INTRAVENOUS at 08:45

## 2023-06-21 RX ADMIN — SODIUM CHLORIDE: 9 INJECTION, SOLUTION INTRAVENOUS at 17:40

## 2023-06-21 ASSESSMENT — PAIN DESCRIPTION - ORIENTATION
ORIENTATION: LEFT;UPPER

## 2023-06-21 ASSESSMENT — PAIN DESCRIPTION - DESCRIPTORS
DESCRIPTORS: SORE
DESCRIPTORS: ACHING;SORE
DESCRIPTORS: SORE
DESCRIPTORS: ACHING;SORE
DESCRIPTORS: ACHING;SORE

## 2023-06-21 ASSESSMENT — PAIN SCALES - GENERAL
PAINLEVEL_OUTOF10: 0
PAINLEVEL_OUTOF10: 3
PAINLEVEL_OUTOF10: 3
PAINLEVEL_OUTOF10: 6
PAINLEVEL_OUTOF10: 2
PAINLEVEL_OUTOF10: 1
PAINLEVEL_OUTOF10: 1
PAINLEVEL_OUTOF10: 3
PAINLEVEL_OUTOF10: 0
PAINLEVEL_OUTOF10: 5

## 2023-06-21 ASSESSMENT — PAIN DESCRIPTION - LOCATION
LOCATION: ABDOMEN

## 2023-06-21 ASSESSMENT — PAIN DESCRIPTION - FREQUENCY
FREQUENCY: INTERMITTENT

## 2023-06-21 ASSESSMENT — PAIN DESCRIPTION - PAIN TYPE
TYPE: SURGICAL PAIN

## 2023-06-21 ASSESSMENT — PAIN DESCRIPTION - ONSET
ONSET: GRADUAL

## 2023-06-21 NOTE — H&P
Hospital Medicine  History and Physical                                                                                                                                                 Prior    Patient:  Mignon Cruz  MRN: [id number]                                                                                                                                                                     CHIEF COMPLAINT:  abscess ant abdominal wall    History Obtained From:  patient  PCP: Darrion Antunez MD    HISTORY OF PRESENT ILLNESS:   The patient is a 69 y.o. female who presents with h/o of abscess and pt was placed on oral antibiotics, pt failed out pt rx, pt was seen in ER, and admitted for evaluation and possible ID. Past Medical History:        Diagnosis Date    Arthritis     Bleb, lung ([address]     causes  pneumothorax     Colon polyps 11/18/2017    COPD (chronic obstructive pulmonary disease) (HCC)     COVID-19     GERD (gastroesophageal reflux disease)     Heart palpitations     History of colon polyps 2002    Hyperlipidemia     Hyperplastic colon polyp 2016    Hypertension     Kidney infection 2004    Pneumothorax     spontaneous x 2, chest tube    PONV (postoperative nausea and vomiting)     Supraventricular tachycardia ([address]     first episode about 20 years ago, well controlled now with Atenolol       Past Surgical History:        Procedure Laterality Date    CHOLECYSTECTOMY, LAPAROSCOPIC  2010    COLONOSCOPY  9/2009    small hemorrhoids    COLONOSCOPY  7/2002    hemohhoids, hyperplastic polyp, right colon pathology-chronic inflammation in lumina propria with focal actue cryptitis consistent with active colitis    COLONOSCOPY  02/17/2016    flat sigmoid [id number]    COLONOSCOPY  11/18/2017    NO RECURRENT POLYPS SEEN AT THE PREVIOUS POLYPECTOMY SITE.  hOWEVER IN THE SIGMOID COLON AT ABOUT 50 CM FROM THE ANUS FLAT POLYP SEEN. , PATHOLOGY-- LARGE INTESTINAL TYPE MUCOSA WITH MIXED

## 2023-06-21 NOTE — CONSULTS
[facility]  Consult Note       NAME:  Anshu Avina  MEDICAL RECORD NUMBER:  [id number]  AGE: 69 y.o.    GENDER: female  : 1955  TODAY'S DATE:  2023    Subjective:      Anshu Avina is a 69 y.o. female with inpatient referral to [facility] for:  Left abdominal wound      Wound Identification:  Wound Type:  surgical  Contributing Factors: smoking    Wound History: pt states that she has had a cyst on her abdomen for many years but noticed about  a week ago that it was getting red and more painful, I&D by Dr Bright Bolton on   Current Wound Care Treatment:  iodoform packing    Patient Goal of Care:  [x] Wound Healing  [] Odor Control  [] Palliative Care  [] Pain Control   [] Other:         PAST MEDICAL HISTORY        Diagnosis Date    Arthritis     Bleb, lung ([address]     causes  pneumothorax     Colon polyps 2017    COPD (chronic obstructive pulmonary disease) ([address]     COVID-19     GERD (gastroesophageal reflux disease)     Heart palpitations     History of colon polyps     Hyperlipidemia     Hyperplastic colon polyp     Hypertension     Kidney infection 2004    Pneumothorax     spontaneous x 2, chest tube    PONV (postoperative nausea and vomiting)     Supraventricular tachycardia ([address]     first episode about 20 years ago, well controlled now with Atenolol       PAST SURGICAL HISTORY    Past Surgical History:   Procedure Laterality Date    ABDOMEN SURGERY Left 2023    Incision and drainage infected epidermal cyst left upper outer quadrant anterior abdominal wall performed by Surekha Ruggiero MD at [address], LAPAROSCOPIC      COLONOSCOPY  2009    small hemorrhoids    COLONOSCOPY  2002    hemohhoids, hyperplastic polyp, right colon pathology-chronic inflammation in lumina propria with focal actue cryptitis consistent with active colitis    COLONOSCOPY  2016    flat sigmoid [id number]

## 2023-06-21 NOTE — PLAN OF CARE
Problem: Discharge Planning  Goal: Discharge to home or other facility with appropriate resources  Outcome: Progressing     Problem: Safety - Adult  Goal: Free from fall injury  Outcome: Progressing  Flowsheets (Taken 6/21/2023 1307)  Free From Fall Injury: Instruct family/caregiver on patient safety     Problem: ABCDS Injury Assessment  Goal: Absence of physical injury  Outcome: Progressing  Flowsheets (Taken 6/21/2023 1307)  Absence of Physical Injury: Implement safety measures based on patient assessment     Problem: Pain  Goal: Verbalizes/displays adequate comfort level or baseline comfort level  Outcome: Progressing     Problem: Cognitive:  Goal: Knowledge of wound care  Description: Knowledge of wound care  Outcome: Progressing  Goal: Understands risk factors for wounds  Description: Understands risk factors for wounds  Outcome: Progressing

## 2023-06-22 VITALS
DIASTOLIC BLOOD PRESSURE: 66 MMHG | SYSTOLIC BLOOD PRESSURE: 125 MMHG | WEIGHT: 190 LBS | HEIGHT: 66 IN | OXYGEN SATURATION: 96 % | TEMPERATURE: 98.2 F | HEART RATE: 67 BPM | RESPIRATION RATE: 18 BRPM | BODY MASS INDEX: 30.53 KG/M2

## 2023-06-22 LAB
MRSA, DNA, NASAL: NEGATIVE
SPECIMEN DESCRIPTION: NORMAL

## 2023-06-22 PROCEDURE — 2580000003 HC RX 258: Performed by: SURGERY

## 2023-06-22 PROCEDURE — 2580000003 HC RX 258: Performed by: FAMILY MEDICINE

## 2023-06-22 PROCEDURE — 6370000000 HC RX 637 (ALT 250 FOR IP): Performed by: SURGERY

## 2023-06-22 PROCEDURE — 6360000002 HC RX W HCPCS: Performed by: SURGERY

## 2023-06-22 PROCEDURE — 2500000003 HC RX 250 WO HCPCS: Performed by: SURGERY

## 2023-06-22 PROCEDURE — 2580000003 HC RX 258: Performed by: ANESTHESIOLOGY

## 2023-06-22 RX ORDER — FENTANYL CITRATE 50 UG/ML
100 INJECTION, SOLUTION INTRAMUSCULAR; INTRAVENOUS
Status: DISCONTINUED | OUTPATIENT
Start: 2023-06-22 | End: 2023-06-23 | Stop reason: HOSPADM

## 2023-06-22 RX ORDER — FENTANYL CITRATE 0.05 MG/ML
50 INJECTION, SOLUTION INTRAMUSCULAR; INTRAVENOUS
Status: DISCONTINUED | OUTPATIENT
Start: 2023-06-22 | End: 2023-06-23 | Stop reason: HOSPADM

## 2023-06-22 RX ORDER — FAMOTIDINE 20 MG/1
20 TABLET, FILM COATED ORAL 2 TIMES DAILY
Status: DISCONTINUED | OUTPATIENT
Start: 2023-06-22 | End: 2023-06-23 | Stop reason: HOSPADM

## 2023-06-22 RX ADMIN — HYDROCHLOROTHIAZIDE 12.5 MG: 25 TABLET ORAL at 08:52

## 2023-06-22 RX ADMIN — CEFEPIME 2000 MG: 2 INJECTION, POWDER, FOR SOLUTION INTRAVENOUS at 16:38

## 2023-06-22 RX ADMIN — FAMOTIDINE 20 MG: 10 INJECTION, SOLUTION INTRAVENOUS at 10:05

## 2023-06-22 RX ADMIN — OXYCODONE HYDROCHLORIDE AND ACETAMINOPHEN 1 TABLET: 5; 325 TABLET ORAL at 08:51

## 2023-06-22 RX ADMIN — CEFEPIME 2000 MG: 2 INJECTION, POWDER, FOR SOLUTION INTRAVENOUS at 04:49

## 2023-06-22 RX ADMIN — VANCOMYCIN HYDROCHLORIDE 1250 MG: 1.25 INJECTION, POWDER, LYOPHILIZED, FOR SOLUTION INTRAVENOUS at 02:32

## 2023-06-22 RX ADMIN — SODIUM CHLORIDE: 9 INJECTION, SOLUTION INTRAVENOUS at 04:47

## 2023-06-22 RX ADMIN — LISINOPRIL 20 MG: 20 TABLET ORAL at 08:52

## 2023-06-22 RX ADMIN — SODIUM CHLORIDE, PRESERVATIVE FREE 10 ML: 5 INJECTION INTRAVENOUS at 10:06

## 2023-06-22 ASSESSMENT — ENCOUNTER SYMPTOMS
VOMITING: 0
NAUSEA: 0
ABDOMINAL PAIN: 1
ROS SKIN COMMENTS: SEE HPI.
DIARRHEA: 0
CONSTIPATION: 0

## 2023-06-22 ASSESSMENT — PAIN DESCRIPTION - LOCATION: LOCATION: OTHER (COMMENT)

## 2023-06-22 ASSESSMENT — PAIN SCALES - GENERAL: PAINLEVEL_OUTOF10: 6

## 2023-06-22 ASSESSMENT — PAIN DESCRIPTION - DESCRIPTORS: DESCRIPTORS: ACHING

## 2023-06-22 ASSESSMENT — PAIN DESCRIPTION - ORIENTATION: ORIENTATION: LEFT

## 2023-06-22 NOTE — DISCHARGE SUMMARY
Hospitalist Discharge Summary    Seng Roberts  :  1955  MRN:  [id number]    Admit date:  2023  Discharge date:  23    Admitting Physician:  Radha Payne MD    Discharge Diagnoses:   Patient Active Problem List   Diagnosis    Hyperlipidemia    Heart palpitations    History of colon polyps    Change in bowel function    Rectal bleeding    Hyperplastic colon polyp    Diarrhea    Colon polyps    Primary osteoarthritis of left knee    Subarachnoid bleed ([address]    Syncope    Abscess        Admission Condition:  fair      Discharged Condition:  good    Hospital Course/Treatments   admitted with with h/o of abscess, pt was admitted and  had surgical consult and was taken to OR and had I&D, pt had wound vac and pt did well, pt d/c home with wound vac    Discharge Medications:         Medication List        START taking these medications      doxycycline hyclate 100 MG tablet  Commonly known as: VIBRA-TABS  Take 1 tablet by mouth 2 times daily for 14 days     ondansetron 4 MG tablet  Commonly known as: Zofran  Take every six hours as needed     oxyCODONE-acetaminophen 5-325 MG per tablet  Commonly known as: Percocet  Take 1 tablet by mouth every 6 hours as needed for Pain for up to 7 days.  . Take lowest dose possible to manage pain Max Daily Amount: 4 tablets            CHANGE how you take these medications      cephALEXin 500 MG capsule  Commonly known as: KEFLEX  500 mgTake three times daily  What changed:   how much to take  how to take this  when to take this  additional instructions            CONTINUE taking these medications      atenolol 25 MG tablet  Commonly known as: TENORMIN     ibuprofen 800 MG tablet  Commonly known as: ADVIL;MOTRIN  Take 1 tablet by mouth daily as needed for Pain     lisinopril-hydroCHLOROthiazide 20-12.5 MG per tablet  Commonly known as: PRINZIDE;ZESTORETIC  TAKE 1 TABLET EVERY DAY     pantoprazole 20 MG tablet  Commonly known as: PROTONIX  Take 1 tablet by mouth

## 2023-06-22 NOTE — DISCHARGE INSTR - DIET

## 2023-06-23 NOTE — CARE COORDINATION
Case Management Assessment  Initial Evaluation    Date/Time of Evaluation: 6/20/2023 9:39 AM  Assessment Completed by: Donna Campbell RN    If patient is discharged prior to next notation, then this note serves as note for discharge by case management. Patient Name: Kris Rousseau                   YOB: 1955  Diagnosis: Abscess [L02.91]                   Date / Time: 6/19/2023  1:39 PM    Patient Admission Status: Inpatient   Readmission Risk (Low < 19, Mod (19-27), High > 27): Readmission Risk Score: 9.1    Current PCP: Nolvia Sanchez MD  PCP verified by CM? Yes    Chart Reviewed: Yes      History Provided by: Patient  Patient Orientation: Alert and Oriented    Patient Cognition: Alert    Hospitalization in the last 30 days (Readmission):  No    If yes, Readmission Assessment in CM Navigator will be completed. Advance Directives:      Code Status: Prior   Patient's Primary Decision Maker is:        Discharge Planning:    Patient lives with: Spouse/Significant Other Type of Home: House  Primary Care Giver: Self  Patient Support Systems include: Spouse/Significant Other, Children   Current Financial resources: Medicare  Current community resources: None  Current services prior to admission: Durable Medical Equipment            Current DME: Ivonne Dhillon (States [facility] has all needed equipment\")            Type of Home Care services:  None    ADLS  Prior functional level: Independent in ADLs/IADLs  Current functional level: Independent in ADLs/IADLs    PT AM-PAC:   /24  OT AM-PAC:   /24    Family can provide assistance at DC: Yes  Would you like Case Management to discuss the discharge plan with any other family members/significant others, and if so, who?  No  Plans to Return to Present Housing: Yes  Other Identified Issues/Barriers to RETURNING to current housing: None  Potential Assistance needed at discharge: N/A            Potential DME:    Patient expects to discharge to: [address] [place] [address] for
ONGOING DISCHARGE PLAN:    Patient is alert and oriented x4. Spoke with patient regarding discharge plan and patient confirms that plan is still to discharge to home with [facility]    Script for wound vac send to [facility]    Possible discharge to home today if pump can be delivered to bedside    Will continue to follow for additional discharge needs. If patient is discharged prior to next notation, then this note serves as note for discharge by case management.     Electronically signed by Harley Sykes RN on 6/22/2023 at 1:53 PM
ONGOING DISCHARGE PLAN:    Patient is alert and oriented x4. Spoke with patient regarding discharge plan and patient confirms that plan is still to return to home w/ . Pt. Is POD #1, I & D of abdominal cyst. Will need Wound Vac. Cr Hays, from [facility], [address] faxed [employer], Op Notes, Face Sheet to her & informed her that Rica Mauricio is waiting for Wound Care Nurse to see & Script to be signed by the Doctor. Script is on the front of Pt's Chart, She will follow. Pt. Would like [facility], has had them in the past, would like the same Nurse. Gloria notified. Remains on IV Cefepime/Vanco.     Denies other needs. Will continue to follow for additional discharge needs. If patient is discharged prior to next notation, then this note serves as note for discharge by case management.     Electronically signed by Aaron Jean RN on 6/21/2023 at 9:16 AM
[address] 46, Gertrude Momin Alcides Aliya 1999 [facility] of [facility] Phone: [phone]  Work Phone: [phone]  Mobile Phone: [phone]  Relation: Parent  Hearing or visual needs: None  Other needs: None  Preferred language: English   needed? No     Past Surgical History:        Past Surgical History:   Procedure Laterality Date    CHOLECYSTECTOMY, LAPAROSCOPIC   2010    COLONOSCOPY   9/2009     small hemorrhoids    COLONOSCOPY   7/2002     hemohhoids, hyperplastic polyp, right colon pathology-chronic inflammation in lumina propria with focal actue cryptitis consistent with active colitis    COLONOSCOPY   02/17/2016     flat sigmoid tnute-zucbjjiwj-eprcrrmwezyz    COLONOSCOPY   11/18/2017     NO RECURRENT POLYPS SEEN AT THE PREVIOUS POLYPECTOMY SITE.  hOWEVER IN THE SIGMOID COLON AT ABOUT 50 CM FROM THE ANUS FLAT POLYP SEEN. , PATHOLOGY-- LARGE INTESTINAL TYPE MUCOSA WITH MIXED FEATURES OF     COLONOSCOPY N/A 5/2/2022     COLONOSCOPY POLYPECTOMY SNARE/COLD BIOPSY performed by Dakota Carranza MD at [place] [address]'s    KNEE ARTHROSCOPY Left 10/6/2020     KNEE ARTHROSCOPY PARTIAL MEDIAL MENISCECTOMY performed by Frances Jay MD at [place] [address] FLX W/REMOVAL LESION BY  [address] N/A 11/18/2017     COLONOSCOPY POLYPECTOMY HOT BIOPSY, COLD BIOPSY, APC SIGMOID, SPOT MARKING SIGMOID performed by Dakota Carranza MD at [facility]   2003     hemorrhoids, no colitis seen    TONSILLECTOMY AND ADENOIDECTOMY         child     TOTAL KNEE ARTHROPLASTY Left 8/12/2021     KNEE TOTAL ARTHROPLASTY performed by Frances Jay MD at [facility] N/A 5/2/2022     EGD BIOPSY COLD SNARE performed by Dakota Carranza MD at [facility]         Immunization History:        Immunization History   Administered Date(s) Administered    COVID-19, PFIZER PURPLE top, DILUTE for use,

## 2023-06-23 NOTE — PROGRESS NOTES
06/22/23 1556   Encounter Summary   Encounter Overview/Reason  Volunteer Encounter   Service Provided For: Patient   Referral/Consult From: Rounding   Last Encounter  06/22/23  (V)   Complexity of Encounter Low   Spiritual/Emotional needs   Type Spiritual Support   Rituals, Rites and [address]
General Surgery Progress Note                PATIENT NAME: Lizzy Hardy     TODAY'S DATE: 6/20/2023, 5:25 PM    SUBJECTIVE: Patient seen and examined at bedside, plan to go to OR today for incision and drainage of cyst. States pain is controlled, does state that there has been drainage from wound. Patient presented to the ED with cyst to LUQ. States she has a hx of boils in the past. She states this cyst has been present for about 2 years, but recently worsened over the past 2 weeks. States the area was erythematous, tender, draining white pus, as well as blood. Per chart, patient has significant medical hx of COPD, pneumothorax, SVT- see chart for additional detail. Patient denies history of diabetes. PAST MEDICAL HISTORY     Past Medical History:   Diagnosis Date    Arthritis     Bleb, lung ([address]     causes  pneumothorax     Colon polyps 11/18/2017    COPD (chronic obstructive pulmonary disease) (HCC)     COVID-19     GERD (gastroesophageal reflux disease)     Heart palpitations     History of colon polyps 2002    Hyperlipidemia     Hyperplastic colon polyp 2016    Hypertension     Kidney infection 2004    Pneumothorax     spontaneous x 2, chest tube    PONV (postoperative nausea and vomiting)     Supraventricular tachycardia ([address]     first episode about 20 years ago, well controlled now with Atenolol       SURGICAL HISTORY       Past Surgical History:   Procedure Laterality Date    CHOLECYSTECTOMY, LAPAROSCOPIC  2010    COLONOSCOPY  9/2009    small hemorrhoids    COLONOSCOPY  7/2002    hemohhoids, hyperplastic polyp, right colon pathology-chronic inflammation in lumina propria with focal actue cryptitis consistent with active colitis    COLONOSCOPY  02/17/2016    flat sigmoid fhmzi-yocceevjy-cgxiwkmpqdwe    COLONOSCOPY  11/18/2017    NO RECURRENT POLYPS SEEN AT THE PREVIOUS POLYPECTOMY SITE.  hOWEVER IN THE SIGMOID COLON AT ABOUT 50 CM FROM THE ANUS FLAT POLYP SEEN. , PATHOLOGY-- LARGE INTESTINAL TYPE
General Surgery Progress Note             POD # 2    PATIENT NAME: Carl Pham     TODAY'S DATE: 6/22/2023, 9:05 AM    SUBJECTIVE: Patient is postop day 2 incision drainage of infected epidermal cyst to left upper quadrant of abdominal wall. Patient states that pain overall is controlled, rating pain at about a 5 today. She is moving her bowels normally. Denies any nausea vomiting, denies fever or chills. Wound VAC is in place. PAST MEDICAL HISTORY     Past Medical History:   Diagnosis Date    Arthritis     Bleb, lung ([address]     causes  pneumothorax     Colon polyps 11/18/2017    COPD (chronic obstructive pulmonary disease) (HCC)     COVID-19     GERD (gastroesophageal reflux disease)     Heart palpitations     History of colon polyps 2002    Hyperlipidemia     Hyperplastic colon polyp 2016    Hypertension     Kidney infection 2004    Pneumothorax     spontaneous x 2, chest tube    PONV (postoperative nausea and vomiting)     Supraventricular tachycardia ([address]     first episode about 20 years ago, well controlled now with Atenolol       SURGICAL HISTORY       Past Surgical History:   Procedure Laterality Date    ABDOMEN SURGERY Left 6/20/2023    Incision and drainage infected epidermal cyst left upper outer quadrant anterior abdominal wall performed by Rebel Ornelas MD at [address], LAPAROSCOPIC  2010    COLONOSCOPY  9/2009    small hemorrhoids    COLONOSCOPY  7/2002    hemohhoids, hyperplastic polyp, right colon pathology-chronic inflammation in lumina propria with focal actue cryptitis consistent with active colitis    COLONOSCOPY  02/17/2016    flat sigmoid [id number]    COLONOSCOPY  11/18/2017    NO RECURRENT POLYPS SEEN AT THE PREVIOUS POLYPECTOMY SITE.  hOWEVER IN THE SIGMOID COLON AT ABOUT 50 CM FROM THE ANUS FLAT POLYP SEEN. , PATHOLOGY-- LARGE INTESTINAL TYPE MUCOSA WITH MIXED FEATURES OF     COLONOSCOPY N/A 5/2/2022    COLONOSCOPY POLYPECTOMY SNARE/COLD
Hospitalist Progress Note  6/21/2023 7:00 PM  Subjective:   Admit Date: 6/19/2023  PCP: Yelena Lara MD     Prior      C/c:  Chief Complaint   Patient presents with    Wound Infection         Interval History: doing slightly better, had ID of abscess awaiting wound vac for home    Diet: ADULT DIET; Regular                                ip days:2  Medications:   Scheduled Meds:   vancomycin  1,250 mg IntraVENous Q18H    sodium chloride flush  5-40 mL IntraVENous 2 times per day    famotidine (PEPCID) injection  20 mg IntraVENous BID    cefepime  2,000 mg IntraVENous Q8H    vancomycin (VANCOCIN) intermittent dosing (placeholder)   Other RX Placeholder    atenolol  25 mg Oral Nightly    lisinopril  20 mg Oral Daily    And    hydroCHLOROthiazide  12.5 mg Oral Daily     Continuous Infusions:   sodium chloride      sodium chloride 100 mL/hr at 06/21/23 1740     PRN Meds:.sodium chloride flush, sodium chloride, oxyCODONE-acetaminophen, fentanNYL, fentanNYL, sodium chloride flush, ondansetron, acetaminophen     CBC:   Recent Labs     06/19/23  1402 06/20/23  0822 06/21/23  0615   WBC 6.5 5.0 4.9   HGB 13.2 12.1 11.4*    210 210     BMP:    Recent Labs     06/19/23  1455 06/20/23  0822 06/21/23  0615    140 138   K 4.8 4.9 4.4    109* 107   CO2 22 22 21   BUN 22 18 16   CREATININE 0.88 0.78 0.76   GLUCOSE 96 117* 149*     Hepatic:   Recent Labs     06/19/23  1455   AST 15   ALT 12   BILITOT 0.4   ALKPHOS 95     Troponin: No results for input(s): TROPONINI in the last 72 hours. BNP: No results for input(s): BNP in the last 72 hours. Lipids: No results for input(s): CHOL, HDL in the last 72 hours. Invalid input(s): LDLCALCU  INR: No results for input(s): INR in the last 72 hours.     Objective:   Vitals: /71   Pulse 61   Temp 98.3 °F (36.8 °C) (Oral)   Resp 17   Ht 5' 6\" (1.676 m)   Wt 190 lb (86.2 kg)   LMP 06/26/1997   SpO2 96%   BMI 30.67 kg/m²   General appearance: alert,
Patient discharged with belongings. Patients wounds vac was switched over to personal would vac. All wound vac supplies sent home with patient.
Patient provided medical update and welcomed prayer     06/21/23 1717   Encounter Summary   Encounter Overview/Reason  Spiritual/Emotional Needs   Service Provided For: Patient   Referral/Consult From: Rounding   Support System Spouse   Last Encounter  06/21/23   Complexity of Encounter Moderate   Spiritual/Emotional needs   Type Spiritual Support   Assessment/Intervention/Outcome   Assessment Coping; Hopeful;Powerlessness   Intervention Active listening;Discussed illness injury and its impact; Explored/Affirmed feelings, thoughts, concerns;Prayer (assurance of)/[place];Sustaining Presence/Ministry of presence   Outcome Coping;Engaged in conversation;Expressed feelings, needs, and concerns;Expressed Gratitude;Receptive
Patient states that she has had issues with late surgeries and her blood sugar dropping into the 40's while NPO. Dr. Radha Michael paged and notified. Orders received to change IV fluids to D50.9%NS.
Patient was seen and examined. Awake alert in no acute distress. Afebrile vital signs are stable. Wound VAC in place over the left upper quadrant. Paper signed. No significant cellulitis at this time. No fever. Continue local wound care. Antibiotics. Discharge planning.
Pharmacy Medication History Note      List of current medications patient is taking is complete. Source of information: patient, dispense history, OARRS    Changes made to medication list:  Medications flagged for removal (include reason, ex. noncompliance):  Multivitamin: not taking    Medications removed (include reason, ex. therapy complete or physician discontinued):  Amoxicillin: course complete  Atenolol: dose adjustment  Cholecalciferol: dose adjustment  Ciprodex: course complete    Medications added/doses adjusted:  Atenolol 25 mg once daily added  Cholecalciferol 2000 units daily added    Other notes (ex. Recent course of antibiotics, Coumadin dosing):  Cephalexin 500 mg three times daily for 20 days started 6/14/23  OARRS negative    Denies use of other OTC or herbal medications.       Allergies clarified    Medication list provided to the patient: no  Medication education provided to the patient: no      Electronically signed by Latricia Uribe on 6/19/2023 at 5:46 PM
Pt arrived to room 2076. VSS. Pt alert and oriented. Admission questions and assessment complete. Pt resting in bed in no distress.
SC visit with patient and her  prior to surgery; listening presence and support; welcomed prayer     06/20/23 1525   Encounter Summary   Encounter Overview/Reason  Spiritual/Emotional Needs   Service Provided For: Patient and family together   Referral/Consult From: [facility]   Support System Spouse   Last Encounter  06/20/23   Complexity of Encounter Moderate   Encounter    Type Pre-Procedural   Spiritual/Emotional needs   Type Spiritual Support   Assessment/Intervention/Outcome   Assessment Anxious; Coping; Hopeful;Powerlessness   Intervention Active listening;Discussed illness injury and its impact; Explored/Affirmed feelings, thoughts, concerns;Prayer (assurance of)/[place];Sustaining Presence/Ministry of presence   Outcome Coping;Engaged in conversation;Expressed feelings, needs, and concerns;Expressed Gratitude;Receptive
Vancomycin Dosing by Pharmacy - Daily Note   Vancomycin Therapy Day:  2  Indication: Abscess    Allergies:  Lidocaine-epinephrine, Avelox [moxifloxacin], Biaxin [clarithromycin], Epinephrine, Neosporin [neomycin-polymyxin-gramicidin], Nickel, Sulfa antibiotics, Adhesive tape, and Phenylephrine   Actual Weight:    Wt Readings from Last 1 Encounters:   06/19/23 190 lb (86.2 kg)       Labs/Ancillary Data  Estimated Creatinine Clearance: 75 mL/min (based on SCr of 0.78 mg/dL). Recent Labs     06/19/23  1402 06/19/23  1455 06/20/23  0822   CREATININE  --  0.88 0.78   BUN  --  22 18   WBC 6.5  --  5.0     No results found for: PROCAL  No intake or output data in the 24 hours ending 06/20/23 1310  Temp: 99    Culture Date / Source  /  Results  Pending  Recent vancomycin administrations                     vancomycin (VANCOCIN) 750 mg in sodium chloride 0.9 % 250 mL IVPB ([id number]) (mg) 750 mg New Bag 06/20/23 0923    vancomycin (VANCOCIN) 2,000 mg in sodium chloride 0.9 % 500 mL IVPB (mg) 2,000 mg New Bag 06/19/23 1948                    Vancomycin Concentrations:   TROUGH:  No results for input(s): VANCOTROUGH in the last 72 hours. RANDOM:  No results for input(s): VANCORANDOM in the last 72 hours. MRSA Nasal Swab: N/A. Non-respiratory infection. Rebekah Nichole PLAN     Continue current dose of 750 mg q12h IV  Ensured BUN/sCr ordered at baseline and every 48 hours x at least 3 levels, then at least weekly.   Repeat vancomycin concentration ordered for 6/21 @ 0600   Pharmacy will continue to monitor patient and adjust therapy as indicated      Vancomycin Target Concentration Parameters  Treatment  Population Target AUC/MARTHA Target Trough   Invasive MRSA Infection (bacteremia, pneumonia, meningitis, endocarditis, osteomyelitis)  Sepsis (undifferentiated) 400-600 N/A   Infection due to non-MRSA pathogen  Empiric treatment of non-invasive MRSA infection  (SSTI, UTI) <500 10-15 mg/L   CrCl < 29 mL/min  Rapidly fluctuating serum
Vancomycin Dosing by Pharmacy - Daily Note   Vancomycin Therapy Day:  4  Indication: abdominal wall abscess s/p I&D    Allergies:  Lidocaine-epinephrine, Avelox [moxifloxacin], Biaxin [clarithromycin], Epinephrine, Neosporin [neomycin-polymyxin-gramicidin], Nickel, Sulfa antibiotics, Adhesive tape, and Phenylephrine   Actual Weight:    Wt Readings from Last 1 Encounters:   06/19/23 190 lb (86.2 kg)       Labs/Ancillary Data  Estimated Creatinine Clearance: 77 mL/min (based on SCr of 0.76 mg/dL). Recent Labs     06/19/23  1402 06/19/23  1455 06/20/23  0822 06/21/23  0615   CREATININE  --  0.88 0.78 0.76   BUN  --  22 18 16   WBC 6.5  --  5.0 4.9     No results found for: PROCAL    Intake/Output Summary (Last 24 hours) at 6/22/2023 0755  Last data filed at 6/21/2023 2253  Gross per 24 hour   Intake 360 ml   Output --   Net 360 ml     Temp: 97.5    Culture Date / Source  /  Results  See micro  Recent vancomycin administrations                     vancomycin (VANCOCIN) 1,250 mg in sodium chloride 0.9 % 250 mL IVPB ([id number]) (mg) 1,250 mg New Bag 06/22/23 0232     1,250 mg New Bag 06/21/23 0844    vancomycin (VANCOCIN) 750 mg in sodium chloride 0.9 % 250 mL IVPB ([id number]) (mg) 750 mg New Bag 06/20/23 2005     750 mg New Bag  0923    vancomycin (VANCOCIN) 2,000 mg in sodium chloride 0.9 % 500 mL IVPB (mg) 2,000 mg New Bag 06/19/23 1948                    Vancomycin Concentrations:   TROUGH:  No results for input(s): VANCOTROUGH in the last 72 hours. RANDOM:    Recent Labs     06/21/23  0615   VANCORANDOM 13.5       MRSA Nasal Swab: N/A. Non-respiratory infection. Drake Felix PLAN     Continue current dose of 1250 mg q18h IV  Ensured BUN/sCr ordered at baseline and every 48 hours x at least 3 levels, then at least weekly.   Repeat vancomycin concentration ordered for 06/23 @ 0600   Pharmacy will continue to monitor patient and adjust therapy as indicated      Vancomycin Target Concentration Parameters  Treatment  Population
Vancomycin Dosing by Pharmacy - Initial Note   TODAY'S DATE:  6/19/2023  Patient name, age:  Gabe Marlow, 69 y.o. Indication:  Abscess  Additional antimicrobials:  Cefepime    Allergies:  Lidocaine-epinephrine, Avelox [moxifloxacin], Biaxin [clarithromycin], Epinephrine, Neosporin [neomycin-polymyxin-gramicidin], Nickel, Sulfa antibiotics, Adhesive tape, and Phenylephrine   Actual Weight:    Wt Readings from Last 1 Encounters:   06/19/23 190 lb (86.2 kg)     Labs/Ancillary Data  Estimated Creatinine Clearance: 67 mL/min (based on SCr of 0.88 mg/dL). Recent Labs     06/19/23  1402 06/19/23  1455   CREATININE  --  0.88   BUN  --  22   WBC 6.5  --      No results found for: PROCAL  No intake or output data in the 24 hours ending 06/19/23 1949  Temp: 97.5 F    Recent vancomycin administrations                     vancomycin (VANCOCIN) 2,000 mg in sodium chloride 0.9 % 500 mL IVPB (mg) 2,000 mg New Bag 06/19/23 1948                  Culture Date / Source  /  Results  See micro    MRSA Nasal Swab: N/A. Non-respiratory infection. Davis Dawn PLAN   Initial loading dose of 25mg/kg (max of 2,500 mg) = 2000  mg  x 1, then  vancomycin 750 mg IV every 12 hours. Ensured BUN/sCr ordered at baseline and every 48 hours x at least 3 levels, then at least weekly. Vancomycin level ordered for @ . Will use bayesian method for dosing. This level will not be a trough. Target AUC/MARTHA 400-500, with trough goal estimate of 10-15.       Vancomycin Target Concentration Parameters  Treatment  Population Target AUC/MARTHA Target Trough   Invasive MRSA Infection (bacteremia, pneumonia, meningitis, endocarditis, osteomyelitis)  Sepsis (undifferentiated) 400-600 N/A   Infection due to non-MRSA pathogen  Empiric treatment of non-invasive MRSA infection  (SSTI, UTI) <500 10-15 mg/L   CrCl < 29 mL/min  Rapidly fluctuating serum creatinine   JONG N/A < 15 mg/L     Renal replacement therapy is dosed by levels, per hospital
Writer spoke with Dr. Mohan Hallman. Order received to resume home medication.
non-MRSA pathogen  Empiric treatment of non-invasive MRSA infection  (SSTI, UTI) <500 10-15 mg/L   CrCl < 29 mL/min  Rapidly fluctuating serum creatinine   JONG N/A < 15 mg/L     Renal replacement therapy is dosed by levels, per hospital protocol. Abbreviations  * Pauc: probability that AUC is >400 (efficacy); Pconc: probability that Ctrough is above 20 ?g/mL (toxicity); Tox: Probability of nephrotoxicity, based on Monica et al. Clin Infect Dis 2009. Loading dose: N/A  Regimen: 1250 mg IV every 18 hours. Start time: 08:05 on 06/21/2023  Exposure target: AUC24 (range)400-500 mg/L.hr   AUC24,ss: 438 mg/L.hr  Probability of AUC24 > 400: 68 %  Ctrough,ss: 12.8 mg/L  Probability of Ctrough,ss > 20: 6 %  Probability of nephrotoxicity (Monica OBIE 2009): 8 %      Thank you for the consult. Pharmacy will continue to follow.   Desiree Cruz PharmD, BCPS 6/21/2023 7:03 AM

## 2023-06-25 LAB
MICROORGANISM SPEC CULT: ABNORMAL
MICROORGANISM SPEC CULT: ABNORMAL
MICROORGANISM/AGENT SPEC: ABNORMAL
MICROORGANISM/AGENT SPEC: ABNORMAL
SPECIMEN DESCRIPTION: ABNORMAL

## 2023-07-04 NOTE — DISCHARGE INSTRUCTIONS
[facility]      Visit  Discharge Instructions / Physician Orders  DATE:7/7/23     Home Care: [facility]     SUPPLIES ORDERED THRU:                     DATE LAST SUPPLIED     Wound Location:  ABD     Cleanse with: Liquid antibacterial soap and water, rinse well      Dressing Orders:  Primary dressing                       Secondary dressing                           secure with           x 30days   Left abdomen: Cleanse with saline, pat dry. Skin prep and drape to periwound. Fill wound with black foam wick corner into tunnel the apply positive bolster over tunneled area and cover with drape. Apply KCI vac at 125mmhg continuous suction. Change every MWF. If suction is lost, remove entire dressing and apply saline wet to dry dressing twice daily until wound vac can be reapplied. (Discharge from Hospital orders)     Frequency:  Change MWF     Additional Orders: Increase protein to diet (meat, cheese, eggs, fish, peanut butter, nuts and beans)  Multivitamin daily    OFFLOADING [] YES  TYPE:                  [] NA    Weekly wound care visits until determined otherwise. Antibiotic therapy-wound care related YES [x] NO [] NA[]  cephALEXin  doxycycline hyclate          MY CHART []     Smart Device  []     HYPERBARIC TREATMENT-                TREATMENT #                          Your next appointment with the [address] [place] [address] is in 1 week                                                                                                   (Please note your next appointment above and if you are unable to keep, kindly give a 24 hour notice.  Thank you.)  If more than 15 min late we cannot guarantee you will be seen due to clinician schedule  Per Policy, Excessive cancellation will call for dismissal from program.  If you experience any of the following, please call the [address] [place] [address] during business hours:  [phone]     * Increase in Pain  * Temperature over 101  * Increase in drainage

## 2023-07-07 ENCOUNTER — HOSPITAL ENCOUNTER (OUTPATIENT)
Dept: WOUND CARE | Age: 68
Discharge: HOME | End: 2023-07-07
Payer: MEDICARE

## 2023-07-07 VITALS
DIASTOLIC BLOOD PRESSURE: 87 MMHG | HEIGHT: 66 IN | HEART RATE: 58 BPM | RESPIRATION RATE: 18 BRPM | WEIGHT: 190 LBS | SYSTOLIC BLOOD PRESSURE: 158 MMHG | TEMPERATURE: 98.4 F | BODY MASS INDEX: 30.53 KG/M2

## 2023-07-07 DIAGNOSIS — S31.109A OPEN WOUND OF ABDOMINAL WALL, INITIAL ENCOUNTER: ICD-10-CM

## 2023-07-07 PROCEDURE — 97605 NEG PRS WND THER DME<=50SQCM: CPT

## 2023-07-07 PROCEDURE — 99213 OFFICE O/P EST LOW 20 MIN: CPT

## 2023-07-07 PROCEDURE — 11042 DBRDMT SUBQ TIS 1ST 20SQCM/<: CPT

## 2023-07-07 RX ORDER — LIDOCAINE HYDROCHLORIDE 40 MG/ML
SOLUTION TOPICAL ONCE
OUTPATIENT
Start: 2023-07-07 | End: 2023-07-07

## 2023-07-07 RX ORDER — SODIUM CHLOR/HYPOCHLOROUS ACID 0.033 %
SOLUTION, IRRIGATION IRRIGATION ONCE
OUTPATIENT
Start: 2023-07-07 | End: 2023-07-07

## 2023-07-07 RX ORDER — BACITRACIN ZINC AND POLYMYXIN B SULFATE 500; 1000 [USP'U]/G; [USP'U]/G
OINTMENT TOPICAL ONCE
OUTPATIENT
Start: 2023-07-07 | End: 2023-07-07

## 2023-07-07 RX ORDER — IBUPROFEN 200 MG
TABLET ORAL ONCE
OUTPATIENT
Start: 2023-07-07 | End: 2023-07-07

## 2023-07-07 RX ORDER — CLOBETASOL PROPIONATE 0.5 MG/G
OINTMENT TOPICAL ONCE
OUTPATIENT
Start: 2023-07-07 | End: 2023-07-07

## 2023-07-07 RX ORDER — LIDOCAINE HYDROCHLORIDE 20 MG/ML
JELLY TOPICAL ONCE
OUTPATIENT
Start: 2023-07-07 | End: 2023-07-07

## 2023-07-07 RX ORDER — LIDOCAINE 50 MG/G
OINTMENT TOPICAL ONCE
OUTPATIENT
Start: 2023-07-07 | End: 2023-07-07

## 2023-07-07 RX ORDER — BETAMETHASONE DIPROPIONATE 0.05 %
OINTMENT (GRAM) TOPICAL ONCE
OUTPATIENT
Start: 2023-07-07 | End: 2023-07-07

## 2023-07-07 RX ORDER — METRONIDAZOLE 250 MG/1
250 TABLET ORAL 3 TIMES DAILY
Qty: 21 TABLET | Refills: 0 | Status: SHIPPED | OUTPATIENT
Start: 2023-07-07 | End: 2023-07-14

## 2023-07-07 RX ORDER — GENTAMICIN SULFATE 1 MG/G
OINTMENT TOPICAL ONCE
OUTPATIENT
Start: 2023-07-07 | End: 2023-07-07

## 2023-07-07 RX ORDER — LIDOCAINE 40 MG/G
CREAM TOPICAL ONCE
OUTPATIENT
Start: 2023-07-07 | End: 2023-07-07

## 2023-07-07 RX ORDER — OXYCODONE HYDROCHLORIDE 5 MG/1
5 TABLET ORAL EVERY 6 HOURS PRN
COMMUNITY

## 2023-07-07 RX ORDER — GINSENG 100 MG
CAPSULE ORAL ONCE
OUTPATIENT
Start: 2023-07-07 | End: 2023-07-07

## 2023-07-07 ASSESSMENT — PAIN SCALES - GENERAL: PAINLEVEL_OUTOF10: 0

## 2023-07-07 NOTE — PLAN OF CARE
Problem: Discharge Planning  Goal: Discharge to home or other facility with appropriate resources  Outcome: Progressing     Problem: Safety - Adult  Goal: Free from fall injury  Outcome: Progressing     Problem: ABCDS Injury Assessment  Goal: Absence of physical injury  Outcome: Progressing  Flowsheets (Taken 7/7/2023 1100 by Richard Hinton RN)  Absence of Physical Injury: Implement safety measures based on patient assessment     Problem: Cognitive:  Goal: Knowledge of wound care  Description: Knowledge of wound care  Outcome: Progressing  Goal: Understands risk factors for wounds  Description: Understands risk factors for wounds  Outcome: Progressing

## 2023-07-07 NOTE — PROGRESS NOTES
Negative Pressure    NAME:  Claude Walls  YOB: 1955  MEDICAL RECORD NUMBER:  [id number]  DATE:  7/7/2023    Applied Negative Pressure to abdomen wound(s)/ulcer(s). [x] Applied skin barrier prep to catherine-wound. [x] Cut strips of plastic drape to picture frame wound so that catherine-wound is     covered with the drape. [x] If bridging dressing to less prominent site, cover any intact skin that will come in contact with the Negative Pressure Therapy sponge, gauze or channel drain with plastic drape. The sponge should never touch intact skin. [x] Cut sponge, gauze or channel drain to size which will fit into the wound/ulcer bed without being forced. [x] Be sure the sponge is large enough to hold the entire round plastic flange which is attached to the tubing. Never allow flange to be larger than the sponge or it will produce suction damaging intact skin. Total number of individual pieces of foam used within the wound bed: 1    [x] If bridging the dressing away from the primary site, be sure the bridge leads to a piece of sponge large enough to hold the entire flange without allowing any of the flange to overlap onto intact skin. [x] Covered sponge, gauze or channel drain with plastic drape. [x] Cut a hole in this plastic drape directly over the sponge the same size as the plastic drain tubing. [x] Removed plastic liner from flange and apply it directly over the hole you cut. [x] Removed the plastic cover from the flange. [x] Attached the tubing to the wound/ulcer Negative Pressure Therapy and turn it on to be sure a vacuum is created and that there are no leaks. [x] If air leaks occur, use plastic drape to patch them. [x] Secured Negative Pressure Therapy dressing with ace wrap loosely if located on an extremity. Maintain tubing outside of ace wrap. Tubing must not exert pressure on intact skin.     Applied per  Guidelines      Electronically signed by Darlene Tamez RN
drainage noted 07/07/23 1113   Wound Cleansed Soap and water 07/07/23 1113   Wound Length (cm) 3 cm 07/07/23 1113   Wound Width (cm) 4.9 cm 07/07/23 1113   Wound Depth (cm) 0.4 cm 07/07/23 1113   Wound Surface Area (cm^2) 14.7 cm^2 07/07/23 1113   Wound Volume (cm^3) 5.88 cm^3 07/07/23 1113   Post-Procedure Length (cm) 3 cm 07/07/23 1113   Post-Procedure Width (cm) 4.9 cm 07/07/23 1113   Post-Procedure Depth (cm) 0.4 cm 07/07/23 1113   Post-Procedure Surface Area (cm^2) 14.7 cm^2 07/07/23 1113   Post-Procedure Volume (cm^3) 5.88 cm^3 07/07/23 1113   Wound Assessment Granulation tissue;Slough 07/07/23 1113   Drainage Amount Moderate 07/07/23 1113   Drainage Description Serosanguinous; Yellow 07/07/23 1113   Odor None 07/07/23 1113   Caitlin-wound Assessment Blanchable erythema 07/07/23 1113   Margins Defined edges 07/07/23 1113   Wound Thickness Description not for Pressure Injury Full thickness 07/07/23 1113   Number of days: 0          Percent of Wound(s)/Ulcer(s) Debrided: 100%    Total Surface Area Debrided:  14.7 sq cm             Estimated Blood Loss:  Minimal    Hemostasis Achieved:  by pressure    Procedural Pain:  3  / 10     Post Procedural Pain:  1 / 10     Response to treatment:  Well tolerated by patient. Plan:     Treatment Note please see Discharge Instructions    Written patient dismissal instructions given to patient and signed by patient or POA.              Electronically signed by Cuong Echols MD on 7/7/2023 at 11:43 AM

## 2023-07-13 NOTE — DISCHARGE INSTRUCTIONS
go to the nearest emergency room. The information contained in the After Visit Summary has been reviewed with me, the patient and/or responsible adult, by my health care provider(s). I had the opportunity to ask questions regarding this information.  I have elected to receive;      []After Visit Summary  [x]Comprehensive Discharge Instruction        Patient signature______________________________________Date:________  Electronically signed by Eyal Ricardo MD on 7/14/2023 at 10:59 AM

## 2023-07-14 ENCOUNTER — HOSPITAL ENCOUNTER (OUTPATIENT)
Dept: WOUND CARE | Age: 68
Discharge: HOME | End: 2023-07-14
Payer: MEDICARE

## 2023-07-14 VITALS
BODY MASS INDEX: 30.53 KG/M2 | HEIGHT: 66 IN | RESPIRATION RATE: 18 BRPM | SYSTOLIC BLOOD PRESSURE: 147 MMHG | DIASTOLIC BLOOD PRESSURE: 80 MMHG | WEIGHT: 190 LBS | TEMPERATURE: 97.2 F | HEART RATE: 61 BPM

## 2023-07-14 DIAGNOSIS — S31.109A OPEN WOUND OF ABDOMINAL WALL, INITIAL ENCOUNTER: Primary | ICD-10-CM

## 2023-07-14 PROCEDURE — 11042 DBRDMT SUBQ TIS 1ST 20SQCM/<: CPT

## 2023-07-14 RX ORDER — SODIUM CHLOR/HYPOCHLOROUS ACID 0.033 %
SOLUTION, IRRIGATION IRRIGATION ONCE
OUTPATIENT
Start: 2023-07-14 | End: 2023-07-14

## 2023-07-14 RX ORDER — LIDOCAINE 40 MG/G
CREAM TOPICAL ONCE
OUTPATIENT
Start: 2023-07-14 | End: 2023-07-14

## 2023-07-14 RX ORDER — LIDOCAINE HYDROCHLORIDE 20 MG/ML
JELLY TOPICAL ONCE
OUTPATIENT
Start: 2023-07-14 | End: 2023-07-14

## 2023-07-14 RX ORDER — CLOBETASOL PROPIONATE 0.5 MG/G
OINTMENT TOPICAL ONCE
OUTPATIENT
Start: 2023-07-14 | End: 2023-07-14

## 2023-07-14 RX ORDER — LIDOCAINE 50 MG/G
OINTMENT TOPICAL ONCE
OUTPATIENT
Start: 2023-07-14 | End: 2023-07-14

## 2023-07-14 RX ORDER — GINSENG 100 MG
CAPSULE ORAL ONCE
OUTPATIENT
Start: 2023-07-14 | End: 2023-07-14

## 2023-07-14 RX ORDER — IBUPROFEN 200 MG
TABLET ORAL ONCE
OUTPATIENT
Start: 2023-07-14 | End: 2023-07-14

## 2023-07-14 RX ORDER — LIDOCAINE HYDROCHLORIDE 40 MG/ML
SOLUTION TOPICAL ONCE
OUTPATIENT
Start: 2023-07-14 | End: 2023-07-14

## 2023-07-14 RX ORDER — LIDOCAINE HYDROCHLORIDE 40 MG/ML
SOLUTION TOPICAL ONCE
Status: DISCONTINUED | OUTPATIENT
Start: 2023-07-14 | End: 2023-07-15 | Stop reason: HOSPADM

## 2023-07-14 RX ORDER — BETAMETHASONE DIPROPIONATE 0.05 %
OINTMENT (GRAM) TOPICAL ONCE
OUTPATIENT
Start: 2023-07-14 | End: 2023-07-14

## 2023-07-14 RX ORDER — BACITRACIN ZINC AND POLYMYXIN B SULFATE 500; 1000 [USP'U]/G; [USP'U]/G
OINTMENT TOPICAL ONCE
OUTPATIENT
Start: 2023-07-14 | End: 2023-07-14

## 2023-07-14 RX ORDER — GENTAMICIN SULFATE 1 MG/G
OINTMENT TOPICAL ONCE
OUTPATIENT
Start: 2023-07-14 | End: 2023-07-14

## 2023-07-14 RX ORDER — LIDOCAINE HYDROCHLORIDE 20 MG/ML
JELLY TOPICAL ONCE
Status: COMPLETED | OUTPATIENT
Start: 2023-07-14 | End: 2023-07-14

## 2023-07-14 RX ADMIN — LIDOCAINE HYDROCHLORIDE 5 ML: 20 JELLY TOPICAL at 10:29

## 2023-07-14 NOTE — PROGRESS NOTES
2010    COLONOSCOPY  9/2009    small hemorrhoids    COLONOSCOPY  7/2002    hemohhoids, hyperplastic polyp, right colon pathology-chronic inflammation in lumina propria with focal actue cryptitis consistent with active colitis    COLONOSCOPY  02/17/2016    flat sigmoid [id number]    COLONOSCOPY  11/18/2017    NO RECURRENT POLYPS SEEN AT THE PREVIOUS POLYPECTOMY SITE.  hOWEVER IN THE SIGMOID COLON AT ABOUT 50 CM FROM THE ANUS FLAT POLYP SEEN. , PATHOLOGY-- LARGE INTESTINAL TYPE MUCOSA WITH MIXED FEATURES OF     COLONOSCOPY N/A 5/2/2022    COLONOSCOPY POLYPECTOMY SNARE/COLD BIOPSY performed by Ramírez Brenner MD at [facility]  [facility]    KNEE ARTHROSCOPY Left 10/6/2020    KNEE ARTHROSCOPY PARTIAL MEDIAL MENISCECTOMY performed by Karen Ng MD at [facility] W/REMOVAL LESION BY HOT [address] [place] N/A 11/18/2017    COLONOSCOPY POLYPECTOMY HOT BIOPSY, COLD BIOPSY, APC SIGMOID, SPOT MARKING SIGMOID performed by Ramírez Brenner MD at [address]  [address]    hemorrhoids, no colitis seen    TONSILLECTOMY AND ADENOIDECTOMY      child     TOTAL KNEE ARTHROPLASTY Left 8/12/2021    KNEE TOTAL ARTHROPLASTY performed by Karen Ng MD at [address] [facility] N/A 5/2/2022    EGD BIOPSY COLD SNARE performed by Ramírez Brenner MD at [address]    Family History   Problem Relation Age of Onset    Cancer Father 28        bone sarcoma    Hypertension Mother     Other Mother         peripheral neuropathy    Uterine Cancer Maternal Aunt     Breast Cancer Other         dx first before age 48 and again @ 79    Colon Cancer Maternal Aunt         all dx before age 48    Colon Cancer Maternal Uncle         1 uncle in 42's dx, other in 66's    Colon Cancer Other         multiple cousins dx before age 48    Uterine Cancer Paternal Aunt         dx after    Ovarian Cancer Paternal Aunt

## 2023-07-18 NOTE — DISCHARGE INSTRUCTIONS
Summary has been reviewed with me, the patient and/or responsible adult, by my health care provider(s). I had the opportunity to ask questions regarding this information.  I have elected to receive;      []After Visit Summary  [x]Comprehensive Discharge Instruction        Patient signature______________________________________Date:________  Electronically signed by Kayley Aguillon RN on 7/21/2023 at 10:05 AM  Electronically signed by Dennie Barber, MD on 7/21/2023 at 10:08 AM

## 2023-07-21 ENCOUNTER — HOSPITAL ENCOUNTER (OUTPATIENT)
Dept: WOUND CARE | Age: 68
Discharge: HOME | End: 2023-07-21
Payer: MEDICARE

## 2023-07-21 VITALS
TEMPERATURE: 96.7 F | RESPIRATION RATE: 18 BRPM | WEIGHT: 190 LBS | HEIGHT: 66 IN | BODY MASS INDEX: 30.53 KG/M2 | SYSTOLIC BLOOD PRESSURE: 158 MMHG | HEART RATE: 60 BPM | DIASTOLIC BLOOD PRESSURE: 83 MMHG

## 2023-07-21 DIAGNOSIS — S31.109A OPEN WOUND OF ABDOMINAL WALL, INITIAL ENCOUNTER: Primary | ICD-10-CM

## 2023-07-21 PROCEDURE — 6370000000 HC RX 637 (ALT 250 FOR IP): Performed by: INTERNAL MEDICINE

## 2023-07-21 PROCEDURE — 11042 DBRDMT SUBQ TIS 1ST 20SQCM/<: CPT

## 2023-07-21 RX ORDER — LIDOCAINE 40 MG/G
CREAM TOPICAL ONCE
OUTPATIENT
Start: 2023-07-21 | End: 2023-07-21

## 2023-07-21 RX ORDER — LIDOCAINE HYDROCHLORIDE 20 MG/ML
JELLY TOPICAL ONCE
OUTPATIENT
Start: 2023-07-21 | End: 2023-07-21

## 2023-07-21 RX ORDER — LIDOCAINE 50 MG/G
OINTMENT TOPICAL ONCE
OUTPATIENT
Start: 2023-07-21 | End: 2023-07-21

## 2023-07-21 RX ORDER — IBUPROFEN 200 MG
TABLET ORAL ONCE
OUTPATIENT
Start: 2023-07-21 | End: 2023-07-21

## 2023-07-21 RX ORDER — BETAMETHASONE DIPROPIONATE 0.05 %
OINTMENT (GRAM) TOPICAL ONCE
OUTPATIENT
Start: 2023-07-21 | End: 2023-07-21

## 2023-07-21 RX ORDER — CLOBETASOL PROPIONATE 0.5 MG/G
OINTMENT TOPICAL ONCE
OUTPATIENT
Start: 2023-07-21 | End: 2023-07-21

## 2023-07-21 RX ORDER — GINSENG 100 MG
CAPSULE ORAL ONCE
OUTPATIENT
Start: 2023-07-21 | End: 2023-07-21

## 2023-07-21 RX ORDER — GENTAMICIN SULFATE 1 MG/G
OINTMENT TOPICAL ONCE
OUTPATIENT
Start: 2023-07-21 | End: 2023-07-21

## 2023-07-21 RX ORDER — LIDOCAINE HYDROCHLORIDE 20 MG/ML
JELLY TOPICAL ONCE
Status: COMPLETED | OUTPATIENT
Start: 2023-07-21 | End: 2023-07-21

## 2023-07-21 RX ORDER — BACITRACIN ZINC AND POLYMYXIN B SULFATE 500; 1000 [USP'U]/G; [USP'U]/G
OINTMENT TOPICAL ONCE
OUTPATIENT
Start: 2023-07-21 | End: 2023-07-21

## 2023-07-21 RX ORDER — LIDOCAINE HYDROCHLORIDE 40 MG/ML
SOLUTION TOPICAL ONCE
OUTPATIENT
Start: 2023-07-21 | End: 2023-07-21

## 2023-07-21 RX ORDER — SODIUM CHLOR/HYPOCHLOROUS ACID 0.033 %
SOLUTION, IRRIGATION IRRIGATION ONCE
OUTPATIENT
Start: 2023-07-21 | End: 2023-07-21

## 2023-07-21 RX ADMIN — LIDOCAINE HYDROCHLORIDE 2 ML: 20 JELLY TOPICAL at 09:40

## 2023-07-21 ASSESSMENT — PAIN SCALES - GENERAL: PAINLEVEL_OUTOF10: 0

## 2023-07-21 NOTE — PLAN OF CARE
Problem: Discharge Planning  Goal: Discharge to home or other facility with appropriate resources  Outcome: Progressing     Problem: Cognitive:  Goal: Knowledge of wound care  Description: Knowledge of wound care  Outcome: Progressing     Problem: Wound:  Goal: Will show signs of wound healing; wound closure and no evidence of infection  Description: Will show signs of wound healing; wound closure and no evidence of infection  Outcome: Progressing     Problem: Falls - Risk of:  Goal: Will remain free from falls  Description: Will remain free from falls  Outcome: Progressing

## 2023-07-21 NOTE — PROGRESS NOTES
[address] [place] [address]   Progress Note and Procedure Note      Luisa Roblero  MEDICAL RECORD NUMBER:  [id number]  AGE: 69 y.o. GENDER: female  : 1955  EPISODE DATE:  2023    Subjective:     Chief Complaint   Patient presents with    Wound Check     abdominal         HISTORY of PRESENT ILLNESS HPI     Luisa Roblero is a 69 y.o. female who presents today for wound/ulcer evaluation. History of Wound Context: The patient is here for  nonhealing abdominal wound with no surrounding erythema, no purulent drainage. She denied pain, no fever or chills, no other complaints. The patient had the surgery for incision and drainage infected epidermal cyst left upper outer quadrant anterior abdominal wall on 2023. Tissue culture grewFINEGOLDIA MAGNA LIGHT GROWTH    She was treated with p.o. doxycycline and Keflex for 2 weeks Followed by 1 week of Flagyl that was completed          PAST MEDICAL HISTORY        Diagnosis Date    Arthritis     Bleb, lung ([address])     causes  pneumothorax     Colon polyps 2017    COPD (chronic obstructive pulmonary disease) (HCC)     COVID-19     GERD (gastroesophageal reflux disease)     Heart palpitations     History of colon polyps     Hyperlipidemia     Hyperplastic colon polyp 2016    Hypertension     Kidney infection 2004    Pneumothorax     spontaneous x 2, chest tube    PONV (postoperative nausea and vomiting)     Supraventricular tachycardia ([address])     first episode about 20 years ago, well controlled now with Atenolol       PAST SURGICAL HISTORY    Past Surgical History:   Procedure Laterality Date    ABDOMEN SURGERY Left 2023    Incision and drainage infected epidermal cyst left upper outer quadrant anterior abdominal wall performed by Jessica Francisco MD at [facility], LAPAROSCOPIC      COLONOSCOPY  2009    small hemorrhoids    COLONOSCOPY  2002    hemohhoids, hyperplastic polyp, right colon

## 2023-07-25 NOTE — DISCHARGE INSTRUCTIONS
me, the patient and/or responsible adult, by my health care provider(s). I had the opportunity to ask questions regarding this information.  I have elected to receive;      []After Visit Summary  [x]Comprehensive Discharge Instruction        Patient signature______________________________________Date:________  Electronically signed by Luh Landa RN on 7/28/2023 at 11:45 AM  Electronically signed by LORNA Schumacher CNP on 7/28/2023 at 11:58 AM

## 2023-07-28 ENCOUNTER — HOSPITAL ENCOUNTER (OUTPATIENT)
Dept: WOUND CARE | Age: 68
Discharge: HOME | End: 2023-07-28
Payer: MEDICARE

## 2023-07-28 VITALS
RESPIRATION RATE: 17 BRPM | WEIGHT: 190 LBS | SYSTOLIC BLOOD PRESSURE: 136 MMHG | HEART RATE: 68 BPM | TEMPERATURE: 97.1 F | DIASTOLIC BLOOD PRESSURE: 79 MMHG | HEIGHT: 66 IN | BODY MASS INDEX: 30.53 KG/M2

## 2023-07-28 DIAGNOSIS — S31.109D OPEN WOUND ANTERIOR ABDOMINAL WALL, SUBSEQUENT ENCOUNTER: Primary | ICD-10-CM

## 2023-07-28 DIAGNOSIS — S31.109A OPEN WOUND OF ABDOMINAL WALL, INITIAL ENCOUNTER: ICD-10-CM

## 2023-07-28 PROCEDURE — 11042 DBRDMT SUBQ TIS 1ST 20SQCM/<: CPT

## 2023-07-28 RX ORDER — LIDOCAINE HYDROCHLORIDE 20 MG/ML
JELLY TOPICAL ONCE
Status: CANCELLED | OUTPATIENT
Start: 2023-07-28 | End: 2023-07-28

## 2023-07-28 RX ORDER — LIDOCAINE 50 MG/G
OINTMENT TOPICAL ONCE
Status: CANCELLED | OUTPATIENT
Start: 2023-07-28 | End: 2023-07-28

## 2023-07-28 RX ORDER — BETAMETHASONE DIPROPIONATE 0.05 %
OINTMENT (GRAM) TOPICAL ONCE
Status: CANCELLED | OUTPATIENT
Start: 2023-07-28 | End: 2023-07-28

## 2023-07-28 RX ORDER — IBUPROFEN 200 MG
TABLET ORAL ONCE
Status: CANCELLED | OUTPATIENT
Start: 2023-07-28 | End: 2023-07-28

## 2023-07-28 RX ORDER — LIDOCAINE HYDROCHLORIDE 20 MG/ML
JELLY TOPICAL ONCE
OUTPATIENT
Start: 2023-07-28 | End: 2023-07-28

## 2023-07-28 RX ORDER — SODIUM CHLOR/HYPOCHLOROUS ACID 0.033 %
SOLUTION, IRRIGATION IRRIGATION ONCE
OUTPATIENT
Start: 2023-07-28 | End: 2023-07-28

## 2023-07-28 RX ORDER — LIDOCAINE HYDROCHLORIDE 40 MG/ML
SOLUTION TOPICAL ONCE
Status: COMPLETED | OUTPATIENT
Start: 2023-07-28 | End: 2023-07-28

## 2023-07-28 RX ORDER — LIDOCAINE 40 MG/G
CREAM TOPICAL ONCE
Status: CANCELLED | OUTPATIENT
Start: 2023-07-28 | End: 2023-07-28

## 2023-07-28 RX ORDER — LIDOCAINE HYDROCHLORIDE 40 MG/ML
SOLUTION TOPICAL ONCE
Status: DISCONTINUED | OUTPATIENT
Start: 2023-07-28 | End: 2023-07-29 | Stop reason: HOSPADM

## 2023-07-28 RX ORDER — GINSENG 100 MG
CAPSULE ORAL ONCE
Status: CANCELLED | OUTPATIENT
Start: 2023-07-28 | End: 2023-07-28

## 2023-07-28 RX ORDER — CLOBETASOL PROPIONATE 0.5 MG/G
OINTMENT TOPICAL ONCE
Status: CANCELLED | OUTPATIENT
Start: 2023-07-28 | End: 2023-07-28

## 2023-07-28 RX ORDER — LIDOCAINE HYDROCHLORIDE 40 MG/ML
SOLUTION TOPICAL ONCE
OUTPATIENT
Start: 2023-07-28 | End: 2023-07-28

## 2023-07-28 RX ORDER — SODIUM CHLOR/HYPOCHLOROUS ACID 0.033 %
SOLUTION, IRRIGATION IRRIGATION ONCE
Status: CANCELLED | OUTPATIENT
Start: 2023-07-28 | End: 2023-07-28

## 2023-07-28 RX ORDER — BACITRACIN ZINC AND POLYMYXIN B SULFATE 500; 1000 [USP'U]/G; [USP'U]/G
OINTMENT TOPICAL ONCE
Status: CANCELLED | OUTPATIENT
Start: 2023-07-28 | End: 2023-07-28

## 2023-07-28 RX ORDER — GENTAMICIN SULFATE 1 MG/G
OINTMENT TOPICAL ONCE
Status: CANCELLED | OUTPATIENT
Start: 2023-07-28 | End: 2023-07-28

## 2023-07-28 RX ORDER — LIDOCAINE HYDROCHLORIDE 40 MG/ML
SOLUTION TOPICAL ONCE
Status: CANCELLED | OUTPATIENT
Start: 2023-07-28 | End: 2023-07-28

## 2023-07-28 RX ADMIN — LIDOCAINE HYDROCHLORIDE 5 ML: 40 SOLUTION TOPICAL at 11:27

## 2023-07-28 ASSESSMENT — ENCOUNTER SYMPTOMS
DIARRHEA: 0
NAUSEA: 0
VOMITING: 0
SHORTNESS OF BREATH: 0
RHINORRHEA: 0
COUGH: 0

## 2023-07-28 NOTE — PROGRESS NOTES
[address] [place] [address]   Progress Note and Procedure Note      Ellyn Catalan  MEDICAL RECORD NUMBER:  [id number]  AGE: 69 y.o. GENDER: female  : 1955  EPISODE DATE:  2023    Subjective:     Chief Complaint   Patient presents with    Wound Check     abdomen         HISTORY of PRESENT ILLNESS HPI     Ellyn Catalan is a 69 y.o. female who presents today for wound/ulcer evaluation. History of Wound Context: here to follow up on abdominal wound. No signs or symptoms of infection.    Wound/Ulcer Pain Timing/Severity: none  Quality of pain: N/A  Severity:  0 / 10   Modifying Factors: None  Associated Signs/Symptoms: none    Ulcer Identification:  Ulcer Type: non-healing surgical  Contributing Factors: none         PAST MEDICAL HISTORY        Diagnosis Date    Arthritis     Bleb, lung (HCC)     causes  pneumothorax     Colon polyps 2017    COPD (chronic obstructive pulmonary disease) (HCC)     COVID-19     GERD (gastroesophageal reflux disease)     Heart palpitations     History of colon polyps     Hyperlipidemia     Hyperplastic colon polyp     Hypertension     Kidney infection 2004    Pneumothorax     spontaneous x 2, chest tube    PONV (postoperative nausea and vomiting)     Supraventricular tachycardia ([address])     first episode about 20 years ago, well controlled now with Atenolol       PAST SURGICAL HISTORY    Past Surgical History:   Procedure Laterality Date    ABDOMEN SURGERY Left 2023    Incision and drainage infected epidermal cyst left upper outer quadrant anterior abdominal wall performed by Lily Marrufo MD at [facility], LAPAROSCOPIC      COLONOSCOPY  2009    small hemorrhoids    COLONOSCOPY  2002    hemohhoids, hyperplastic polyp, right colon pathology-chronic inflammation in lumina propria with focal actue cryptitis consistent with active colitis    COLONOSCOPY  2016    flat sigmoid [id number] Clindamycin Pregnancy And Lactation Text: This medication can be used in pregnancy if certain situations. Clindamycin is also present in breast milk.

## 2023-08-05 NOTE — DISCHARGE INSTRUCTIONS
[facility]                            Visit  Discharge Instructions / Physician Orders  DATE:8/11/2023     Home Care: [facility]     SUPPLIES ORDERED THRU:    Home care to order supplies due to medicare guidelines                 DATE LAST SUPPLIED     Wound Location:  ABD     Cleanse with: Liquid antibacterial soap and water, rinse well -may shower     Dressing Orders: Collagen (Fibracol) to Harper Soup with saline, Silicone Border Bandage     Frequency:  Change daily     Additional Orders: Increase protein to diet (meat, cheese, eggs, fish, peanut butter, nuts and beans)  Multivitamin daily          OFFLOADING [] YES  TYPE:                  [] NA     Weekly wound care visits until determined otherwise. Antibiotic therapy-wound care related YES [x] NO [] NA[]     MY CHART []     Smart Device  []      HYPERBARIC TREATMENT-                TREATMENT #                          Your next appointment with the [address] [place] [address] is in 2 weeks                                                                                                   (Please note your next appointment above and if you are unable to keep, kindly give a 24 hour notice. Thank you.)  If more than 15 min late we cannot guarantee you will be seen due to clinician schedule  Per Policy, Excessive cancellation will call for dismissal from program.  If you experience any of the following, please call the [address] [place] [address] during business hours:  [phone]     * Increase in Pain  * Temperature over 101  * Increase in drainage from your wound  * Drainage with a foul odor  * Bleeding  * Increase in swelling  * Need for compression bandage changes due to slippage, breakthrough drainage. If you need medical attention outside of the business hours of the [address] [place] [address] please contact your PCP or go to the nearest emergency room.      The information contained in the After Visit Summary has been reviewed with

## 2023-08-11 ENCOUNTER — HOSPITAL ENCOUNTER (OUTPATIENT)
Dept: WOUND CARE | Age: 68
Discharge: HOME | End: 2023-08-11
Payer: MEDICARE

## 2023-08-11 VITALS
HEART RATE: 76 BPM | SYSTOLIC BLOOD PRESSURE: 149 MMHG | TEMPERATURE: 97.7 F | RESPIRATION RATE: 17 BRPM | HEIGHT: 66 IN | WEIGHT: 190 LBS | DIASTOLIC BLOOD PRESSURE: 85 MMHG | BODY MASS INDEX: 30.53 KG/M2

## 2023-08-11 DIAGNOSIS — S31.109D OPEN WOUND ANTERIOR ABDOMINAL WALL, SUBSEQUENT ENCOUNTER: Primary | ICD-10-CM

## 2023-08-11 PROCEDURE — 11042 DBRDMT SUBQ TIS 1ST 20SQCM/<: CPT

## 2023-08-11 RX ORDER — SODIUM CHLOR/HYPOCHLOROUS ACID 0.033 %
SOLUTION, IRRIGATION IRRIGATION ONCE
OUTPATIENT
Start: 2023-08-11 | End: 2023-08-11

## 2023-08-11 RX ORDER — LIDOCAINE HYDROCHLORIDE 20 MG/ML
JELLY TOPICAL ONCE
OUTPATIENT
Start: 2023-08-11 | End: 2023-08-11

## 2023-08-11 RX ORDER — LIDOCAINE HYDROCHLORIDE 40 MG/ML
SOLUTION TOPICAL ONCE
OUTPATIENT
Start: 2023-08-11 | End: 2023-08-11

## 2023-08-11 RX ORDER — LIDOCAINE HYDROCHLORIDE 40 MG/ML
SOLUTION TOPICAL ONCE
Status: COMPLETED | OUTPATIENT
Start: 2023-08-11 | End: 2023-08-11

## 2023-08-11 RX ADMIN — LIDOCAINE HYDROCHLORIDE 5 ML: 40 SOLUTION TOPICAL at 10:55

## 2023-08-11 ASSESSMENT — ENCOUNTER SYMPTOMS
COUGH: 0
SHORTNESS OF BREATH: 0
DIARRHEA: 0
RHINORRHEA: 0
VOMITING: 0
NAUSEA: 0

## 2023-08-11 NOTE — PROGRESS NOTES
debrided down through and including the removal of subcutaneous tissue. Devitalized Tissue Debrided:  biofilm and slough    Pre Debridement Measurements:  Are located in the [facility]  Documentation Flow Sheet    Wound/Ulcer #: 1    Post Debridement Measurements:  Wound/Ulcer Descriptions are Pre Debridement except measurements:    Wound 07/07/23 Abdomen Left;Mid #1 (Active)   Wound Image   08/11/23 1051   Wound Etiology Non-Healing Surgical 08/11/23 1051   Dressing Status New drainage noted; Old drainage noted 08/11/23 1051   Wound Cleansed Soap and water 08/11/23 1051   Wound Length (cm) 1.7 cm 08/11/23 1051   Wound Width (cm) 3 cm 08/11/23 1051   Wound Depth (cm) 0.1 cm 08/11/23 1051   Wound Surface Area (cm^2) 5.1 cm^2 08/11/23 1051   Change in Wound Size % (l*w) 65.31 08/11/23 1051   Wound Volume (cm^3) 0.51 cm^3 08/11/23 1051   Wound Healing % 91 08/11/23 1051   Post-Procedure Length (cm) 1.7 cm 08/11/23 1051   Post-Procedure Width (cm) 3 cm 08/11/23 1051   Post-Procedure Depth (cm) 0.1 cm 08/11/23 1051   Post-Procedure Surface Area (cm^2) 5.1 cm^2 08/11/23 1051   Post-Procedure Volume (cm^3) 0.51 cm^3 08/11/23 1051   Wound Assessment Pink/red;Granulation tissue 08/11/23 1051   Drainage Amount Moderate (25-50%) 08/11/23 1051   Drainage Description Serosanguinous 08/11/23 1051   Odor None 08/11/23 1051   Caitlin-wound Assessment Hyperpigmented;Erosion 08/11/23 1051   Margins Defined edges 08/11/23 1051   Wound Thickness Description not for Pressure Injury Full thickness 08/11/23 1051   Number of days: 35          Percent of Wound(s)/Ulcer(s) Debrided: 100%    Total Surface Area Debrided:  5.10 sq cm     Diabetic/Pressure/Non Pressure Ulcers only:  Ulcer: Non-Pressure ulcer, fat layer exposed    Estimated Blood Loss:  Minimal    Hemostasis Achieved:  by pressure    Procedural Pain:  0  / 10     Post Procedural Pain:  0 / 10     Response to treatment:  Well tolerated by patient.     Silver nitrate applied to

## 2023-08-11 NOTE — PLAN OF CARE
Problem: Discharge Planning  Goal: Discharge to home or other facility with appropriate resources  Outcome: Progressing     Problem: Wound:  Goal: Will show signs of wound healing; wound closure and no evidence of infection  Description: Will show signs of wound healing; wound closure and no evidence of infection  Outcome: Progressing     Problem: Falls - Risk of:  Goal: Will remain free from falls  Description: Will remain free from falls  Outcome: Progressing     Problem: Cognitive:  Goal: Knowledge of wound care  Description: Knowledge of wound care  Outcome: Progressing

## 2023-08-25 ENCOUNTER — HOSPITAL ENCOUNTER (OUTPATIENT)
Dept: WOUND CARE | Age: 68
Discharge: HOME | End: 2023-08-25
Payer: MEDICARE

## 2023-08-25 VITALS
WEIGHT: 190 LBS | HEIGHT: 66 IN | RESPIRATION RATE: 16 BRPM | HEART RATE: 57 BPM | SYSTOLIC BLOOD PRESSURE: 135 MMHG | DIASTOLIC BLOOD PRESSURE: 84 MMHG | TEMPERATURE: 97.3 F | BODY MASS INDEX: 30.53 KG/M2

## 2023-08-25 DIAGNOSIS — S31.109D OPEN WOUND ANTERIOR ABDOMINAL WALL, SUBSEQUENT ENCOUNTER: Primary | ICD-10-CM

## 2023-08-25 PROCEDURE — 17250 CHEM CAUT OF GRANLTJ TISSUE: CPT

## 2023-08-25 RX ORDER — LIDOCAINE HYDROCHLORIDE 40 MG/ML
SOLUTION TOPICAL ONCE
OUTPATIENT
Start: 2023-08-25 | End: 2023-08-25

## 2023-08-25 RX ORDER — SODIUM CHLOR/HYPOCHLOROUS ACID 0.033 %
SOLUTION, IRRIGATION IRRIGATION ONCE
OUTPATIENT
Start: 2023-08-25 | End: 2023-08-25

## 2023-08-25 RX ORDER — LIDOCAINE HYDROCHLORIDE 40 MG/ML
SOLUTION TOPICAL ONCE
Status: COMPLETED | OUTPATIENT
Start: 2023-08-25 | End: 2023-08-25

## 2023-08-25 RX ORDER — LIDOCAINE HYDROCHLORIDE 20 MG/ML
JELLY TOPICAL ONCE
OUTPATIENT
Start: 2023-08-25 | End: 2023-08-25

## 2023-08-25 RX ADMIN — LIDOCAINE HYDROCHLORIDE 10 ML: 40 SOLUTION TOPICAL at 10:27

## 2023-08-25 ASSESSMENT — ENCOUNTER SYMPTOMS
RHINORRHEA: 0
NAUSEA: 0
COUGH: 0
DIARRHEA: 0
SHORTNESS OF BREATH: 0
VOMITING: 0

## 2023-08-25 NOTE — PROGRESS NOTES
[address] [place] [address]   Progress Note and Procedure Note      Milka Coronado  MEDICAL RECORD NUMBER:  [id number]  AGE: 69 y.o. GENDER: female  : 1955  EPISODE DATE:  2023    Subjective:     Chief Complaint   Patient presents with    Wound Check     abdomen         HISTORY of PRESENT ILLNESS HPI     Milka Coronado is a 69 y.o. female who presents today for wound/ulcer evaluation. History of Wound Context: presents in follow up on abdominal wound that is almost healed.    Wound/Ulcer Pain Timing/Severity: none  Quality of pain: N/A  Severity:  0 / 10   Modifying Factors: None  Associated Signs/Symptoms: none    Ulcer Identification:  Ulcer Type: non-healing surgical  Contributing Factors: none         PAST MEDICAL HISTORY        Diagnosis Date    Arthritis     Bleb, lung (HCC)     causes  pneumothorax     Colon polyps 2017    COPD (chronic obstructive pulmonary disease) (HCC)     COVID-19     GERD (gastroesophageal reflux disease)     Heart palpitations     History of colon polyps     Hyperlipidemia     Hyperplastic colon polyp     Hypertension     Kidney infection 2004    Pneumothorax     spontaneous x 2, chest tube    PONV (postoperative nausea and vomiting)     Supraventricular tachycardia ([address])     first episode about 20 years ago, well controlled now with Atenolol       PAST SURGICAL HISTORY    Past Surgical History:   Procedure Laterality Date    ABDOMEN SURGERY Left 2023    Incision and drainage infected epidermal cyst left upper outer quadrant anterior abdominal wall performed by Roly Aldrich MD at [facility], LAPAROSCOP[facility]      COLONOSCOPY  2009    small hemorrhoids    COLONOSCOPY  2002    hemohhoids, hyperplastic polyp, right colon pathology-chronic inflammation in lumina propria with focal actue cryptitis consistent with active colitis    COLONOSCOPY  2016    flat sigmoid [id number]

## 2023-09-05 NOTE — DISCHARGE INSTRUCTIONS
[facility]                            Visit  Discharge Instructions / Physician Orders  DATE:9/7/2023     Home Care: [facility]     SUPPLIES ORDERED THRU:    Home care to order supplies due to medicare guidelines                 DATE LAST SUPPLIED     Wound Location:  ABD     Cleanse with: Liquid antibacterial soap and water, rinse well -may shower     Dressing Orders: Collagen (Fibracol) to Harper Soup with saline, Silicone Border Bandage     Frequency:  Change daily     Additional Orders: Increase protein to diet (meat, cheese, eggs, fish, peanut butter, nuts and beans)  Multivitamin daily          OFFLOADING [] YES  TYPE:                  [] NA     Weekly wound care visits until determined otherwise. Antibiotic therapy-wound care related YES [x] NO [] NA[]     MY CHART []     Smart Device  []      HYPERBARIC TREATMENT-                TREATMENT #                          Your next appointment with the [address] [place] [address] is in 2 weeks                                                                                                   (Please note your next appointment above and if you are unable to keep, kindly give a 24 hour notice. Thank you.)  If more than 15 min late we cannot guarantee you will be seen due to clinician schedule  Per Policy, Excessive cancellation will call for dismissal from program.  If you experience any of the following, please call the [address] [place] [address] during business hours:  [phone]     * Increase in Pain  * Temperature over 101  * Increase in drainage from your wound  * Drainage with a foul odor  * Bleeding  * Increase in swelling  * Need for compression bandage changes due to slippage, breakthrough drainage. If you need medical attention outside of the business hours of the [address] [place] [address] please contact your PCP or go to the nearest emergency room.      The information contained in the After Visit Summary has been reviewed with

## 2023-09-07 ENCOUNTER — HOSPITAL ENCOUNTER (OUTPATIENT)
Dept: WOUND CARE | Age: 68
Discharge: HOME | End: 2023-09-07

## 2023-11-20 ENCOUNTER — OFFICE VISIT (OUTPATIENT)
Dept: ORTHOPEDIC SURGERY | Age: 68
End: 2023-11-20
Payer: MEDICARE

## 2023-11-20 VITALS — WEIGHT: 192 LBS | BODY MASS INDEX: 30.86 KG/M2 | HEIGHT: 66 IN | RESPIRATION RATE: 14 BRPM

## 2023-11-20 DIAGNOSIS — M25.511 RIGHT SHOULDER PAIN, UNSPECIFIED CHRONICITY: Primary | ICD-10-CM

## 2023-11-20 PROCEDURE — 99204 OFFICE O/P NEW MOD 45 MIN: CPT | Performed by: ORTHOPAEDIC SURGERY

## 2023-11-20 PROCEDURE — 1090F PRES/ABSN URINE INCON ASSESS: CPT | Performed by: ORTHOPAEDIC SURGERY

## 2023-11-20 PROCEDURE — G8417 CALC BMI ABV UP PARAM F/U: HCPCS | Performed by: ORTHOPAEDIC SURGERY

## 2023-11-20 PROCEDURE — G8427 DOCREV CUR MEDS BY ELIG CLIN: HCPCS | Performed by: ORTHOPAEDIC SURGERY

## 2023-11-20 PROCEDURE — 1036F TOBACCO NON-USER: CPT | Performed by: ORTHOPAEDIC SURGERY

## 2023-11-20 PROCEDURE — 1124F ACP DISCUSS-NO DSCNMKR DOCD: CPT | Performed by: ORTHOPAEDIC SURGERY

## 2023-11-20 PROCEDURE — G8399 PT W/DXA RESULTS DOCUMENT: HCPCS | Performed by: ORTHOPAEDIC SURGERY

## 2023-11-20 PROCEDURE — 3017F COLORECTAL CA SCREEN DOC REV: CPT | Performed by: ORTHOPAEDIC SURGERY

## 2023-11-20 PROCEDURE — G8484 FLU IMMUNIZE NO ADMIN: HCPCS | Performed by: ORTHOPAEDIC SURGERY

## 2023-11-20 NOTE — PROGRESS NOTES
ORTHOPEDIC PATIENT EVALUATION      HPI / Chief Complaint  Puma Atkins is a 69 y.o. right-hand-dominant female who presents for evaluation of her right shoulder. In September 2022 she indicates that she sustained a fracture to her right proximal humerus. She was seen by Dr. Renella Soulier at [address] [facility]. She had surgical stabilization of her fracture with an external fixator although she does state that she also had some screws inserted as well. Ultimately she developed an infection and had 3 I&D procedures. She states that she was diagnosed with osteomyelitis and was on IV antibiotics following discharge from the hospital.  She completed her course of antibiotic treatment and states that she developed stiffness in her shoulder. Since the procedure Dr. Renella Soulier has left [address] [place] [address] and so she followed up with Dr. Juan Alberto Cano. He diagnosed her with an adhesive capsulitis and also felt that she likely has a persistent infection in her shoulder and so would not be a candidate for shoulder replacement. As a result she was sent back to her infectious disease physician Dr. Reid Hill. He has begun a lab work-up and ordered an MRI study of her shoulder. In the meantime her PCP has referred her to me for further evaluation and treatment. Describes having pain in her shoulder typically with movement. Her movement is limited as a result of this incident as well as stiffness. Her pain is primarily localized to the lateral aspect of her shoulder. She denies having any fevers, chills, sweats or any constitutional symptoms.     Past Medical History  Harriett Keyes  has a past medical history of Arthritis, Bleb, lung ([address]), Colon polyps, COPD (chronic obstructive pulmonary disease) ([address]), COVID-19, GERD (gastroesophageal reflux disease), Heart palpitations, History of colon polyps, Hyperlipidemia, Hyperplastic colon polyp, Hypertension, Kidney infection, Pneumothorax, PONV (postoperative nausea and vomiting), and

## 2024-01-11 DIAGNOSIS — M86.9 OSTEOMYELITIS OF RIGHT HUMERUS, UNSPECIFIED TYPE (HCC): Primary | ICD-10-CM

## 2024-01-11 RX ORDER — ALBUTEROL SULFATE 90 UG/1
4 AEROSOL, METERED RESPIRATORY (INHALATION) PRN
OUTPATIENT
Start: 2024-01-12

## 2024-01-11 RX ORDER — HEPARIN 100 UNIT/ML
500 SYRINGE INTRAVENOUS PRN
OUTPATIENT
Start: 2024-01-12

## 2024-01-11 RX ORDER — DIPHENHYDRAMINE HYDROCHLORIDE 50 MG/ML
50 INJECTION INTRAMUSCULAR; INTRAVENOUS
OUTPATIENT
Start: 2024-01-12

## 2024-01-11 RX ORDER — SODIUM CHLORIDE 9 MG/ML
INJECTION, SOLUTION INTRAVENOUS CONTINUOUS
OUTPATIENT
Start: 2024-01-12

## 2024-01-11 RX ORDER — EPINEPHRINE 1 MG/ML
0.3 INJECTION, SOLUTION, CONCENTRATE INTRAVENOUS PRN
OUTPATIENT
Start: 2024-01-12

## 2024-01-11 RX ORDER — ONDANSETRON 2 MG/ML
8 INJECTION INTRAMUSCULAR; INTRAVENOUS
OUTPATIENT
Start: 2024-01-12

## 2024-01-11 RX ORDER — SODIUM CHLORIDE 0.9 % (FLUSH) 0.9 %
5-40 SYRINGE (ML) INJECTION PRN
OUTPATIENT
Start: 2024-01-12

## 2024-01-11 RX ORDER — SODIUM CHLORIDE 9 MG/ML
5-250 INJECTION, SOLUTION INTRAVENOUS PRN
OUTPATIENT
Start: 2024-01-12

## 2024-01-11 RX ORDER — ACETAMINOPHEN 325 MG/1
650 TABLET ORAL
OUTPATIENT
Start: 2024-01-12

## 2024-01-17 ENCOUNTER — HOSPITAL ENCOUNTER (OUTPATIENT)
Dept: INFUSION THERAPY | Age: 69
Setting detail: INFUSION SERIES
Discharge: HOME | End: 2024-01-17
Payer: MEDICARE

## 2024-01-17 VITALS
SYSTOLIC BLOOD PRESSURE: 128 MMHG | HEART RATE: 74 BPM | OXYGEN SATURATION: 99 % | DIASTOLIC BLOOD PRESSURE: 84 MMHG | RESPIRATION RATE: 17 BRPM | TEMPERATURE: 96.8 F

## 2024-01-17 DIAGNOSIS — M86.9 OSTEOMYELITIS OF RIGHT HUMERUS, UNSPECIFIED TYPE (HCC): Primary | ICD-10-CM

## 2024-01-17 PROCEDURE — 96365 THER/PROPH/DIAG IV INF INIT: CPT

## 2024-01-17 PROCEDURE — 2580000003 HC RX 258: Performed by: STUDENT IN AN ORGANIZED HEALTH CARE EDUCATION/TRAINING PROGRAM

## 2024-01-17 PROCEDURE — 6360000002 HC RX W HCPCS: Performed by: STUDENT IN AN ORGANIZED HEALTH CARE EDUCATION/TRAINING PROGRAM

## 2024-01-17 RX ORDER — ONDANSETRON 2 MG/ML
8 INJECTION INTRAMUSCULAR; INTRAVENOUS
OUTPATIENT
Start: 2024-01-22

## 2024-01-17 RX ORDER — SODIUM CHLORIDE 9 MG/ML
5-250 INJECTION, SOLUTION INTRAVENOUS PRN
OUTPATIENT
Start: 2024-01-22

## 2024-01-17 RX ORDER — SODIUM CHLORIDE 0.9 % (FLUSH) 0.9 %
5-40 SYRINGE (ML) INJECTION PRN
OUTPATIENT
Start: 2024-01-22

## 2024-01-17 RX ORDER — DIPHENHYDRAMINE HYDROCHLORIDE 50 MG/ML
50 INJECTION INTRAMUSCULAR; INTRAVENOUS
OUTPATIENT
Start: 2024-01-22

## 2024-01-17 RX ORDER — EPINEPHRINE 1 MG/ML
0.3 INJECTION, SOLUTION, CONCENTRATE INTRAVENOUS PRN
OUTPATIENT
Start: 2024-01-22

## 2024-01-17 RX ORDER — ALBUTEROL SULFATE 90 UG/1
4 AEROSOL, METERED RESPIRATORY (INHALATION) PRN
OUTPATIENT
Start: 2024-01-22

## 2024-01-17 RX ORDER — HEPARIN 100 UNIT/ML
500 SYRINGE INTRAVENOUS PRN
OUTPATIENT
Start: 2024-01-22

## 2024-01-17 RX ORDER — SODIUM CHLORIDE 9 MG/ML
INJECTION, SOLUTION INTRAVENOUS CONTINUOUS
OUTPATIENT
Start: 2024-01-22

## 2024-01-17 RX ORDER — ACETAMINOPHEN 325 MG/1
650 TABLET ORAL
OUTPATIENT
Start: 2024-01-22

## 2024-01-17 RX ORDER — LEVETIRACETAM 500 MG/1
TABLET ORAL
COMMUNITY
Start: 2022-01-31

## 2024-01-17 RX ORDER — LORAZEPAM 0.5 MG/1
TABLET ORAL
COMMUNITY
Start: 2022-05-27

## 2024-01-17 RX ADMIN — DALBAVANCIN 1500 MG: 500 INJECTION, POWDER, FOR SOLUTION INTRAVENOUS at 13:32

## 2024-01-17 NOTE — PROGRESS NOTES
Pt arrived for [facility] infusion.  Vitals obtained and PIV started in L wrist.  Infusion started at 13:32 and ended at 14:18.  Pt tolerated well.  No s/s adverse reaction noted.  PIV removed.  Pt discharged home, ambulatory per self.

## 2024-01-18 ENCOUNTER — CLINICAL DOCUMENTATION (OUTPATIENT)
Facility: HOSPITAL | Age: 69
End: 2024-01-18

## 2024-01-18 NOTE — PROGRESS NOTES
Patient Assistance    Met with: [facility] reviewed                Additional notes: No assistance available.

## 2024-01-24 ENCOUNTER — HOSPITAL ENCOUNTER (OUTPATIENT)
Dept: INFUSION THERAPY | Age: 69
Setting detail: INFUSION SERIES
Discharge: HOME | End: 2024-01-24
Payer: MEDICARE

## 2024-01-24 VITALS
DIASTOLIC BLOOD PRESSURE: 77 MMHG | SYSTOLIC BLOOD PRESSURE: 117 MMHG | RESPIRATION RATE: 16 BRPM | HEART RATE: 64 BPM | OXYGEN SATURATION: 95 % | TEMPERATURE: 97.4 F

## 2024-01-24 DIAGNOSIS — M86.9 OSTEOMYELITIS OF RIGHT HUMERUS, UNSPECIFIED TYPE (HCC): Primary | ICD-10-CM

## 2024-01-24 PROCEDURE — 2580000003 HC RX 258: Performed by: STUDENT IN AN ORGANIZED HEALTH CARE EDUCATION/TRAINING PROGRAM

## 2024-01-24 PROCEDURE — 96365 THER/PROPH/DIAG IV INF INIT: CPT

## 2024-01-24 PROCEDURE — 6360000002 HC RX W HCPCS: Performed by: STUDENT IN AN ORGANIZED HEALTH CARE EDUCATION/TRAINING PROGRAM

## 2024-01-24 RX ORDER — SODIUM CHLORIDE 9 MG/ML
5-250 INJECTION, SOLUTION INTRAVENOUS PRN
OUTPATIENT
Start: 2024-01-31

## 2024-01-24 RX ORDER — ONDANSETRON 2 MG/ML
8 INJECTION INTRAMUSCULAR; INTRAVENOUS
OUTPATIENT
Start: 2024-01-31

## 2024-01-24 RX ORDER — SODIUM CHLORIDE 0.9 % (FLUSH) 0.9 %
5-40 SYRINGE (ML) INJECTION PRN
OUTPATIENT
Start: 2024-01-31

## 2024-01-24 RX ORDER — HEPARIN 100 UNIT/ML
500 SYRINGE INTRAVENOUS PRN
OUTPATIENT
Start: 2024-01-31

## 2024-01-24 RX ORDER — EPINEPHRINE 1 MG/ML
0.3 INJECTION, SOLUTION, CONCENTRATE INTRAVENOUS PRN
OUTPATIENT
Start: 2024-01-31

## 2024-01-24 RX ORDER — SODIUM CHLORIDE 9 MG/ML
INJECTION, SOLUTION INTRAVENOUS CONTINUOUS
OUTPATIENT
Start: 2024-01-31

## 2024-01-24 RX ORDER — ALBUTEROL SULFATE 90 UG/1
4 AEROSOL, METERED RESPIRATORY (INHALATION) PRN
OUTPATIENT
Start: 2024-01-31

## 2024-01-24 RX ORDER — ACETAMINOPHEN 325 MG/1
650 TABLET ORAL
OUTPATIENT
Start: 2024-01-31

## 2024-01-24 RX ORDER — DIPHENHYDRAMINE HYDROCHLORIDE 50 MG/ML
50 INJECTION INTRAMUSCULAR; INTRAVENOUS
OUTPATIENT
Start: 2024-01-31

## 2024-01-24 RX ADMIN — DALBAVANCIN 1500 MG: 500 INJECTION, POWDER, FOR SOLUTION INTRAVENOUS at 13:49

## 2024-01-24 NOTE — PROGRESS NOTES
Pt arrived for Dalvance infusion.  Vitals obtained and PIV started in L hand.  Infusion started at 13:49 and ended at 14:36.  Pt tolerated well.  No s/s adverse reaction noted.  PIV removed.  Pt discharged home, ambulatory per self.

## 2024-03-06 PROBLEM — E11.9 TYPE 2 DIABETES MELLITUS WITHOUT COMPLICATION, WITHOUT LONG-TERM CURRENT USE OF INSULIN (HCC): Status: ACTIVE | Noted: 2024-03-06

## 2024-03-06 PROBLEM — M25.511 ACUTE PAIN OF RIGHT SHOULDER: Status: ACTIVE | Noted: 2023-09-26

## 2024-03-07 ENCOUNTER — HOSPITAL ENCOUNTER (OUTPATIENT)
Age: 69
Discharge: HOME | End: 2024-03-07
Payer: MEDICARE

## 2024-03-07 PROCEDURE — 36415 COLL VENOUS BLD VENIPUNCTURE: CPT

## 2024-03-20 ENCOUNTER — HOSPITAL ENCOUNTER (OUTPATIENT)
Age: 69
Setting detail: SPECIMEN
Discharge: HOME | End: 2024-03-20
Payer: MEDICARE

## 2024-03-20 DIAGNOSIS — R82.998 FOAMY URINE: ICD-10-CM

## 2024-03-20 LAB
BILIRUB UR QL STRIP: NEGATIVE
CLARITY UR: CLEAR
COLOR UR: YELLOW
COMMENT: NORMAL
GLUCOSE UR STRIP-MCNC: NEGATIVE MG/DL
HGB UR QL STRIP.AUTO: NEGATIVE
KETONES UR STRIP-MCNC: NEGATIVE MG/DL
LEUKOCYTE ESTERASE UR QL STRIP: NEGATIVE
NITRITE UR QL STRIP: NEGATIVE
PH UR STRIP: 5 [PH] (ref 5–8)
PROT UR STRIP-MCNC: NEGATIVE MG/DL
SP GR UR STRIP: 1.01 (ref 1–1.03)
UROBILINOGEN UR STRIP-ACNC: NORMAL EU/DL (ref 0–1)

## 2024-03-20 PROCEDURE — 81003 URINALYSIS AUTO W/O SCOPE: CPT

## 2025-01-06 PROBLEM — L40.50 PSORIATIC ARTHRITIS (HCC): Status: ACTIVE | Noted: 2025-01-06

## 2025-02-10 ENCOUNTER — HOSPITAL ENCOUNTER (OUTPATIENT)
Dept: WOMENS IMAGING | Age: 70
Discharge: HOME | End: 2025-02-12
Attending: FAMILY MEDICINE
Payer: MEDICARE

## 2025-02-10 DIAGNOSIS — Z12.31 SCREENING MAMMOGRAM, ENCOUNTER FOR: ICD-10-CM

## 2025-02-10 PROCEDURE — 77063 BREAST TOMOSYNTHESIS BI: CPT

## 2025-03-31 ENCOUNTER — HOSPITAL ENCOUNTER (OUTPATIENT)
Dept: GENERAL RADIOLOGY | Age: 70
Discharge: HOME | End: 2025-04-02
Payer: MEDICARE

## 2025-03-31 ENCOUNTER — HOSPITAL ENCOUNTER (OUTPATIENT)
Age: 70
Discharge: HOME | End: 2025-04-02
Payer: MEDICARE

## 2025-03-31 DIAGNOSIS — M54.9 CHRONIC BACK PAIN, UNSPECIFIED BACK LOCATION, UNSPECIFIED BACK PAIN LATERALITY: ICD-10-CM

## 2025-03-31 DIAGNOSIS — G89.29 CHRONIC BACK PAIN, UNSPECIFIED BACK LOCATION, UNSPECIFIED BACK PAIN LATERALITY: ICD-10-CM

## 2025-03-31 PROCEDURE — 72100 X-RAY EXAM L-S SPINE 2/3 VWS: CPT

## (undated) DEVICE — NEEDLE SCLERO 25GA L240CM OD0.51MM ID0.24MM EXTN L4MM SHTH

## (undated) DEVICE — SUTURE STRATAFIX SYMMETRIC PDS + SZ 1 L18IN ABSRB VLT L48MM SXPP1A400

## (undated) DEVICE — SNARE ENDOSCP L240CM LOOP W13MM DIA2.4MM SHT THROW SM OVL

## (undated) DEVICE — AIRLIFE™ NASAL OXYGEN CANNULA CURVED, FLARED TIP, WITH 7 FEET (2.1 M) CRUSH RESISTANT TUBING, OVER-THE-EAR STYLE: Brand: AIRLIFE™

## (undated) DEVICE — MASTISOL ADHESIVE LIQ 2/3ML

## (undated) DEVICE — ZIMMER® STERILE DISPOSABLE TOURNIQUET CUFF WITH PLC, DUAL PORT, SINGLE BLADDER, 30 IN. (76 CM)

## (undated) DEVICE — POLYP TRAP: Brand: TRAPEASE®

## (undated) DEVICE — SUTURE VCRL SZ 0 L36IN ABSRB UD L36MM CT-1 1/2 CIR J946H

## (undated) DEVICE — MERCY HEALTH ST CHARLES: Brand: MEDLINE INDUSTRIES, INC.

## (undated) DEVICE — HIGH FLOW TIP

## (undated) DEVICE — GLOVE ORTHO 7 1/2   MSG9475

## (undated) DEVICE — DRESSING,GAUZE,XEROFORM,CURAD,1"X8",ST: Brand: CURAD

## (undated) DEVICE — DRESSING PETRO W3XL8IN OIL EMUL N ADH GZ KNIT IMPREG CELOS

## (undated) DEVICE — BLANKET WRM W29.9XL79.1IN UP BODY FORC AIR MISTRAL-AIR

## (undated) DEVICE — SOLUTION IV IRRIG LACTATED RINGERS 3000ML 2B7487

## (undated) DEVICE — DRESSING ALGINATE POST OPERATIVE 12X3.5 IN RECT PRIMASEAL

## (undated) DEVICE — GLOVE ORANGE PI 7   MSG9070

## (undated) DEVICE — ENDO KIT W/SYRINGE: Brand: MEDLINE INDUSTRIES, INC.

## (undated) DEVICE — INTENDED TO SUPPORT AND MAINTAIN THE POSITION OF AN ANESTHETIZED PATIENT DURING SURGERY: Brand: HERMANTOR XL PINK KNEE POSITIONING PAD

## (undated) DEVICE — KIT AUTOTRNS APPL AERO 2 SET SYR 2 TIP FOR PLT SEP SYS GPS

## (undated) DEVICE — GLOVE ORANGE PI 8 1/2   MSG9085

## (undated) DEVICE — BITEBLOCK 54FR W/ DENT RIM BLOX

## (undated) DEVICE — CEMENT MIXING SYSTEM WITH FEMORAL BREAKWAY NOZZLE: Brand: REVOLUTION

## (undated) DEVICE — DEFENDO AIR WATER SUCTION AND BIOPSY VALVE KIT FOR  OLYMPUS: Brand: DEFENDO AIR/WATER/SUCTION AND BIOPSY VALVE

## (undated) DEVICE — Device: Brand: SPOT ENDOSOPIC MARKER

## (undated) DEVICE — SUTURE VCRL SZ 0 L36IN ABSRB UD CT-1 L36MM 1/2 CIR TAPR PNT VCP946H

## (undated) DEVICE — PADDING CAST W6INXL4YD POLY POR SPUN DACRON SYN VERSATILE

## (undated) DEVICE — SUTURE STRATAFIX SPRL MCRYL + SZ 2 0 L27IN ABSRB UD W NDL SXMP1B419

## (undated) DEVICE — 2108 SERIES SAGITTAL BLADE FLARED, GROUND  (29.0 X 1.32 X 84.0MM)

## (undated) DEVICE — ST CHARLES MINOR ABDOMINAL PK: Brand: MEDLINE INDUSTRIES, INC.

## (undated) DEVICE — GLOVE ORTHO 8   MSG9480

## (undated) DEVICE — SINGLE-USE BIOPSY FORCEPS: Brand: RADIAL JAW 4

## (undated) DEVICE — BANDAGE COBAN 4 IN COMPR W4INXL5YD FOAM COHESIVE QUIK STK SELF ADH SFT

## (undated) DEVICE — SINGLE PORT MANIFOLD: Brand: NEPTUNE 2

## (undated) DEVICE — GOWN,AURORA,NONREINFORCED,LARGE: Brand: MEDLINE

## (undated) DEVICE — SOLUTION IRRIG 3000ML 0.9% SOD CHL USP UROMATIC PLAS CONT

## (undated) DEVICE — DUAL CUT SAGITTAL BLADE

## (undated) DEVICE — FORCEPS BX L240CM WRK CHN 2.8MM STD CAP W/ NDL MIC MESH

## (undated) DEVICE — COOLER THER 20-31IN L CRYO COMB W/ PD KNEE TB GRAV FLO

## (undated) DEVICE — SINGLE-USE POLYPECTOMY SNARE: Brand: CAPTIVATOR II

## (undated) DEVICE — KIT SEP W/ BLD DRAW TB SYR NDL TRNQT PD

## (undated) DEVICE — Device

## (undated) DEVICE — [TOMCAT CUTTER, ARTHROSCOPIC SHAVER BLADE,  DO NOT RESTERILIZE,  DO NOT USE IF PACKAGE IS DAMAGED,  KEEP DRY,  KEEP AWAY FROM SUNLIGHT]: Brand: FORMULA

## (undated) DEVICE — 450 ML BOTTLE OF 0.05% CHLORHEXIDINE GLUCONATE IN 99.95% STERILE WATER FOR IRRIGATION, USP AND APPLICATOR.: Brand: IRRISEPT ANTIMICROBIAL WOUND LAVAGE

## (undated) DEVICE — JELLY,LUBE,STERILE,FLIP TOP,TUBE,2-OZ: Brand: MEDLINE

## (undated) DEVICE — 4-PORT MANIFOLD: Brand: NEPTUNE 2

## (undated) DEVICE — CLOSURE SKIN FLX NONINVASIVE PRELOC TECHNOLOGY FOR 24IN

## (undated) DEVICE — SUTURE ETHLN SZ 3-0 L18IN NONABSORBABLE BLK FS-1 L24MM 3/8 663H

## (undated) DEVICE — SHEET, T, LAPAROTOMY, STERILE: Brand: MEDLINE

## (undated) DEVICE — PACK ARTHRO W PCH